# Patient Record
Sex: FEMALE | Race: WHITE | Employment: OTHER
[De-identification: names, ages, dates, MRNs, and addresses within clinical notes are randomized per-mention and may not be internally consistent; named-entity substitution may affect disease eponyms.]

---

## 2017-01-13 DIAGNOSIS — F41.9 ANXIETY: ICD-10-CM

## 2017-01-13 RX ORDER — LORAZEPAM 0.5 MG/1
TABLET ORAL
Qty: 90 TABLET | Refills: 0 | Status: SHIPPED | OUTPATIENT
Start: 2017-01-13 | End: 2017-02-15 | Stop reason: SDUPTHER

## 2017-02-13 DIAGNOSIS — K22.70 BARRETT'S ESOPHAGUS WITHOUT DYSPLASIA: ICD-10-CM

## 2017-02-13 RX ORDER — OMEPRAZOLE 40 MG/1
CAPSULE, DELAYED RELEASE ORAL
Qty: 90 CAPSULE | Refills: 1 | Status: SHIPPED | OUTPATIENT
Start: 2017-02-13 | End: 2017-12-04 | Stop reason: SDUPTHER

## 2017-02-15 ENCOUNTER — OFFICE VISIT (OUTPATIENT)
Facility: CLINIC | Age: 77
End: 2017-02-15

## 2017-02-15 VITALS
WEIGHT: 220.4 LBS | OXYGEN SATURATION: 92 % | BODY MASS INDEX: 35.57 KG/M2 | HEART RATE: 67 BPM | DIASTOLIC BLOOD PRESSURE: 74 MMHG | SYSTOLIC BLOOD PRESSURE: 122 MMHG | TEMPERATURE: 97.7 F

## 2017-02-15 DIAGNOSIS — I10 ESSENTIAL HYPERTENSION: ICD-10-CM

## 2017-02-15 DIAGNOSIS — F41.9 ANXIETY: ICD-10-CM

## 2017-02-15 DIAGNOSIS — J20.9 BRONCHOSPASM WITH BRONCHITIS, ACUTE: ICD-10-CM

## 2017-02-15 DIAGNOSIS — R06.2 WHEEZING: ICD-10-CM

## 2017-02-15 DIAGNOSIS — I48.0 PAROXYSMAL ATRIAL FIBRILLATION (HCC): ICD-10-CM

## 2017-02-15 DIAGNOSIS — J20.9 ACUTE BRONCHITIS, UNSPECIFIED ORGANISM: Primary | ICD-10-CM

## 2017-02-15 PROCEDURE — G8427 DOCREV CUR MEDS BY ELIG CLIN: HCPCS | Performed by: FAMILY MEDICINE

## 2017-02-15 PROCEDURE — 4040F PNEUMOC VAC/ADMIN/RCVD: CPT | Performed by: FAMILY MEDICINE

## 2017-02-15 PROCEDURE — G8417 CALC BMI ABV UP PARAM F/U: HCPCS | Performed by: FAMILY MEDICINE

## 2017-02-15 PROCEDURE — 1036F TOBACCO NON-USER: CPT | Performed by: FAMILY MEDICINE

## 2017-02-15 PROCEDURE — G8484 FLU IMMUNIZE NO ADMIN: HCPCS | Performed by: FAMILY MEDICINE

## 2017-02-15 PROCEDURE — G8399 PT W/DXA RESULTS DOCUMENT: HCPCS | Performed by: FAMILY MEDICINE

## 2017-02-15 PROCEDURE — 99214 OFFICE O/P EST MOD 30 MIN: CPT | Performed by: FAMILY MEDICINE

## 2017-02-15 PROCEDURE — 1090F PRES/ABSN URINE INCON ASSESS: CPT | Performed by: FAMILY MEDICINE

## 2017-02-15 PROCEDURE — 1123F ACP DISCUSS/DSCN MKR DOCD: CPT | Performed by: FAMILY MEDICINE

## 2017-02-15 RX ORDER — AZITHROMYCIN 250 MG/1
250 TABLET, FILM COATED ORAL DAILY
Qty: 10 TABLET | Refills: 0 | Status: SHIPPED | OUTPATIENT
Start: 2017-02-15 | End: 2017-02-25

## 2017-02-15 RX ORDER — ALBUTEROL SULFATE 90 UG/1
2 AEROSOL, METERED RESPIRATORY (INHALATION) EVERY 6 HOURS PRN
Qty: 1 INHALER | Refills: 3 | Status: SHIPPED | OUTPATIENT
Start: 2017-02-15 | End: 2018-02-28 | Stop reason: ALTCHOICE

## 2017-02-15 RX ORDER — LORAZEPAM 0.5 MG/1
TABLET ORAL
Qty: 90 TABLET | Refills: 0 | Status: SHIPPED | OUTPATIENT
Start: 2017-02-15 | End: 2017-03-28 | Stop reason: SDUPTHER

## 2017-03-02 ENCOUNTER — TELEPHONE (OUTPATIENT)
Facility: CLINIC | Age: 77
End: 2017-03-02

## 2017-03-17 ENCOUNTER — OFFICE VISIT (OUTPATIENT)
Dept: PODIATRY | Age: 77
End: 2017-03-17
Payer: MEDICARE

## 2017-03-17 VITALS
HEIGHT: 66 IN | BODY MASS INDEX: 33.59 KG/M2 | WEIGHT: 209 LBS | DIASTOLIC BLOOD PRESSURE: 50 MMHG | SYSTOLIC BLOOD PRESSURE: 90 MMHG

## 2017-03-17 DIAGNOSIS — S93.601A SPRAIN OF FOOT, RIGHT, INITIAL ENCOUNTER: Primary | ICD-10-CM

## 2017-03-17 DIAGNOSIS — M79.671 RIGHT FOOT PAIN: ICD-10-CM

## 2017-03-17 DIAGNOSIS — R60.0 EDEMA OF RIGHT FOOT: ICD-10-CM

## 2017-03-17 PROCEDURE — 1090F PRES/ABSN URINE INCON ASSESS: CPT | Performed by: PODIATRIST

## 2017-03-17 PROCEDURE — G8399 PT W/DXA RESULTS DOCUMENT: HCPCS | Performed by: PODIATRIST

## 2017-03-17 PROCEDURE — G8417 CALC BMI ABV UP PARAM F/U: HCPCS | Performed by: PODIATRIST

## 2017-03-17 PROCEDURE — 4040F PNEUMOC VAC/ADMIN/RCVD: CPT | Performed by: PODIATRIST

## 2017-03-17 PROCEDURE — 99203 OFFICE O/P NEW LOW 30 MIN: CPT | Performed by: PODIATRIST

## 2017-03-17 PROCEDURE — 73630 X-RAY EXAM OF FOOT: CPT | Performed by: PODIATRIST

## 2017-03-17 PROCEDURE — G8427 DOCREV CUR MEDS BY ELIG CLIN: HCPCS | Performed by: PODIATRIST

## 2017-03-17 PROCEDURE — 1036F TOBACCO NON-USER: CPT | Performed by: PODIATRIST

## 2017-03-17 PROCEDURE — G8484 FLU IMMUNIZE NO ADMIN: HCPCS | Performed by: PODIATRIST

## 2017-03-17 PROCEDURE — 1123F ACP DISCUSS/DSCN MKR DOCD: CPT | Performed by: PODIATRIST

## 2017-03-17 ASSESSMENT — ENCOUNTER SYMPTOMS
COLOR CHANGE: 0
SHORTNESS OF BREATH: 0
DIARRHEA: 0
NAUSEA: 0
BACK PAIN: 0

## 2017-03-28 DIAGNOSIS — F41.9 ANXIETY: ICD-10-CM

## 2017-03-28 RX ORDER — LORAZEPAM 0.5 MG/1
TABLET ORAL
Qty: 90 TABLET | Refills: 0 | Status: SHIPPED | OUTPATIENT
Start: 2017-03-28 | End: 2017-05-10 | Stop reason: SDUPTHER

## 2017-05-10 DIAGNOSIS — F41.9 ANXIETY: ICD-10-CM

## 2017-05-10 RX ORDER — LORAZEPAM 0.5 MG/1
TABLET ORAL
Qty: 90 TABLET | Refills: 0 | Status: SHIPPED | OUTPATIENT
Start: 2017-05-10 | End: 2017-06-26 | Stop reason: SDUPTHER

## 2017-06-22 ENCOUNTER — HOSPITAL ENCOUNTER (OUTPATIENT)
Dept: PULMONOLOGY | Age: 77
Setting detail: THERAPIES SERIES
Discharge: HOME | End: 2017-06-22
Payer: MEDICARE

## 2017-06-22 VITALS — HEIGHT: 66 IN | WEIGHT: 215 LBS | BODY MASS INDEX: 34.55 KG/M2

## 2017-06-22 PROCEDURE — G0238 OTH RESP PROC, INDIV: HCPCS

## 2017-06-22 PROCEDURE — G0239 OTH RESP PROC, GROUP: HCPCS

## 2017-06-22 RX ORDER — NITROGLYCERIN 0.4 MG/1
0.4 TABLET SUBLINGUAL EVERY 5 MIN PRN
Status: ON HOLD | COMMUNITY
End: 2019-01-30 | Stop reason: HOSPADM

## 2017-06-26 DIAGNOSIS — F41.9 ANXIETY: ICD-10-CM

## 2017-06-26 RX ORDER — LORAZEPAM 0.5 MG/1
TABLET ORAL
Qty: 90 TABLET | Refills: 0 | Status: SHIPPED | OUTPATIENT
Start: 2017-06-26 | End: 2017-08-07 | Stop reason: SDUPTHER

## 2017-07-17 ENCOUNTER — HOSPITAL ENCOUNTER (OUTPATIENT)
Dept: PULMONOLOGY | Age: 77
Setting detail: THERAPIES SERIES
Discharge: HOME | End: 2017-07-17
Payer: MEDICARE

## 2017-07-17 VITALS — WEIGHT: 216.4 LBS | BODY MASS INDEX: 34.93 KG/M2

## 2017-07-17 PROCEDURE — G0237 THERAPEUTIC PROCD STRG ENDUR: HCPCS

## 2017-07-24 ENCOUNTER — HOSPITAL ENCOUNTER (OUTPATIENT)
Dept: PULMONOLOGY | Age: 77
Setting detail: THERAPIES SERIES
Discharge: HOME | End: 2017-07-24
Payer: MEDICARE

## 2017-07-24 VITALS — WEIGHT: 218.2 LBS | BODY MASS INDEX: 35.22 KG/M2

## 2017-07-24 PROCEDURE — G0239 OTH RESP PROC, GROUP: HCPCS

## 2017-07-31 ENCOUNTER — HOSPITAL ENCOUNTER (OUTPATIENT)
Dept: PULMONOLOGY | Age: 77
Setting detail: THERAPIES SERIES
Discharge: HOME | End: 2017-07-31
Payer: MEDICARE

## 2017-07-31 VITALS — WEIGHT: 219 LBS | BODY MASS INDEX: 35.35 KG/M2

## 2017-07-31 PROCEDURE — G0239 OTH RESP PROC, GROUP: HCPCS

## 2017-08-02 ENCOUNTER — HOSPITAL ENCOUNTER (OUTPATIENT)
Dept: PULMONOLOGY | Age: 77
Setting detail: THERAPIES SERIES
Discharge: HOME | End: 2017-08-02
Payer: MEDICARE

## 2017-08-02 ENCOUNTER — HOSPITAL ENCOUNTER (OUTPATIENT)
Age: 77
Discharge: HOME | End: 2017-08-02
Payer: MEDICARE

## 2017-08-02 VITALS — BODY MASS INDEX: 34.54 KG/M2 | WEIGHT: 214 LBS

## 2017-08-02 LAB
ALT SERPL-CCNC: 48 U/L (ref 5–33)
ANION GAP SERPL CALCULATED.3IONS-SCNC: 15 MMOL/L (ref 9–17)
AST SERPL-CCNC: 37 U/L
BUN BLDV-MCNC: 22 MG/DL (ref 8–23)
BUN/CREAT BLD: ABNORMAL (ref 9–20)
CALCIUM SERPL-MCNC: 9.1 MG/DL (ref 8.6–10.4)
CHLORIDE BLD-SCNC: 106 MMOL/L (ref 98–107)
CHOLESTEROL/HDL RATIO: 3.2
CHOLESTEROL: 158 MG/DL
CO2: 22 MMOL/L (ref 20–31)
CREAT SERPL-MCNC: 0.82 MG/DL (ref 0.5–0.9)
GFR AFRICAN AMERICAN: >60 ML/MIN
GFR NON-AFRICAN AMERICAN: >60 ML/MIN
GFR SERPL CREATININE-BSD FRML MDRD: ABNORMAL ML/MIN/{1.73_M2}
GFR SERPL CREATININE-BSD FRML MDRD: ABNORMAL ML/MIN/{1.73_M2}
GLUCOSE BLD-MCNC: 140 MG/DL (ref 70–99)
HDLC SERPL-MCNC: 49 MG/DL
LDL CHOLESTEROL: 91 MG/DL (ref 0–130)
POTASSIUM SERPL-SCNC: 4.4 MMOL/L (ref 3.7–5.3)
SODIUM BLD-SCNC: 143 MMOL/L (ref 135–144)
TRIGL SERPL-MCNC: 92 MG/DL
VLDLC SERPL CALC-MCNC: NORMAL MG/DL (ref 1–30)

## 2017-08-02 PROCEDURE — 80061 LIPID PANEL: CPT

## 2017-08-02 PROCEDURE — 80048 BASIC METABOLIC PNL TOTAL CA: CPT

## 2017-08-02 PROCEDURE — 84450 TRANSFERASE (AST) (SGOT): CPT

## 2017-08-02 PROCEDURE — 84460 ALANINE AMINO (ALT) (SGPT): CPT

## 2017-08-02 PROCEDURE — 36415 COLL VENOUS BLD VENIPUNCTURE: CPT

## 2017-08-02 PROCEDURE — G0239 OTH RESP PROC, GROUP: HCPCS

## 2017-08-02 RX ORDER — LOSARTAN POTASSIUM 25 MG/1
25 TABLET ORAL DAILY
COMMUNITY
End: 2017-12-20

## 2017-08-07 PROBLEM — J84.10 PULMONARY FIBROSIS (HCC): Status: ACTIVE | Noted: 2017-08-07

## 2017-08-09 ENCOUNTER — HOSPITAL ENCOUNTER (OUTPATIENT)
Dept: PULMONOLOGY | Age: 77
Setting detail: THERAPIES SERIES
Discharge: HOME | End: 2017-08-09
Payer: MEDICARE

## 2017-08-09 PROCEDURE — G0239 OTH RESP PROC, GROUP: HCPCS

## 2017-08-14 ENCOUNTER — HOSPITAL ENCOUNTER (OUTPATIENT)
Dept: PULMONOLOGY | Age: 77
Setting detail: THERAPIES SERIES
Discharge: HOME | End: 2017-08-14
Payer: MEDICARE

## 2017-08-14 VITALS — BODY MASS INDEX: 35.16 KG/M2 | WEIGHT: 218.8 LBS

## 2017-08-14 PROCEDURE — G0239 OTH RESP PROC, GROUP: HCPCS

## 2017-08-21 ENCOUNTER — HOSPITAL ENCOUNTER (OUTPATIENT)
Dept: PULMONOLOGY | Age: 77
Setting detail: THERAPIES SERIES
Discharge: HOME | End: 2017-08-21
Payer: MEDICARE

## 2017-08-21 VITALS — WEIGHT: 218.4 LBS | BODY MASS INDEX: 35.1 KG/M2

## 2017-08-21 PROCEDURE — G0239 OTH RESP PROC, GROUP: HCPCS

## 2017-08-23 ENCOUNTER — HOSPITAL ENCOUNTER (OUTPATIENT)
Dept: PULMONOLOGY | Age: 77
Setting detail: THERAPIES SERIES
Discharge: HOME | End: 2017-08-23
Payer: MEDICARE

## 2017-08-23 PROCEDURE — G0239 OTH RESP PROC, GROUP: HCPCS

## 2017-08-28 ENCOUNTER — HOSPITAL ENCOUNTER (OUTPATIENT)
Dept: PULMONOLOGY | Age: 77
Setting detail: THERAPIES SERIES
Discharge: HOME | End: 2017-08-28
Payer: MEDICARE

## 2017-08-28 VITALS — WEIGHT: 210 LBS | BODY MASS INDEX: 33.75 KG/M2

## 2017-08-28 PROCEDURE — G0239 OTH RESP PROC, GROUP: HCPCS

## 2017-09-06 ENCOUNTER — HOSPITAL ENCOUNTER (OUTPATIENT)
Dept: PULMONOLOGY | Age: 77
Setting detail: THERAPIES SERIES
Discharge: HOME | End: 2017-09-06
Payer: MEDICARE

## 2017-09-06 VITALS — BODY MASS INDEX: 34.59 KG/M2 | WEIGHT: 215.2 LBS

## 2017-09-06 PROCEDURE — G0239 OTH RESP PROC, GROUP: HCPCS

## 2017-09-11 ENCOUNTER — HOSPITAL ENCOUNTER (OUTPATIENT)
Dept: PULMONOLOGY | Age: 77
Setting detail: THERAPIES SERIES
Discharge: HOME | End: 2017-09-11
Payer: MEDICARE

## 2017-09-11 VITALS — BODY MASS INDEX: 34.59 KG/M2 | WEIGHT: 215.2 LBS

## 2017-09-11 PROCEDURE — G0239 OTH RESP PROC, GROUP: HCPCS

## 2017-09-11 PROCEDURE — G0424 PULMONARY REHAB W EXER: HCPCS

## 2017-09-12 ENCOUNTER — HOSPITAL ENCOUNTER (OUTPATIENT)
Age: 77
Discharge: HOME | End: 2017-09-12
Payer: MEDICARE

## 2017-09-12 LAB
ABSOLUTE EOS #: 0.23 K/UL (ref 0–0.4)
ABSOLUTE LYMPH #: 1.04 K/UL (ref 1–4.8)
ABSOLUTE MONO #: 0.93 K/UL (ref 0.1–1.3)
ALBUMIN SERPL-MCNC: 4 G/DL (ref 3.5–5.2)
ALBUMIN/GLOBULIN RATIO: ABNORMAL (ref 1–2.5)
ALP BLD-CCNC: 74 U/L (ref 35–104)
ALT SERPL-CCNC: 25 U/L (ref 5–33)
AST SERPL-CCNC: 23 U/L
BASOPHILS # BLD: 0 %
BASOPHILS ABSOLUTE: 0 K/UL (ref 0–0.2)
BILIRUB SERPL-MCNC: 0.2 MG/DL (ref 0.3–1.2)
BILIRUBIN DIRECT: 0.08 MG/DL
BILIRUBIN, INDIRECT: 0.12 MG/DL (ref 0–1)
DIFFERENTIAL TYPE: ABNORMAL
EOSINOPHILS RELATIVE PERCENT: 2 %
GLOBULIN: ABNORMAL G/DL (ref 1.5–3.8)
HCT VFR BLD CALC: 33 % (ref 36–46)
HEMOGLOBIN: 9.8 G/DL (ref 12–16)
LYMPHOCYTES # BLD: 9 %
MCH RBC QN AUTO: 21.2 PG (ref 26–34)
MCHC RBC AUTO-ENTMCNC: 29.8 G/DL (ref 31–37)
MCV RBC AUTO: 71.2 FL (ref 80–100)
MONOCYTES # BLD: 8 %
MORPHOLOGY: ABNORMAL
PDW BLD-RTO: 20.4 % (ref 11.5–14.9)
PLATELET # BLD: 390 K/UL (ref 150–450)
PLATELET ESTIMATE: ABNORMAL
PMV BLD AUTO: 7.9 FL (ref 6–12)
RBC # BLD: 4.64 M/UL (ref 4–5.2)
RBC # BLD: ABNORMAL 10*6/UL
SEG NEUTROPHILS: 81 %
SEGMENTED NEUTROPHILS ABSOLUTE COUNT: 9.4 K/UL (ref 1.3–9.1)
TOTAL PROTEIN: 7.1 G/DL (ref 6.4–8.3)
WBC # BLD: 11.6 K/UL (ref 3.5–11)
WBC # BLD: ABNORMAL 10*3/UL

## 2017-09-12 PROCEDURE — 36415 COLL VENOUS BLD VENIPUNCTURE: CPT

## 2017-09-12 PROCEDURE — 85025 COMPLETE CBC W/AUTO DIFF WBC: CPT

## 2017-09-12 PROCEDURE — 80076 HEPATIC FUNCTION PANEL: CPT

## 2017-09-13 ENCOUNTER — HOSPITAL ENCOUNTER (OUTPATIENT)
Dept: PULMONOLOGY | Age: 77
Setting detail: THERAPIES SERIES
Discharge: HOME | End: 2017-09-13
Payer: MEDICARE

## 2017-09-13 VITALS — BODY MASS INDEX: 34.39 KG/M2 | WEIGHT: 214 LBS

## 2017-09-13 PROCEDURE — G0239 OTH RESP PROC, GROUP: HCPCS

## 2017-09-15 DIAGNOSIS — F41.9 ANXIETY: ICD-10-CM

## 2017-09-18 ENCOUNTER — HOSPITAL ENCOUNTER (OUTPATIENT)
Dept: PULMONOLOGY | Age: 77
Setting detail: THERAPIES SERIES
Discharge: HOME | End: 2017-09-18
Payer: MEDICARE

## 2017-09-18 VITALS — WEIGHT: 214.2 LBS | BODY MASS INDEX: 34.42 KG/M2

## 2017-09-18 PROCEDURE — G0239 OTH RESP PROC, GROUP: HCPCS

## 2017-09-18 RX ORDER — LORAZEPAM 0.5 MG/1
TABLET ORAL
Qty: 90 TABLET | Refills: 0 | Status: SHIPPED | OUTPATIENT
Start: 2017-09-18 | End: 2017-11-22 | Stop reason: SDUPTHER

## 2017-10-04 ENCOUNTER — HOSPITAL ENCOUNTER (OUTPATIENT)
Dept: PULMONOLOGY | Age: 77
Setting detail: THERAPIES SERIES
Discharge: HOME | End: 2017-10-04
Payer: MEDICARE

## 2017-10-04 VITALS — WEIGHT: 214.4 LBS | BODY MASS INDEX: 34.46 KG/M2

## 2017-10-04 PROCEDURE — G0239 OTH RESP PROC, GROUP: HCPCS

## 2017-10-04 NOTE — PROGRESS NOTES
Pt completed the  Pulmonary rehab program today. Provided pt with discharge handouts. Covered HEP & patients ability to participate in the Phase III program post graduation. Erum plans on attending the Phase III program 1 XQwk.

## 2017-10-09 ENCOUNTER — APPOINTMENT (OUTPATIENT)
Dept: PULMONOLOGY | Age: 77
End: 2017-10-09
Payer: MEDICARE

## 2017-10-11 ENCOUNTER — APPOINTMENT (OUTPATIENT)
Dept: PULMONOLOGY | Age: 77
End: 2017-10-11
Payer: MEDICARE

## 2017-10-16 ENCOUNTER — APPOINTMENT (OUTPATIENT)
Dept: PULMONOLOGY | Age: 77
End: 2017-10-16
Payer: MEDICARE

## 2017-10-18 ENCOUNTER — APPOINTMENT (OUTPATIENT)
Dept: PULMONOLOGY | Age: 77
End: 2017-10-18
Payer: MEDICARE

## 2017-10-23 ENCOUNTER — APPOINTMENT (OUTPATIENT)
Dept: PULMONOLOGY | Age: 77
End: 2017-10-23
Payer: MEDICARE

## 2017-10-25 ENCOUNTER — APPOINTMENT (OUTPATIENT)
Dept: PULMONOLOGY | Age: 77
End: 2017-10-25
Payer: MEDICARE

## 2017-10-30 ENCOUNTER — APPOINTMENT (OUTPATIENT)
Dept: PULMONOLOGY | Age: 77
End: 2017-10-30
Payer: MEDICARE

## 2017-11-22 DIAGNOSIS — F41.9 ANXIETY: ICD-10-CM

## 2017-11-22 RX ORDER — LORAZEPAM 0.5 MG/1
TABLET ORAL
Qty: 90 TABLET | Refills: 0 | Status: SHIPPED | OUTPATIENT
Start: 2017-11-22 | End: 2018-01-08 | Stop reason: SDUPTHER

## 2017-12-04 ENCOUNTER — HOSPITAL ENCOUNTER (OUTPATIENT)
Dept: WOMENS IMAGING | Age: 77
Discharge: HOME | End: 2017-12-04
Payer: MEDICARE

## 2017-12-04 DIAGNOSIS — K22.70 BARRETT'S ESOPHAGUS WITHOUT DYSPLASIA: ICD-10-CM

## 2017-12-04 DIAGNOSIS — Z12.31 SCREENING MAMMOGRAM, ENCOUNTER FOR: ICD-10-CM

## 2017-12-04 PROCEDURE — 77063 BREAST TOMOSYNTHESIS BI: CPT

## 2017-12-04 RX ORDER — OMEPRAZOLE 40 MG/1
CAPSULE, DELAYED RELEASE ORAL
Qty: 90 CAPSULE | Refills: 1 | Status: SHIPPED | OUTPATIENT
Start: 2017-12-04 | End: 2018-04-28 | Stop reason: SDUPTHER

## 2017-12-19 ENCOUNTER — HOSPITAL ENCOUNTER (OUTPATIENT)
Age: 77
Discharge: HOME | End: 2017-12-19
Payer: MEDICARE

## 2017-12-19 LAB
ANION GAP SERPL CALCULATED.3IONS-SCNC: 13 MMOL/L (ref 9–17)
BUN BLDV-MCNC: 22 MG/DL (ref 8–23)
CHLORIDE BLD-SCNC: 103 MMOL/L (ref 98–107)
CO2: 23 MMOL/L (ref 20–31)
CREAT SERPL-MCNC: 0.84 MG/DL (ref 0.5–0.9)
GFR AFRICAN AMERICAN: >60 ML/MIN
GFR NON-AFRICAN AMERICAN: >60 ML/MIN
GFR SERPL CREATININE-BSD FRML MDRD: NORMAL ML/MIN/{1.73_M2}
GFR SERPL CREATININE-BSD FRML MDRD: NORMAL ML/MIN/{1.73_M2}
POTASSIUM SERPL-SCNC: 4.4 MMOL/L (ref 3.7–5.3)
SODIUM BLD-SCNC: 139 MMOL/L (ref 135–144)

## 2017-12-19 PROCEDURE — 84520 ASSAY OF UREA NITROGEN: CPT

## 2017-12-19 PROCEDURE — 82565 ASSAY OF CREATININE: CPT

## 2017-12-19 PROCEDURE — 80051 ELECTROLYTE PANEL: CPT

## 2017-12-19 PROCEDURE — 36415 COLL VENOUS BLD VENIPUNCTURE: CPT

## 2017-12-20 ENCOUNTER — HOSPITAL ENCOUNTER (INPATIENT)
Age: 77
LOS: 3 days | Discharge: HOME | DRG: 193 | End: 2017-12-23
Attending: EMERGENCY MEDICINE | Admitting: FAMILY MEDICINE
Payer: MEDICARE

## 2017-12-20 ENCOUNTER — APPOINTMENT (OUTPATIENT)
Dept: GENERAL RADIOLOGY | Age: 77
DRG: 193 | End: 2017-12-20
Payer: MEDICARE

## 2017-12-20 DIAGNOSIS — J18.9 PNEUMONIA DUE TO ORGANISM: Primary | ICD-10-CM

## 2017-12-20 DIAGNOSIS — N39.0 URINARY TRACT INFECTION WITHOUT HEMATURIA, SITE UNSPECIFIED: ICD-10-CM

## 2017-12-20 DIAGNOSIS — R79.89 ELEVATED BRAIN NATRIURETIC PEPTIDE (BNP) LEVEL: ICD-10-CM

## 2017-12-20 PROBLEM — E78.2 MIXED HYPERLIPIDEMIA: Status: ACTIVE | Noted: 2017-12-20

## 2017-12-20 LAB
-: ABNORMAL
ABSOLUTE EOS #: 0.1 K/UL (ref 0–0.4)
ABSOLUTE IMMATURE GRANULOCYTE: ABNORMAL K/UL (ref 0–0.3)
ABSOLUTE LYMPH #: 0.86 K/UL (ref 1–4.8)
ABSOLUTE MONO #: 0.58 K/UL (ref 0.1–1.3)
AMORPHOUS: ABNORMAL
ANION GAP SERPL CALCULATED.3IONS-SCNC: 13 MMOL/L (ref 9–17)
BACTERIA: ABNORMAL
BASOPHILS # BLD: 0 % (ref 0–2)
BASOPHILS ABSOLUTE: 0 K/UL (ref 0–0.2)
BILIRUBIN URINE: NEGATIVE
BNP INTERPRETATION: ABNORMAL
BUN BLDV-MCNC: 22 MG/DL (ref 8–23)
BUN/CREAT BLD: NORMAL (ref 9–20)
CALCIUM SERPL-MCNC: 9 MG/DL (ref 8.6–10.4)
CASTS UA: ABNORMAL /LPF
CHLORIDE BLD-SCNC: 104 MMOL/L (ref 98–107)
CO2: 21 MMOL/L (ref 20–31)
COLOR: YELLOW
COMMENT UA: ABNORMAL
CREAT SERPL-MCNC: 0.84 MG/DL (ref 0.5–0.9)
CRYSTALS, UA: ABNORMAL /HPF
DIFFERENTIAL TYPE: ABNORMAL
EKG ATRIAL RATE: 56 BPM
EKG P AXIS: 74 DEGREES
EKG P-R INTERVAL: 254 MS
EKG Q-T INTERVAL: 436 MS
EKG QRS DURATION: 94 MS
EKG QTC CALCULATION (BAZETT): 420 MS
EKG R AXIS: 32 DEGREES
EKG T AXIS: -26 DEGREES
EKG VENTRICULAR RATE: 56 BPM
EOSINOPHILS RELATIVE PERCENT: 1 % (ref 0–4)
EPITHELIAL CELLS UA: ABNORMAL /HPF
GFR AFRICAN AMERICAN: >60 ML/MIN
GFR NON-AFRICAN AMERICAN: >60 ML/MIN
GFR SERPL CREATININE-BSD FRML MDRD: NORMAL ML/MIN/{1.73_M2}
GFR SERPL CREATININE-BSD FRML MDRD: NORMAL ML/MIN/{1.73_M2}
GLUCOSE BLD-MCNC: 99 MG/DL (ref 70–99)
GLUCOSE URINE: NEGATIVE
HCT VFR BLD CALC: 30.2 % (ref 36–46)
HEMOGLOBIN: 9 G/DL (ref 12–16)
IMMATURE GRANULOCYTES: ABNORMAL %
KETONES, URINE: NEGATIVE
LEUKOCYTE ESTERASE, URINE: NEGATIVE
LYMPHOCYTES # BLD: 9 % (ref 24–44)
MCH RBC QN AUTO: 19.6 PG (ref 26–34)
MCHC RBC AUTO-ENTMCNC: 29.8 G/DL (ref 31–37)
MCV RBC AUTO: 65.8 FL (ref 80–100)
MONOCYTES # BLD: 6 % (ref 1–7)
MORPHOLOGY: ABNORMAL
MUCUS: ABNORMAL
NITRITE, URINE: POSITIVE
OTHER OBSERVATIONS UA: ABNORMAL
PDW BLD-RTO: 20.9 % (ref 11.5–14.9)
PH UA: 5.5 (ref 5–8)
PLATELET # BLD: 351 K/UL (ref 150–450)
PLATELET ESTIMATE: ABNORMAL
PMV BLD AUTO: 8.7 FL (ref 6–12)
POTASSIUM SERPL-SCNC: 4.4 MMOL/L (ref 3.7–5.3)
PRO-BNP: 2935 PG/ML
PROTEIN UA: ABNORMAL
RBC # BLD: 4.59 M/UL (ref 4–5.2)
RBC # BLD: ABNORMAL 10*6/UL
RBC UA: ABNORMAL /HPF
RENAL EPITHELIAL, UA: ABNORMAL /HPF
SEG NEUTROPHILS: 84 % (ref 36–66)
SEGMENTED NEUTROPHILS ABSOLUTE COUNT: 8.06 K/UL (ref 1.3–9.1)
SODIUM BLD-SCNC: 138 MMOL/L (ref 135–144)
SPECIFIC GRAVITY UA: 1.02 (ref 1–1.03)
TRICHOMONAS: ABNORMAL
TROPONIN INTERP: NORMAL
TROPONIN INTERP: NORMAL
TROPONIN T: <0.03 NG/ML
TROPONIN T: <0.03 NG/ML
TURBIDITY: CLEAR
URINE HGB: NEGATIVE
UROBILINOGEN, URINE: NORMAL
WBC # BLD: 9.6 K/UL (ref 3.5–11)
WBC # BLD: ABNORMAL 10*3/UL
WBC UA: ABNORMAL /HPF
YEAST: ABNORMAL

## 2017-12-20 PROCEDURE — 6370000000 HC RX 637 (ALT 250 FOR IP): Performed by: FAMILY MEDICINE

## 2017-12-20 PROCEDURE — 93005 ELECTROCARDIOGRAM TRACING: CPT

## 2017-12-20 PROCEDURE — 71020 XR CHEST STANDARD TWO VW: CPT

## 2017-12-20 PROCEDURE — 2580000003 HC RX 258: Performed by: EMERGENCY MEDICINE

## 2017-12-20 PROCEDURE — 6370000000 HC RX 637 (ALT 250 FOR IP): Performed by: EMERGENCY MEDICINE

## 2017-12-20 PROCEDURE — 85025 COMPLETE CBC W/AUTO DIFF WBC: CPT

## 2017-12-20 PROCEDURE — 6360000002 HC RX W HCPCS: Performed by: EMERGENCY MEDICINE

## 2017-12-20 PROCEDURE — 84484 ASSAY OF TROPONIN QUANT: CPT

## 2017-12-20 PROCEDURE — 2060000000 HC ICU INTERMEDIATE R&B

## 2017-12-20 PROCEDURE — 83880 ASSAY OF NATRIURETIC PEPTIDE: CPT

## 2017-12-20 PROCEDURE — 81001 URINALYSIS AUTO W/SCOPE: CPT

## 2017-12-20 PROCEDURE — 99285 EMERGENCY DEPT VISIT HI MDM: CPT

## 2017-12-20 PROCEDURE — 96374 THER/PROPH/DIAG INJ IV PUSH: CPT

## 2017-12-20 PROCEDURE — 36415 COLL VENOUS BLD VENIPUNCTURE: CPT

## 2017-12-20 PROCEDURE — 80048 BASIC METABOLIC PNL TOTAL CA: CPT

## 2017-12-20 RX ORDER — FUROSEMIDE 20 MG/1
40 TABLET ORAL DAILY
Status: ON HOLD | COMMUNITY
End: 2019-01-30 | Stop reason: HOSPADM

## 2017-12-20 RX ORDER — PANTOPRAZOLE SODIUM 40 MG/1
40 TABLET, DELAYED RELEASE ORAL
Status: DISCONTINUED | OUTPATIENT
Start: 2017-12-21 | End: 2017-12-23 | Stop reason: HOSPADM

## 2017-12-20 RX ORDER — M-VIT,TX,IRON,MINS/CALC/FOLIC 27MG-0.4MG
1 TABLET ORAL DAILY
Status: DISCONTINUED | OUTPATIENT
Start: 2017-12-21 | End: 2017-12-23 | Stop reason: HOSPADM

## 2017-12-20 RX ORDER — NITROGLYCERIN 0.4 MG/1
0.4 TABLET SUBLINGUAL EVERY 5 MIN PRN
Status: DISCONTINUED | OUTPATIENT
Start: 2017-12-20 | End: 2017-12-22 | Stop reason: SDUPTHER

## 2017-12-20 RX ORDER — ACETAMINOPHEN 325 MG/1
650 TABLET ORAL EVERY 4 HOURS PRN
Status: DISCONTINUED | OUTPATIENT
Start: 2017-12-20 | End: 2017-12-23 | Stop reason: HOSPADM

## 2017-12-20 RX ORDER — SODIUM CHLORIDE 0.9 % (FLUSH) 0.9 %
10 SYRINGE (ML) INJECTION EVERY 12 HOURS SCHEDULED
Status: DISCONTINUED | OUTPATIENT
Start: 2017-12-20 | End: 2017-12-23 | Stop reason: HOSPADM

## 2017-12-20 RX ORDER — METOPROLOL TARTRATE 50 MG/1
50 TABLET, FILM COATED ORAL ONCE
Status: COMPLETED | OUTPATIENT
Start: 2017-12-20 | End: 2017-12-20

## 2017-12-20 RX ORDER — CITALOPRAM 20 MG/1
20 TABLET ORAL DAILY
Status: DISCONTINUED | OUTPATIENT
Start: 2017-12-21 | End: 2017-12-23 | Stop reason: HOSPADM

## 2017-12-20 RX ORDER — ATORVASTATIN CALCIUM 40 MG/1
40 TABLET, FILM COATED ORAL DAILY
Status: DISCONTINUED | OUTPATIENT
Start: 2017-12-21 | End: 2017-12-20

## 2017-12-20 RX ORDER — LORAZEPAM 0.5 MG/1
0.5 TABLET ORAL 3 TIMES DAILY PRN
Status: DISCONTINUED | OUTPATIENT
Start: 2017-12-20 | End: 2017-12-23 | Stop reason: HOSPADM

## 2017-12-20 RX ORDER — SODIUM CHLORIDE 0.9 % (FLUSH) 0.9 %
10 SYRINGE (ML) INJECTION PRN
Status: DISCONTINUED | OUTPATIENT
Start: 2017-12-20 | End: 2017-12-23 | Stop reason: HOSPADM

## 2017-12-20 RX ORDER — METOPROLOL SUCCINATE 100 MG/1
100 TABLET, EXTENDED RELEASE ORAL DAILY
Status: DISCONTINUED | OUTPATIENT
Start: 2017-12-21 | End: 2017-12-23 | Stop reason: HOSPADM

## 2017-12-20 RX ORDER — ASPIRIN 81 MG/1
81 TABLET ORAL DAILY
Status: DISCONTINUED | OUTPATIENT
Start: 2017-12-21 | End: 2017-12-20

## 2017-12-20 RX ORDER — ATORVASTATIN CALCIUM 40 MG/1
40 TABLET, FILM COATED ORAL DAILY
Status: DISCONTINUED | OUTPATIENT
Start: 2017-12-21 | End: 2017-12-23 | Stop reason: HOSPADM

## 2017-12-20 RX ORDER — FUROSEMIDE 20 MG/1
10 TABLET ORAL DAILY PRN
Status: DISCONTINUED | OUTPATIENT
Start: 2017-12-20 | End: 2017-12-23 | Stop reason: HOSPADM

## 2017-12-20 RX ORDER — HYDRALAZINE HYDROCHLORIDE 20 MG/ML
10 INJECTION INTRAMUSCULAR; INTRAVENOUS EVERY 4 HOURS PRN
Status: DISCONTINUED | OUTPATIENT
Start: 2017-12-20 | End: 2017-12-23 | Stop reason: HOSPADM

## 2017-12-20 RX ORDER — ASPIRIN 81 MG/1
81 TABLET ORAL DAILY
Status: DISCONTINUED | OUTPATIENT
Start: 2017-12-21 | End: 2017-12-23 | Stop reason: HOSPADM

## 2017-12-20 RX ORDER — NITROGLYCERIN 0.4 MG/1
0.4 TABLET SUBLINGUAL EVERY 5 MIN PRN
Status: DISCONTINUED | OUTPATIENT
Start: 2017-12-20 | End: 2017-12-23 | Stop reason: HOSPADM

## 2017-12-20 RX ADMIN — AZITHROMYCIN MONOHYDRATE 500 MG: 500 INJECTION, POWDER, LYOPHILIZED, FOR SOLUTION INTRAVENOUS at 19:22

## 2017-12-20 RX ADMIN — METOPROLOL TARTRATE 50 MG: 50 TABLET ORAL at 19:52

## 2017-12-20 RX ADMIN — APIXABAN 5 MG: 5 TABLET, FILM COATED ORAL at 23:33

## 2017-12-20 RX ADMIN — CEFTRIAXONE SODIUM 1 G: 1 INJECTION, POWDER, FOR SOLUTION INTRAMUSCULAR; INTRAVENOUS at 19:15

## 2017-12-20 RX ADMIN — LORAZEPAM 0.5 MG: 0.5 TABLET ORAL at 23:33

## 2017-12-20 ASSESSMENT — ENCOUNTER SYMPTOMS
VOMITING: 0
EYE PAIN: 0
NAUSEA: 0
RHINORRHEA: 0
COUGH: 0
ABDOMINAL PAIN: 0
COLOR CHANGE: 0
SORE THROAT: 0
SHORTNESS OF BREATH: 1

## 2017-12-20 NOTE — ED PROVIDER NOTES
Cardioversion. Social History     Social History    Marital status:      Spouse name: N/A    Number of children: N/A    Years of education: N/A     Occupational History    Not on file. Social History Main Topics    Smoking status: Former Smoker     Years: 25.00    Smokeless tobacco: Not on file    Alcohol use Yes      Comment: occ    Drug use: No    Sexual activity: No     Other Topics Concern    Not on file     Social History Narrative    No narrative on file       Family History   Problem Relation Age of Onset    Heart Disease Father      congestive heart failure    Liver Disease Mother      liver failure    Heart Disease Brother      MI all 4 brothers   [place] Cancer Sister      colon, lung    Other Sister      lupus, kidney failure    High Blood Pressure Sister     High Cholesterol Sister     Stroke Sister     Heart Disease Sister      MI for both sisters    Cancer Sister      lung cancer both sisters    Stroke Maternal Grandmother        Allergies:  Penicillins    Home Medications:  Prior to Admission medications    Medication Sig Start Date End Date Taking? Authorizing Provider   omeprazole (PRILOSEC) 40 MG delayed release capsule TAKE 1 CAPSULE EVERY DAY 12/4/17  Yes Ana Maria Mota MD   LORazepam (ATIVAN) 0.5 MG tablet TAKE ONE TABLET BY MOUTH THREE TIMES DAILY AS NEEDED FOR ANXIETY 11/22/17  Yes Ana Maria Mota MD   citalopram (CELEXA) 20 MG tablet Take 1 tablet by mouth daily 8/7/17  Yes Ana Maria Mota MD   nitroGLYCERIN (NITROSTAT) 0.4 MG SL tablet Place 0.4 mg under the tongue every 5 minutes as needed for Chest pain up to max of 3 total doses. If no relief after 1 dose, call 911.    Yes Historical Provider, MD   aspirin 81 MG tablet Take 81 mg by mouth daily   Yes Historical Provider, MD   albuterol sulfate HFA (VENTOLIN HFA) 108 (90 BASE) MCG/ACT inhaler Inhale 2 puffs into the lungs every 6 hours as needed for Wheezing 2/15/17  Yes Ana Maria Mota MD   apixaban (ELIQUIS) 5 MG TABS tablet Take by mouth 2 times daily   Yes Historical Provider, MD   Multiple Vitamins-Minerals (THERAPEUTIC MULTIVITAMIN-MINERALS) tablet Take 1 tablet by mouth daily   Yes Historical Provider, MD   Coenzyme Q10 (CO Q-10) 200 MG CAPS Take by mouth Daily   Yes Historical Provider, MD   metoprolol (TOPROL-XL) 100 MG XL tablet Take 100 mg by mouth daily   Yes Historical Provider, MD   nitroGLYCERIN (NITROLINGUAL) 0.4 MG/SPRAY spray Place 1 spray under the tongue every 5 minutes as needed. Yes Historical Provider, MD   atorvastatin (LIPITOR) 40 MG tablet Take 40 mg by mouth daily. Yes Historical Provider, MD       REVIEW OF SYSTEMS    (2-9 systems for level 4, 10 or more for level 5)      Review of Systems   Constitutional: Negative for chills and fever. HENT: Negative for rhinorrhea and sore throat. Eyes: Negative for pain and visual disturbance. Respiratory: Positive for shortness of breath. Negative for cough. Cardiovascular: Negative for chest pain and palpitations. Gastrointestinal: Negative for abdominal pain, nausea and vomiting. Genitourinary: Negative for difficulty urinating and dysuria. Musculoskeletal: Negative for arthralgias and myalgias. Skin: Negative for color change and wound. Neurological: Positive for light-headedness. Negative for weakness, numbness and headaches. Psychiatric/Behavioral: Negative for behavioral problems and dysphoric mood. PHYSICAL EXAM   (up to 7 for level 4, 8 or more for level 5)      INITIAL VITALS:   BP (!) 205/74   Pulse 67   Temp 98.2 °F (36.8 °C) (Oral)   Resp 19   Ht 5' 6\" (1.676 m)   Wt 200 lb (90.7 kg)   LMP 11/01/1985   SpO2 91%   BMI 32.28 kg/m²     Physical Exam   Constitutional: She is oriented to person, place, and time. She appears well-developed and well-nourished. No distress. HENT:   Head: Normocephalic and atraumatic.    Mouth/Throat: Oropharynx is clear and moist.   Eyes: EOM are normal. Pupils are

## 2017-12-20 NOTE — ED PROVIDER NOTES
[address] ED  eMERGENCY dEPARTMENT eNCOUnter   Attending Attestation     Pt Name: Esa Osborn  MRN: [id number]  [place] 1940  Date of evaluation: 12/20/17       Esa Osborn is a 76 y.o. female who presents with Shortness of Breath and Hypotension      MDM:     This is a 70-year-old female that presents with complaints of dizziness and feeling like she is going to pass out. Patient states this been ongoing for a few weeks, it was worse today and she was post to see her family physician but she was feeling ill and could not make it to her doctor's appointment. The patient states this is associated mainly with getting out of the shower, it is not usually when going from a seated to a standing position. The patient denies any chest pain or palpitations, she has no nausea or vomiting. She complains of a mild cough but no fevers or chills. On examination the patient's awake and alert, oriented ×3, her heart is bradycardic with regular rhythm. Patient's chest x-ray shows possible pneumonia, we will start antibody therapy, I did recommend to the patient that she should be admitted to the hospital for further evaluation of her near-syncope. I discussed the risks and benefits including death. Patient declined admission because [facility] is coming up and she does not wish to stay in the hospital.  I had a long discussion with her about returning to the hospital.  Patient and her family member understood. Vitals:   Vitals:    12/20/17 1515 12/20/17 1545 12/20/17 1600 12/20/17 1615   BP: (!) 158/97 (!) 156/73 (!) 146/65    Pulse:  66 55 55   Resp:   18 23   Temp:       TempSrc:       SpO2:  96% 94% 94%   Weight:       Height:             I personally evaluated and examined the patient in conjunction with the resident and agree with the assessment, treatment plan, and disposition of the patient as recorded by the resident.     I performed a history and physical examination of the patient and discussed

## 2017-12-21 PROBLEM — N30.00 ACUTE CYSTITIS WITHOUT HEMATURIA: Status: ACTIVE | Noted: 2017-12-21

## 2017-12-21 LAB
ALBUMIN SERPL-MCNC: 3.4 G/DL (ref 3.5–5.2)
ALBUMIN/GLOBULIN RATIO: ABNORMAL (ref 1–2.5)
ALP BLD-CCNC: 65 U/L (ref 35–104)
ALT SERPL-CCNC: 25 U/L (ref 5–33)
ANION GAP SERPL CALCULATED.3IONS-SCNC: 13 MMOL/L (ref 9–17)
AST SERPL-CCNC: 26 U/L
BILIRUB SERPL-MCNC: 0.28 MG/DL (ref 0.3–1.2)
BUN BLDV-MCNC: 18 MG/DL (ref 8–23)
BUN/CREAT BLD: ABNORMAL (ref 9–20)
CALCIUM SERPL-MCNC: 8.8 MG/DL (ref 8.6–10.4)
CHLORIDE BLD-SCNC: 104 MMOL/L (ref 98–107)
CO2: 22 MMOL/L (ref 20–31)
CREAT SERPL-MCNC: 0.7 MG/DL (ref 0.5–0.9)
GFR AFRICAN AMERICAN: >60 ML/MIN
GFR NON-AFRICAN AMERICAN: >60 ML/MIN
GFR SERPL CREATININE-BSD FRML MDRD: ABNORMAL ML/MIN/{1.73_M2}
GFR SERPL CREATININE-BSD FRML MDRD: ABNORMAL ML/MIN/{1.73_M2}
GLUCOSE BLD-MCNC: 85 MG/DL (ref 70–99)
HCT VFR BLD CALC: 28.8 % (ref 36–46)
HEMOGLOBIN: 8.6 G/DL (ref 12–16)
MCH RBC QN AUTO: 19.6 PG (ref 26–34)
MCHC RBC AUTO-ENTMCNC: 29.8 G/DL (ref 31–37)
MCV RBC AUTO: 65.8 FL (ref 80–100)
PDW BLD-RTO: 20.9 % (ref 11.5–14.9)
PLATELET # BLD: 336 K/UL (ref 150–450)
PMV BLD AUTO: 9.1 FL (ref 6–12)
POTASSIUM SERPL-SCNC: 4.2 MMOL/L (ref 3.7–5.3)
RBC # BLD: 4.37 M/UL (ref 4–5.2)
SODIUM BLD-SCNC: 139 MMOL/L (ref 135–144)
TOTAL PROTEIN: 6 G/DL (ref 6.4–8.3)
WBC # BLD: 8.8 K/UL (ref 3.5–11)

## 2017-12-21 PROCEDURE — 6360000002 HC RX W HCPCS: Performed by: FAMILY MEDICINE

## 2017-12-21 PROCEDURE — 6370000000 HC RX 637 (ALT 250 FOR IP): Performed by: FAMILY MEDICINE

## 2017-12-21 PROCEDURE — 2580000003 HC RX 258: Performed by: FAMILY MEDICINE

## 2017-12-21 PROCEDURE — 80053 COMPREHEN METABOLIC PANEL: CPT

## 2017-12-21 PROCEDURE — 36415 COLL VENOUS BLD VENIPUNCTURE: CPT

## 2017-12-21 PROCEDURE — 85027 COMPLETE CBC AUTOMATED: CPT

## 2017-12-21 PROCEDURE — 99223 1ST HOSP IP/OBS HIGH 75: CPT | Performed by: FAMILY MEDICINE

## 2017-12-21 PROCEDURE — 2060000000 HC ICU INTERMEDIATE R&B

## 2017-12-21 RX ORDER — SODIUM CHLORIDE 9 MG/ML
INJECTION, SOLUTION INTRAVENOUS CONTINUOUS
Status: DISCONTINUED | OUTPATIENT
Start: 2017-12-21 | End: 2017-12-22

## 2017-12-21 RX ORDER — ONDANSETRON 2 MG/ML
4 INJECTION INTRAMUSCULAR; INTRAVENOUS EVERY 6 HOURS PRN
Status: DISCONTINUED | OUTPATIENT
Start: 2017-12-21 | End: 2017-12-23 | Stop reason: HOSPADM

## 2017-12-21 RX ADMIN — ATORVASTATIN CALCIUM 40 MG: 40 TABLET, FILM COATED ORAL at 00:47

## 2017-12-21 RX ADMIN — METOPROLOL SUCCINATE 100 MG: 100 TABLET, EXTENDED RELEASE ORAL at 09:15

## 2017-12-21 RX ADMIN — CEFTRIAXONE SODIUM 1 G: 1 INJECTION, POWDER, FOR SOLUTION INTRAMUSCULAR; INTRAVENOUS at 18:41

## 2017-12-21 RX ADMIN — SODIUM CHLORIDE: 9 INJECTION, SOLUTION INTRAVENOUS at 09:16

## 2017-12-21 RX ADMIN — ASPIRIN 81 MG: 81 TABLET, COATED ORAL at 00:47

## 2017-12-21 RX ADMIN — MULTIPLE VITAMINS W/ MINERALS TAB 1 TABLET: TAB at 09:15

## 2017-12-21 RX ADMIN — CITALOPRAM HYDROBROMIDE 20 MG: 20 TABLET ORAL at 09:15

## 2017-12-21 RX ADMIN — APIXABAN 5 MG: 5 TABLET, FILM COATED ORAL at 09:14

## 2017-12-21 RX ADMIN — AZITHROMYCIN MONOHYDRATE 500 MG: 500 INJECTION, POWDER, LYOPHILIZED, FOR SOLUTION INTRAVENOUS at 17:24

## 2017-12-21 RX ADMIN — APIXABAN 5 MG: 5 TABLET, FILM COATED ORAL at 20:58

## 2017-12-21 RX ADMIN — LORAZEPAM 0.5 MG: 0.5 TABLET ORAL at 23:05

## 2017-12-21 RX ADMIN — PANTOPRAZOLE SODIUM 40 MG: 40 TABLET, DELAYED RELEASE ORAL at 07:26

## 2017-12-21 ASSESSMENT — PAIN SCALES - GENERAL
PAINLEVEL_OUTOF10: 0

## 2017-12-21 NOTE — FLOWSHEET NOTE
12/20/17 2059   Provider Notification   Reason for Communication Review case   Provider Name Dr. Holger Oakes   Provider Notification Physician   Method of Communication Call   Response See orders   Notification Time 2059   Dr. Holger Oakes notified of admission. Dr. Holger Oakes stated to order cardiac consult and cardiac diet, and that she will review chart and do other admission orders tonight. See orders.

## 2017-12-21 NOTE — CONSULTS
sisters    Stroke Maternal Grandmother            Review of Systems:   · Constitutional: there has been no unanticipated weight loss. There's been no change in energy level, sleep pattern, or activity level. · Eyes: No visual changes or diplopia. No scleral icterus. · ENT: No Headaches, hearing loss or vertigo. No mouth sores or sore throat. · Cardiovascular: No chest pain, dyspnea on exertion, palpitations or loss of consciousness. No cough, hemoptysis, pleuritic pain, or phlebitis. · Respiratory: No cough or wheezing, no sputum production. No hematemesis. · Gastrointestinal: No abdominal pain, appetite loss, blood in stools. No change in bowel or bladder habits. · Genitourinary: No dysuria, trouble voiding, or hematuria. · Musculoskeletal:  No gait disturbance, weakness or joint complaints. · Integumentary: No rash or pruritis. · Neurological: No headache, diplopia, change in muscle strength, numbness or tingling. No change in gait, balance, coordination, mood, affect, memory, mentation, behavior. · Psychiatric: No anxiety, or depression. · Endocrine: No temperature intolerance. No excessive thirst, fluid intake, or urination. No tremor. · Hematologic/Lymphatic: No abnormal bruising or bleeding, blood clots or swollen lymph nodes. · Allergic/Immunologic: No nasal congestion or hives.        Physical Exam    Vital Signs: BP (!) 150/69   Pulse 58   Temp 98.5 °F (36.9 °C) (Oral)   Resp 16   Ht 5' 6\" (1.676 m)   Wt 198 lb 3.1 oz (89.9 kg)   LMP 11/01/1985   SpO2 96%   BMI 31.99 kg/m²  O2 Flow Rate (L/min): 2 L/min     Admission Weight: 200 lb (90.7 kg)     General appearance: Awake, Alert Cooperative    Head: Normocephalic, without obvious abnormality, atraumatic    Eyes: normal conjunctivae/sclerae    Neck: no carotid bruit, no JVD, supple, symmetrical, trachea midline     Lungs: faint rales, unlabored    Heart: regular rate and rhythm, S1, S2 normal, no murmur, click, rub or gallop    Abdomen: Soft, non-tender. Bowel sounds normal. No masses,  no organomegaly    Extremities: extremities normal, atraumatic, no cyanosis or edema    Skin: Skin color, texture, turgor normal. No rashes or lesions    Neurologic: Grossly normal       Labs:      CBC:   Recent Labs      12/20/17   1620  12/21/17   0457   WBC  9.6  8.8   HGB  9.0*  8.6*   HCT  30.2*  28.8*   MCV  65.8*  65.8*   PLT  351  336     BMP:   Recent Labs      12/19/17   1016  12/20/17   1620  12/21/17   0457   NA  139  138  139   K  4.4  4.4  4.2   CL  103  104  104   CO2  23  21  22   BUN  22  22  18   CREATININE  0.84  0.84  0.70     Troponin T   Recent Labs      12/20/17   1620  12/20/17   1800   TROPONINT  <0.03  <0.03     Pro-BNP 2935    EKG:      CXR:  Sternotomy wires are noted.  There are bibasilar interstitial infiltrates in   the right upper lobe interstitial infiltrate. Nidia Watters is a moderately large   hiatal hernia.  There is mild to moderate cardiomegaly.  There is calcified   plaque in the aortic arch.  Mediastinal clips are noted.  The bony thorax is   intact. Echo 11/14  Normal left ventricle size and function with an estimated EF 55-60%. Mild left ventricular hypertrophy. Evidence of diastolic dysfunction. Bi-atrial enlargement. Mild mitral regurgitation. Mild tricuspid regurgitation. Estimated right ventricular systolic pressure is 46 mmHg. Mild to moderately elevated right ventricular systolic pressure. No significant pericardial effusion is seen. Diagnosis:  Principal Problem:    Pneumonia  Active Problems:    GERD (gastroesophageal reflux disease)    Anxiety    Hypertension    Atrial fibrillation (HCC)    Pulmonary fibrosis (HCC)    Mixed hyperlipidemia    Acute cystitis without hematuria    1. Hypertension  -BP has been quite labile since admission ranging from 110s-200s. Per pt, has been running 80s-90s prior to admission.  Recent medication changes as outpatient including discontinuation of losartan and decreased dose of metoprolol.   -resume losartan 25mg QD. Continue Toprol and PRN Hydralazine    2. PNA  -per primary    3. ASCVD, Hx CABG and redo-CABG 2010  -no active angina. Continue medical therapy    4. Idiopathic pulmonary fibrosis  -pt follows with Dr. Camden Hay as outpatient    5. PAF  -remains NSR. On Eliquis    6. CDCHF  -pt appears euvolemic. Continue PO lasix. Will d/w Attending and follow along.         Electronically signed by PARADISE Mata

## 2017-12-21 NOTE — PROGRESS NOTES
MG tablet Take 81 mg by mouth daily   Yes Historical Provider, MD   albuterol sulfate HFA (VENTOLIN HFA) 108 (90 BASE) MCG/ACT inhaler Inhale 2 puffs into the lungs every 6 hours as needed for Wheezing 2/15/17  Yes Joey Sullivan MD   apixaban (ELIQUIS) 5 MG TABS tablet Take by mouth 2 times daily   Yes Historical Provider, MD   Multiple Vitamins-Minerals (THERAPEUTIC MULTIVITAMIN-MINERALS) tablet Take 1 tablet by mouth daily   Yes Historical Provider, MD   Coenzyme Q10 (CO Q-10) 200 MG CAPS Take by mouth Daily   Yes Historical Provider, MD   metoprolol (TOPROL-XL) 100 MG XL tablet Take 100 mg by mouth daily    Yes Historical Provider, MD   atorvastatin (LIPITOR) 40 MG tablet Take 40 mg by mouth daily. Yes Historical Provider, MD   nitroGLYCERIN (NITROSTAT) 0.4 MG SL tablet Place 0.4 mg under the tongue every 5 minutes as needed for Chest pain up to max of 3 total doses. If no relief after 1 dose, call 911. Historical Provider, MD   nitroGLYCERIN (NITROLINGUAL) 0.4 MG/SPRAY spray Place 1 spray under the tongue every 5 minutes as needed. Historical Provider, MD       Allergies:  Penicillins    Social History:  The patient currently lives at home    TOBACCO:   reports that she has quit smoking. She quit after 25.00 years of use. She does not have any smokeless tobacco history on file. ETOH:   reports that she drinks alcohol.     Review of Systems - General ROS: positive for  - fatigue  Psychological ROS: positive for - anxiety  Ophthalmic ROS: positive for - uses glasses  ENT ROS: negative  Endocrine ROS: negative  Respiratory ROS: positive for - cough and shortness of breath  Cardiovascular ROS: positive for - edema and irregular heartbeat  Gastrointestinal ROS: positive for - heartburn  Genito-Urinary ROS: positive for - dysuria  Musculoskeletal ROS: positive for - joint stiffness and muscular weakness      Family History:          Problem Relation Age of Onset    Heart Disease Father      congestive heart failure    Liver Disease Mother      liver failure    Heart Disease Brother      MI all 4 brothers   [facility] Cancer Sister      colon, lung    Other Sister      lupus, kidney failure    High Blood Pressure Sister     High Cholesterol Sister     Stroke Sister     Heart Disease Sister      MI for both sisters    Cancer Sister      lung cancer both sisters    Stroke Maternal Grandmother        PHYSICAL EXAM:    BP (!) 145/51   Pulse 60   Temp 97.5 °F (36.4 °C) (Oral)   Resp 17   Ht 5' 6\" (1.676 m)   Wt 198 lb 3.1 oz (89.9 kg)   LMP 11/01/1985   SpO2 96%   BMI 31.99 kg/m²     General appearance: No apparent distress appears stated age and cooperative. HEENT Normal cephalic, atraumatic without obvious deformity. Pupils equal, round, and reactive to light. Extra ocular muscles intact. Conjunctivae/corneas clear. Neck: Supple, No jugular venous distention/bruits. Trachea midline without thyromegaly or adenopathy with full range of motion. Lungs: Clear to auscultation, bilaterally without Rales/Wheezes/Rhonchi with good respiratory effort. Heart: Regular rate and rhythm with Normal S1/S2 without murmurs, rubs or gallops, point of maximum impulse non-displaced  Abdomen: Soft, non-tender or non-distended without rigidity or guarding and positive bowel sounds all four quadrants. Extremities: No clubbing, cyanosis, or edema bilaterally. Full range of motion without deformity and normal gait intact. Skin: Skin color, texture, turgor normal.  No rashes or lesions. Neurologic: Alert and oriented X 3, neurovascularly intact with sensory/motor intact upper extremities/lower extremities, bilaterally. Cranial nerves: II-XII intact, grossly non-focal.  Mental status: Alert, oriented, thought content appropriate.     CXR:  I have reviewed the CXR with the following interpretation: bilateral airspace disease  EKG:  I have reviewed the EKG with the following interpretation: sinus bradycardia with inverted T

## 2017-12-21 NOTE — FLOWSHEET NOTE
Patient expresses frustration at hospitalization at \"this time of year\"  Also recalls the death of her  at this time  Grateful for prayer support and Communion     12/21/17 0942   Encounter Summary   Services provided to: Patient   Referral/Consult From: [address] [place] [address] Street Children;Family members; Yarsanism/leanne community;Friends/neighbors   [facility] [place]   Continue Visiting (12/21/17)   Complexity of Encounter Moderate   Length of Encounter 15 minutes   Spiritual Assessment Completed Yes   Spiritual/Presybeterian   Type Spiritual support   Assessment Coping; Approachable; Concerns with suffering; Hopeful;Grieving;Tearful   Intervention Discussed relationship with God;Discussed meaning/purpose; Active listening;Explored feelings, thoughts, concerns; Discussed illness/injury and it's impact;Prayer;Scripture;Ritual;Communion   Outcome Engaged in conversation; Shared reminiscences; Shared life review;Encouraged;Comfort;Expressed gratitude   Sacraments   Communion Patient received communion   Grief and Life Adjustment   Type Grief and loss

## 2017-12-21 NOTE — PLAN OF CARE
Problem: Pain:  Goal: Pain level will decrease  Pain level will decrease   Outcome: Met This Shift  Pt denies pain      Problem: Breathing Pattern - Ineffective:  Goal: Ability to achieve and maintain a regular respiratory rate will improve  Ability to achieve and maintain a regular respiratory rate will improve  Outcome: Ongoing  Respiratory status stable. Pt c/o SOB mainly with exertion, but no c/o or s/s of distress. Problem: HEMODYNAMIC STATUS  Goal: Patient has stable vital signs and fluid balance  Outcome: Ongoing  Pt edematous BLL. See assessment.

## 2017-12-21 NOTE — PLAN OF CARE
Problem: Pain:  Goal: Pain level will decrease  Pain level will decrease   Outcome: Met This Shift  Patient has not c/o any pain this shift upon assessment. Patient calls out appropriately     Problem: Breathing Pattern - Ineffective:  Goal: Ability to achieve and maintain a regular respiratory rate will improve  Ability to achieve and maintain a regular respiratory rate will improve   Outcome: Ongoing  Patient has been able to maintain a regular RR WDL this shift. Patient has SOB on exertion. No SOB while resting. Problem: HEMODYNAMIC STATUS  Goal: Patient has stable vital signs and fluid balance    Intervention: Blood pressure monitoring  Patient's blood pressure has been consistently monitored this shift. BP: 150/69 and 140/78 manually.    Intervention: POSITION PATIENT FOR MAXIMUM CIRCULATION/CARDIAC OUTPUT  Patient remains in an upright position >30 degrees for maximum lung expansion

## 2017-12-21 NOTE — FLOWSHEET NOTE
12/21/17 9728   Encounter Summary   Services provided to: Patient   Referral/Consult From: Rounding   Continue Visiting (12/21/17)   Complexity of Encounter Low   Length of Encounter 15 minutes   Spiritual/Samaritan   Type Ritual   Intervention Anointing   Sacraments   Sacrament of Sick-Anointing Anointed  (Fr Donald 12/21/17)

## 2017-12-21 NOTE — FLOWSHEET NOTE
Patient said she is waiting to be transferred upstairs  Her son An was present  Patient is Restorationist and she wanted to make sure she receives communion     12/20/17 1950   Encounter Summary   Services provided to: Patient   Referral/Consult From: [facility]  (Son An present)   Continue Visiting (12/20/17)   Complexity of Encounter Low   Length of Encounter 15 minutes   Spiritual Assessment Completed Yes   Routine   Type Initial   Assessment Calm; Approachable  (Smiled)   Intervention Active listening;Explored feelings, thoughts, concerns;Explored coping resources;Prayer;[place];Sustaining presence/ Ministry of presence; Discussed belief system/Restoration practices/leanne   Outcome Acceptance;Expressed gratitude;Engaged in conversation;Expressed feelings/needs/concerns;Receptive

## 2017-12-21 NOTE — FLOWSHEET NOTE
12/20/17 2310   Provider Notification   Reason for Communication Review case   Provider Name Joel Rosado   Provider Notification Physician Assistant   Method of Communication Call   Response See orders   Notification Time 2308   PA notified of pt current /78 with HR 60. RN reviewed case with PA, including 50mg PO lopressor given in ED for  and  after pt arrived to floor. PA notified that pt c/o occasional hypotension issues at home and has been seeing Dr. Mj López as outpt, who has been adjusting medications as per pt. PA ordered prn hydralazine for SBP >160 and that cardiologist will see pt in AM. See order.

## 2017-12-21 NOTE — PROGRESS NOTES
Pt updated on purpose of hydralazine for high BP. Pt states she has been feeling anxious and would like to try Ativan for now and see if it helps with BP before giving the prn hydralazine. Ativan just given. Will check BP later.

## 2017-12-22 ENCOUNTER — APPOINTMENT (OUTPATIENT)
Dept: GENERAL RADIOLOGY | Age: 77
DRG: 193 | End: 2017-12-22
Payer: MEDICARE

## 2017-12-22 LAB
-: ABNORMAL
ALBUMIN SERPL-MCNC: 3.5 G/DL (ref 3.5–5.2)
ALBUMIN/GLOBULIN RATIO: ABNORMAL (ref 1–2.5)
ALP BLD-CCNC: 72 U/L (ref 35–104)
ALT SERPL-CCNC: 35 U/L (ref 5–33)
AMORPHOUS: ABNORMAL
ANION GAP SERPL CALCULATED.3IONS-SCNC: 15 MMOL/L (ref 9–17)
AST SERPL-CCNC: 37 U/L
BACTERIA: ABNORMAL
BILIRUB SERPL-MCNC: 0.26 MG/DL (ref 0.3–1.2)
BILIRUBIN URINE: NEGATIVE
BUN BLDV-MCNC: 23 MG/DL (ref 8–23)
BUN/CREAT BLD: ABNORMAL (ref 9–20)
CALCIUM SERPL-MCNC: 8.7 MG/DL (ref 8.6–10.4)
CASTS UA: ABNORMAL /LPF
CHLORIDE BLD-SCNC: 102 MMOL/L (ref 98–107)
CO2: 21 MMOL/L (ref 20–31)
COLOR: YELLOW
COMMENT UA: ABNORMAL
CREAT SERPL-MCNC: 0.8 MG/DL (ref 0.5–0.9)
CRYSTALS, UA: ABNORMAL /HPF
EPITHELIAL CELLS UA: ABNORMAL /HPF
GFR AFRICAN AMERICAN: >60 ML/MIN
GFR NON-AFRICAN AMERICAN: >60 ML/MIN
GFR SERPL CREATININE-BSD FRML MDRD: ABNORMAL ML/MIN/{1.73_M2}
GFR SERPL CREATININE-BSD FRML MDRD: ABNORMAL ML/MIN/{1.73_M2}
GLUCOSE BLD-MCNC: 112 MG/DL (ref 70–99)
GLUCOSE URINE: NEGATIVE
HCT VFR BLD CALC: 29.8 % (ref 36–46)
HEMOGLOBIN: 8.9 G/DL (ref 12–16)
KETONES, URINE: NEGATIVE
LEUKOCYTE ESTERASE, URINE: ABNORMAL
MCH RBC QN AUTO: 19.6 PG (ref 26–34)
MCHC RBC AUTO-ENTMCNC: 29.8 G/DL (ref 31–37)
MCV RBC AUTO: 65.9 FL (ref 80–100)
MUCUS: ABNORMAL
NITRITE, URINE: NEGATIVE
OTHER OBSERVATIONS UA: ABNORMAL
PDW BLD-RTO: 21 % (ref 11.5–14.9)
PH UA: 6 (ref 5–8)
PLATELET # BLD: 359 K/UL (ref 150–450)
PMV BLD AUTO: 8.7 FL (ref 6–12)
POTASSIUM SERPL-SCNC: 4.3 MMOL/L (ref 3.7–5.3)
PROCALCITONIN: 0.08 NG/ML
PROTEIN UA: ABNORMAL
RBC # BLD: 4.53 M/UL (ref 4–5.2)
RBC UA: ABNORMAL /HPF
RENAL EPITHELIAL, UA: ABNORMAL /HPF
SODIUM BLD-SCNC: 138 MMOL/L (ref 135–144)
SPECIFIC GRAVITY UA: 1.02 (ref 1–1.03)
TOTAL PROTEIN: 6.3 G/DL (ref 6.4–8.3)
TRICHOMONAS: ABNORMAL
TURBIDITY: CLEAR
URINE HGB: NEGATIVE
UROBILINOGEN, URINE: NORMAL
WBC # BLD: 9.2 K/UL (ref 3.5–11)
WBC UA: ABNORMAL /HPF
YEAST: ABNORMAL

## 2017-12-22 PROCEDURE — 6370000000 HC RX 637 (ALT 250 FOR IP): Performed by: FAMILY MEDICINE

## 2017-12-22 PROCEDURE — 2060000000 HC ICU INTERMEDIATE R&B

## 2017-12-22 PROCEDURE — 2580000003 HC RX 258: Performed by: FAMILY MEDICINE

## 2017-12-22 PROCEDURE — 81001 URINALYSIS AUTO W/SCOPE: CPT

## 2017-12-22 PROCEDURE — 87086 URINE CULTURE/COLONY COUNT: CPT

## 2017-12-22 PROCEDURE — 85027 COMPLETE CBC AUTOMATED: CPT

## 2017-12-22 PROCEDURE — 84145 PROCALCITONIN (PCT): CPT

## 2017-12-22 PROCEDURE — 71020 XR CHEST STANDARD TWO VW: CPT

## 2017-12-22 PROCEDURE — 99239 HOSP IP/OBS DSCHRG MGMT >30: CPT | Performed by: FAMILY MEDICINE

## 2017-12-22 PROCEDURE — 36415 COLL VENOUS BLD VENIPUNCTURE: CPT

## 2017-12-22 PROCEDURE — 6360000002 HC RX W HCPCS: Performed by: FAMILY MEDICINE

## 2017-12-22 PROCEDURE — 80053 COMPREHEN METABOLIC PANEL: CPT

## 2017-12-22 RX ORDER — CEPHALEXIN 500 MG/1
500 CAPSULE ORAL 3 TIMES DAILY
Qty: 21 CAPSULE | Refills: 0 | Status: SHIPPED | OUTPATIENT
Start: 2017-12-22 | End: 2017-12-29

## 2017-12-22 RX ORDER — FUROSEMIDE 10 MG/ML
20 INJECTION INTRAMUSCULAR; INTRAVENOUS ONCE
Status: COMPLETED | OUTPATIENT
Start: 2017-12-22 | End: 2017-12-22

## 2017-12-22 RX ADMIN — AZITHROMYCIN MONOHYDRATE 500 MG: 500 INJECTION, POWDER, LYOPHILIZED, FOR SOLUTION INTRAVENOUS at 17:12

## 2017-12-22 RX ADMIN — ATORVASTATIN CALCIUM 40 MG: 40 TABLET, FILM COATED ORAL at 08:55

## 2017-12-22 RX ADMIN — METOPROLOL SUCCINATE 100 MG: 100 TABLET, EXTENDED RELEASE ORAL at 08:55

## 2017-12-22 RX ADMIN — ASPIRIN 81 MG: 81 TABLET, COATED ORAL at 08:55

## 2017-12-22 RX ADMIN — CEFTRIAXONE SODIUM 1 G: 1 INJECTION, POWDER, FOR SOLUTION INTRAMUSCULAR; INTRAVENOUS at 18:39

## 2017-12-22 RX ADMIN — APIXABAN 5 MG: 5 TABLET, FILM COATED ORAL at 08:55

## 2017-12-22 RX ADMIN — MULTIPLE VITAMINS W/ MINERALS TAB 1 TABLET: TAB at 08:55

## 2017-12-22 RX ADMIN — FUROSEMIDE 20 MG: 10 INJECTION, SOLUTION INTRAVENOUS at 08:55

## 2017-12-22 RX ADMIN — CITALOPRAM HYDROBROMIDE 20 MG: 20 TABLET ORAL at 08:55

## 2017-12-22 RX ADMIN — Medication 10 ML: at 21:14

## 2017-12-22 RX ADMIN — SODIUM CHLORIDE: 9 INJECTION, SOLUTION INTRAVENOUS at 08:31

## 2017-12-22 RX ADMIN — PANTOPRAZOLE SODIUM 40 MG: 40 TABLET, DELAYED RELEASE ORAL at 06:12

## 2017-12-22 RX ADMIN — APIXABAN 5 MG: 5 TABLET, FILM COATED ORAL at 21:13

## 2017-12-22 ASSESSMENT — PAIN SCALES - GENERAL
PAINLEVEL_OUTOF10: 0

## 2017-12-22 NOTE — PROGRESS NOTES
Mild pulmonary edema.       Increased right lower lobe opacity superimposed upon chronic changes may   represent atelectasis versus pneumonia. RN spoke with Dr. Wero Das regarding results, no new orders received. Patient is to stay in hospital another night.  Electronically signed by Carrington Larsen RN on 12/22/2017 at 11:45 AM

## 2017-12-22 NOTE — PLAN OF CARE
Problem: Pain:  Goal: Pain level will decrease  Pain level will decrease   Outcome: Ongoing  No pain at this time. 0/10 pain scale. Problem: Breathing Pattern - Ineffective:  Goal: Ability to achieve and maintain a regular respiratory rate will improve  Ability to achieve and maintain a regular respiratory rate will improve   Outcome: Ongoing  Patients respiratory status stable at this time. No signs or symptoms of distress noted. Respirations non-labored and regular. Nasal canula 02 in place as needed to maintain sp02>90% or per md order. Continuous SpO2 monitoring in place.

## 2017-12-22 NOTE — CARE COORDINATION
ONGOING DISCHARGE PLAN:    Spoke with patient regarding discharge plan and patient confirms that plan is to return home  Feeling better, is on IV zithro and rocephin  Repeat cxr-   Mild pulmonary edema.       Increased right lower lobe opacity superimposed upon chronic changes may   represent atelectasis versus pneumonia     Per pcp will have pt stay another night and watch    Will continue to follow for additional discharge needs.     Electronically signed by Bernice Davidson RN on 12/22/2017 at 1:52 PM

## 2017-12-22 NOTE — PROGRESS NOTES
mg at 12/22/17 4330    furosemide (LASIX) tablet 10 mg  10 mg Oral Daily PRN Prashant Tucker MD        Pirfenidone CAPS 3 capsule  3 capsule Oral TID Prashant Tucker MD   2 capsule at 12/21/17 2059    hydrALAZINE (APRESOLINE) injection 10 mg  10 mg Intravenous Q4H PRN Sachi Miller PA-C        aspirin EC tablet 81 mg  81 mg Oral Daily Prashant Tucker MD   81 mg at 12/21/17 0047    atorvastatin (LIPITOR) tablet 40 mg  40 mg Oral Daily Prashant Tucker MD   40 mg at 12/21/17 0047       Intake/Output Summary (Last 24 hours) at 12/22/17 0641  Last data filed at 12/21/17 1732   Gross per 24 hour   Intake              331 ml   Output              250 ml   Net               81 ml     Recent Results (from the past 24 hour(s))   CBC    Collection Time: 12/22/17  5:22 AM   Result Value Ref Range    WBC 9.2 3.5 - 11.0 k/uL    RBC 4.53 4.0 - 5.2 m/uL    Hemoglobin 8.9 (L) 12.0 - 16.0 g/dL    Hematocrit 29.8 (L) 36 - 46 %    MCV 65.9 (L) 80 - 100 fL    MCH 19.6 (L) 26 - 34 pg    MCHC 29.8 (L) 31 - 37 g/dL    RDW 21.0 (H) 11.5 - 14.9 %    Platelets 088 681 - 560 k/uL    MPV 8.7 6.0 - 12.0 fL   Comprehensive Metabolic Panel    Collection Time: 12/22/17  5:22 AM   Result Value Ref Range    Glucose 112 (H) 70 - 99 mg/dL    BUN 23 8 - 23 mg/dL    CREATININE 0.80 0.50 - 0.90 mg/dL    Bun/Cre Ratio NOT REPORTED 9 - 20    Calcium 8.7 8.6 - 10.4 mg/dL    Sodium 138 135 - 144 mmol/L    Potassium 4.3 3.7 - 5.3 mmol/L    Chloride 102 98 - 107 mmol/L    CO2 21 20 - 31 mmol/L    Anion Gap 15 9 - 17 mmol/L    Alkaline Phosphatase 72 35 - 104 U/L    ALT 35 (H) 5 - 33 U/L    AST 37 (H) <32 U/L    Total Bilirubin 0.26 (L) 0.3 - 1.2 mg/dL    Total Protein 6.3 (L) 6.4 - 8.3 g/dL    Alb 3.5 3.5 - 5.2 g/dL    Albumin/Globulin Ratio NOT REPORTED 1.0 - 2.5    GFR Non-African American >60 >60 mL/min    GFR African American >60 >60 mL/min    GFR Comment          GFR Staging NOT REPORTED -----------------------------------------------------------------  RAD:  EKG:  Micro:     Assessment & Plan:    Patient Active Problem List:     GERD (gastroesophageal reflux disease)     Anxiety     Lipids abnormal     Hypertension     Heart disease     Atrial fibrillation (HCC)     Pulmonary fibrosis (HCC)     Pneumonia     Mixed hyperlipidemia     Acute cystitis without hematuria           Plan:  Continue current treatments. Repeat CXR & U/A. Possible D/C home later today. Follow up in office within 2 weeks or prn.     See orders   Disposition:    Electronically signed by James Sales MD on 12/22/2017 at 6:41 AM

## 2017-12-22 NOTE — H&P
mellitus type 2, stroke, COPD, Asthma,   Cancer, Seizures,Thyroid disease, Kidney Disease, Hepatitis, TB, Psychiatric Disorders or Substance abuse. SURGICAL HISTORY       Past Surgical History:   Procedure Laterality Date    BREAST BIOPSY  1987    left benign    BYPASS GRAFT N/A     3 cardiac bypasses. Most recent 2010. [address]; 01\2010    triple bypass (2)    CARDIOVERSION      10/25/16    CHOLECYSTECTOMY  1998       FAMILY HISTORY       Family History   Problem Relation Age of Onset    Heart Disease Father      congestive heart failure    Liver Disease Mother      liver failure    Heart Disease Brother      MI all 4 brothers   [facility] Cancer Sister      colon, lung    Other Sister      lupus, kidney failure    High Blood Pressure Sister     High Cholesterol Sister     Stroke Sister     Heart Disease Sister      MI for both sisters    Cancer Sister      lung cancer both sisters    Stroke Maternal Grandmother        SOCIAL HISTORY       Social History     Social History    Marital status:      Spouse name: N/A    Number of children: N/A    Years of education: N/A     Social History Main Topics    Smoking status: Former Smoker     Years: 25.00    Smokeless tobacco: None    Alcohol use Yes      Comment: occ    Drug use: No    Sexual activity: No     Other Topics Concern    None     Social History Narrative    None           REVIEW OF SYSTEMS      Allergies   Allergen Reactions    Penicillins Rash       No current facility-administered medications on file prior to encounter.       Current Outpatient Prescriptions on File Prior to Encounter   Medication Sig Dispense Refill    omeprazole (PRILOSEC) 40 MG delayed release capsule TAKE 1 CAPSULE EVERY DAY 90 capsule 1    LORazepam (ATIVAN) 0.5 MG tablet TAKE ONE TABLET BY MOUTH THREE TIMES DAILY AS NEEDED FOR ANXIETY 90 tablet 0    citalopram (CELEXA) 20 MG tablet Take 1 tablet by mouth daily 30 tablet 3    aspirin 81 MG tablet Take 81 mg by mouth daily      albuterol sulfate HFA (VENTOLIN HFA) 108 (90 BASE) MCG/ACT inhaler Inhale 2 puffs into the lungs every 6 hours as needed for Wheezing 1 Inhaler 3    apixaban (ELIQUIS) 5 MG TABS tablet Take by mouth 2 times daily      Multiple Vitamins-Minerals (THERAPEUTIC MULTIVITAMIN-MINERALS) tablet Take 1 tablet by mouth daily      Coenzyme Q10 (CO Q-10) 200 MG CAPS Take by mouth Daily      metoprolol (TOPROL-XL) 100 MG XL tablet Take 100 mg by mouth daily       atorvastatin (LIPITOR) 40 MG tablet Take 40 mg by mouth daily.  nitroGLYCERIN (NITROSTAT) 0.4 MG SL tablet Place 0.4 mg under the tongue every 5 minutes as needed for Chest pain up to max of 3 total doses. If no relief after 1 dose, call 911.  nitroGLYCERIN (NITROLINGUAL) 0.4 MG/SPRAY spray Place 1 spray under the tongue every 5 minutes as needed. General health:  Fairly good. No fever or chills. Skin:  No itching, redness or rash. Head, eyes, ears, nose, throat:  No headache, epistaxis, rhinorrhea hearing loss or sore throat. Some hearing loss,    Neck:  No pain, stiffness or masses. Cardiovascular/Respiratory system:  No chest pain, palpitation, shortness of breath, coughing or expectoration. Pulmonary fibrosis. Nasal oxygen with concentrator,              Gastrointestinal tract: No abdominal pain, nausea, vomiting, diarrhea or constipation. GERD. Genitourinary:  No burning on micturition. No hesitancy, urgency, frequency or discoloration of urine. Current UTI,    Locomotor:  No bone or joint pains. No swelling or deformities. Arthritis,     Neuropsychiatric:  No referable complaints. Depression, some tendency for anxiety. See HPI.      GENERAL PHYSICAL EXAM:         Vitals: BP (!) 139/59   Pulse 69   Temp 97.9 °F (36.6 °C)   Resp 19   Ht 5' 6\" (1.676 m)   Wt 198 lb 3.1 oz (89.9 kg)   LMP 11/01/1985   SpO2 94%   BMI 31.99 kg/m²  Body mass index is 31.99 kg/m². GENERAL APPEARANCE:  LenPhoenix Yana is 76 y.o.,  female, mildly obese, nourished, conscious, alert. Does not appear to be distress or pain at this time. SKIN:  Warm, dry, no cyanosis or jaundice. HEAD:  Normocephalic, atraumatic, no swelling or tenderness. EYES:  Pupils equal, reactive to light, Conjunctiva is clear, EOMs intact salome. eyelids WNL. EARS:  No discharge, no marked hearing loss. NOSE:  No rhinorrhea, epistaxis or septal deformity. Nasal oxygen per cannula,    THROAT:  Not congested. No ulceration bleeding or discharge. NECK:  No stiffness, trachea central.  No palpable masses or L.N.      CHEST:  Symmetrical and equal on expansion. HEART:  Regular rate and rhythm. S1 > S2, No audible murmurs or gallops. LUNGS:  Equal on expansion, diminished breath sounds. No adventitious sounds. ABDOMEN:     Soft on palpation. No localized tenderness. No guarding or rigidity. No palpable organomegaly. LYMPHATICS:  No palpable cervical Lymphadenopathy. LOCOMOTOR, BACK AND SPINE:  No tenderness or deformities. EXTREMITIES:  Symmetrical, no pedal edema. Jaydas sign negative. No discoloration or ulcerations. NEUROLOGIC:  The patient is conscious, alert, oriented. No apparent focal sensory deficits. No motor deficits, muscle strength equal Salome. No facial droop, tongue protrudes centrally, no slurring of the speech.      PROVISIONAL DIAGNOSES:      Principal Problem:    Pneumonia  Active Problems:    GERD (gastroesophageal reflux disease)    Anxiety    Hypertension    Atrial fibrillation (HCC)    Pulmonary fibrosis (HCC)    Mixed hyperlipidemia    Acute cystitis without hematuria    Past Medical History:   Diagnosis Date    Anxiety     Atrial [facility] ([facility] [address]     Orantes's esophagus     GERD (gastroesophageal reflux disease)     Heart disease     triple bypass    Hypertension     Lipids abnormal     Pulmonary fibrosis ([facility]      DYLAN KIM NP on 12/22/2017 at 4:22 PM

## 2017-12-22 NOTE — CONSULTS
Intravenous Q4H PRN Damian English PA-C        aspirin EC tablet 81 mg  81 mg Oral Daily Ajit Barr MD   81 mg at 12/22/17 5420    atorvastatin (LIPITOR) tablet 40 mg  40 mg Oral Daily Ajit Barr MD   40 mg at 12/22/17 7054      PULMONARY  CONSULT NOTE      Date of Admission: 12/20/2017  3:03 PM    Reason for Consult: pulm fibrosis, pneumonia    Referring Physician:   PCP: Ajit Barr MD     History of Present Illness: Yamile Longoria is a 76 y.o. White   female who presents with overall not feeling well and was recommended to come to hospital; occ cough; has underlying pulm fibrosis, LIANET on [address]    Problem:  Principal Problem:     PMH:   Past Medical History:   Diagnosis Date    Anxiety     Atrial fibrillation ([address]     Orantes's esophagus     GERD (gastroesophageal reflux disease)     Heart disease     triple bypass    Hypertension     Lipids abnormal     Pulmonary fibrosis (HCC)        PSH:   Past Surgical History:   Procedure Laterality Date    BREAST BIOPSY  1987    left benign    BYPASS GRAFT N/A     3 cardiac bypasses. Most recent 2010. [address] [facility]; 01\2010    triple bypass (2)    CARDIOVERSION      10/25/16    CHOLECYSTECTOMY  1998       Allergies:    Allergies   Allergen Reactions    Penicillins Rash       Home Meds:  Prescriptions Prior to Admission: furosemide (LASIX) 20 MG tablet, Take 10 mg by mouth daily as needed  Pirfenidone 267 MG CAPS, Take 3 capsules by mouth 3 times daily  omeprazole (PRILOSEC) 40 MG delayed release capsule, TAKE 1 CAPSULE EVERY DAY  LORazepam (ATIVAN) 0.5 MG tablet, TAKE ONE TABLET BY MOUTH THREE TIMES DAILY AS NEEDED FOR ANXIETY  citalopram (CELEXA) 20 MG tablet, Take 1 tablet by mouth daily  aspirin 81 MG tablet, Take 81 mg by mouth daily  albuterol sulfate HFA (VENTOLIN HFA) 108 (90 BASE) MCG/ACT inhaler, Inhale 2 puffs into the lungs every 6 hours as needed for Wheezing  apixaban (ELIQUIS) 5 MG TABS tablet, Take by mouth 2 times daily  Multiple Vitamins-Minerals (THERAPEUTIC MULTIVITAMIN-MINERALS) tablet, Take 1 tablet by mouth daily  Coenzyme Q10 (CO Q-10) 200 MG CAPS, Take by mouth Daily  metoprolol (TOPROL-XL) 100 MG XL tablet, Take 100 mg by mouth daily   atorvastatin (LIPITOR) 40 MG tablet, Take 40 mg by mouth daily. nitroGLYCERIN (NITROSTAT) 0.4 MG SL tablet, Place 0.4 mg under the tongue every 5 minutes as needed for Chest pain up to max of 3 total doses. If no relief after 1 dose, call 911.  nitroGLYCERIN (NITROLINGUAL) 0.4 MG/SPRAY spray, Place 1 spray under the tongue every 5 minutes as needed. Social History:   Social History     Social History    Marital status:      Spouse name: N/A    Number of children: N/A    Years of education: N/A     Occupational History    Not on file.      Social History Main Topics    Smoking status: Former Smoker     Years: 25.00    Smokeless tobacco: Not on file    Alcohol use Yes      Comment: occ    Drug use: No    Sexual activity: No     Other Topics Concern    Not on file     Social History Narrative    No narrative on file       Family History:   Family History   Problem Relation Age of Onset    Heart Disease Father      congestive heart failure    Liver Disease Mother      liver failure    Heart Disease Brother      MI all 4 brothers   [facility] Cancer Sister      colon, lung    Other Sister      lupus, kidney failure    High Blood Pressure Sister     High Cholesterol Sister     Stroke Sister     Heart Disease Sister      MI for both sisters    Cancer Sister      lung cancer both sisters    Stroke Maternal Grandmother        Review of Systems  Fever/ chills - no  Chest pain - no  Cough - ++  Expectoration / hemoptysis - no  shortness of breath - ++  Headache - no  Sinus drainage/ sore throat - no  abdominal pain - no  Swelling feet - +  Nausea/ vomiting/ diarrhea/ constipation - no  Overall not feeling well  Physical Exam  Vital Signs: BP (!) 139/59   Pulse 69

## 2017-12-23 VITALS
TEMPERATURE: 97.5 F | OXYGEN SATURATION: 96 % | SYSTOLIC BLOOD PRESSURE: 156 MMHG | RESPIRATION RATE: 16 BRPM | HEIGHT: 66 IN | HEART RATE: 67 BPM | BODY MASS INDEX: 31.85 KG/M2 | DIASTOLIC BLOOD PRESSURE: 77 MMHG | WEIGHT: 198.19 LBS

## 2017-12-23 LAB
ALBUMIN SERPL-MCNC: 3.3 G/DL (ref 3.5–5.2)
ALBUMIN/GLOBULIN RATIO: ABNORMAL (ref 1–2.5)
ALP BLD-CCNC: 72 U/L (ref 35–104)
ALT SERPL-CCNC: 35 U/L (ref 5–33)
ANION GAP SERPL CALCULATED.3IONS-SCNC: 14 MMOL/L (ref 9–17)
AST SERPL-CCNC: 33 U/L
BILIRUB SERPL-MCNC: 0.26 MG/DL (ref 0.3–1.2)
BUN BLDV-MCNC: 21 MG/DL (ref 8–23)
BUN/CREAT BLD: ABNORMAL (ref 9–20)
CALCIUM SERPL-MCNC: 8.9 MG/DL (ref 8.6–10.4)
CHLORIDE BLD-SCNC: 104 MMOL/L (ref 98–107)
CO2: 22 MMOL/L (ref 20–31)
CREAT SERPL-MCNC: 0.82 MG/DL (ref 0.5–0.9)
CULTURE: NO GROWTH
CULTURE: NORMAL
GFR AFRICAN AMERICAN: >60 ML/MIN
GFR NON-AFRICAN AMERICAN: >60 ML/MIN
GFR SERPL CREATININE-BSD FRML MDRD: ABNORMAL ML/MIN/{1.73_M2}
GFR SERPL CREATININE-BSD FRML MDRD: ABNORMAL ML/MIN/{1.73_M2}
GLUCOSE BLD-MCNC: 106 MG/DL (ref 70–99)
HCT VFR BLD CALC: 29.3 % (ref 36–46)
HEMOGLOBIN: 8.6 G/DL (ref 12–16)
LV EF: 53 %
LVEF MODALITY: NORMAL
Lab: NORMAL
MCH RBC QN AUTO: 19.2 PG (ref 26–34)
MCHC RBC AUTO-ENTMCNC: 29.5 G/DL (ref 31–37)
MCV RBC AUTO: 65.1 FL (ref 80–100)
PDW BLD-RTO: 20.9 % (ref 11.5–14.9)
PLATELET # BLD: 357 K/UL (ref 150–450)
PMV BLD AUTO: 8.9 FL (ref 6–12)
POTASSIUM SERPL-SCNC: 3.9 MMOL/L (ref 3.7–5.3)
RBC # BLD: 4.5 M/UL (ref 4–5.2)
SODIUM BLD-SCNC: 140 MMOL/L (ref 135–144)
SPECIMEN DESCRIPTION: NORMAL
SPECIMEN DESCRIPTION: NORMAL
STATUS: NORMAL
TOTAL PROTEIN: 6.1 G/DL (ref 6.4–8.3)
WBC # BLD: 9.2 K/UL (ref 3.5–11)

## 2017-12-23 PROCEDURE — 99239 HOSP IP/OBS DSCHRG MGMT >30: CPT | Performed by: INTERNAL MEDICINE

## 2017-12-23 PROCEDURE — 80053 COMPREHEN METABOLIC PANEL: CPT

## 2017-12-23 PROCEDURE — 2580000003 HC RX 258: Performed by: FAMILY MEDICINE

## 2017-12-23 PROCEDURE — 85027 COMPLETE CBC AUTOMATED: CPT

## 2017-12-23 PROCEDURE — 36415 COLL VENOUS BLD VENIPUNCTURE: CPT

## 2017-12-23 PROCEDURE — 6370000000 HC RX 637 (ALT 250 FOR IP): Performed by: FAMILY MEDICINE

## 2017-12-23 PROCEDURE — 93306 TTE W/DOPPLER COMPLETE: CPT

## 2017-12-23 RX ADMIN — CITALOPRAM HYDROBROMIDE 20 MG: 20 TABLET ORAL at 07:41

## 2017-12-23 RX ADMIN — Medication 10 ML: at 07:43

## 2017-12-23 RX ADMIN — MULTIPLE VITAMINS W/ MINERALS TAB 1 TABLET: TAB at 07:41

## 2017-12-23 RX ADMIN — ASPIRIN 81 MG: 81 TABLET, COATED ORAL at 07:41

## 2017-12-23 RX ADMIN — PANTOPRAZOLE SODIUM 40 MG: 40 TABLET, DELAYED RELEASE ORAL at 05:43

## 2017-12-23 RX ADMIN — METOPROLOL SUCCINATE 100 MG: 100 TABLET, EXTENDED RELEASE ORAL at 07:41

## 2017-12-23 RX ADMIN — ATORVASTATIN CALCIUM 40 MG: 40 TABLET, FILM COATED ORAL at 07:41

## 2017-12-23 RX ADMIN — APIXABAN 5 MG: 5 TABLET, FILM COATED ORAL at 07:41

## 2017-12-23 ASSESSMENT — PAIN SCALES - WONG BAKER
WONGBAKER_NUMERICALRESPONSE: 0

## 2017-12-23 ASSESSMENT — PAIN SCALES - GENERAL
PAINLEVEL_OUTOF10: 0

## 2017-12-23 NOTE — DISCHARGE INSTR - DIET

## 2017-12-23 NOTE — CARE COORDINATION
ONGOING DISCHARGE PLAN:     Spoke with patient regarding discharge plan and patient confirms that plan is to return home  Continues  on IV zithro and rocephin  Per pulmonary; Dx Pulm Fibrosis - on Esbriet       Hypoxic resp failure- on O2       UTI - ABx       LIANET - On PAP       CHF more likely than PNA  Pulm Nodule - PET scan scheduled 1/2018  Increase activity  Cardiac echo - results pending  Urine culture pending   Will continue to follow for additional discharge needs.   Electronically signed by Nuzhat Tillman RN on 12/23/2017 at 1:23 PM

## 2017-12-23 NOTE — DISCHARGE SUMMARY
opacity superimposed upon chronic changes may represent atelectasis versus pneumonia. Xr Chest Standard (2 Vw)    Result Date: 12/20/2017  EXAMINATION: TWO VIEWS OF THE CHEST 12/20/2017 3:45 pm COMPARISON: November 4, 2014 HISTORY: ORDERING SYSTEM PROVIDED HISTORY: cough TECHNOLOGIST PROVIDED HISTORY: Reason for exam:->cough Ordering Physician Provided Reason for Exam: low BP, weak Acuity: Unknown Type of Exam: Unknown FINDINGS: Sternotomy wires are noted. There are bibasilar interstitial infiltrates in the right upper lobe interstitial infiltrate. There is a moderately large hiatal hernia. There is mild to moderate cardiomegaly. There is calcified plaque in the aortic arch. Mediastinal clips are noted. The bony thorax is intact. Bilateral airspace disease consistent with possible pneumonia. Recommend follow-up to resolution. Other incidental findings as above. Lorie Digital Screen Self Referral W Or Wo Cad Bilateral    Result Date: 12/4/2017  EXAMINATION: BILATERAL DIGITAL SCREENING MAMMOGRAM, 12/4/2017 TECHNIQUE: CC and MLO views of the left and right breasts were obtained. Computer aided detection was utilized in the interpretation of this exam. 3D tomosynthesis was utilized in evaluating the study. COMPARISON: 21 November 2016; 9 September 2015; 3 April 2014 HISTORY: Screening. Negative family history of breast cancer. Negative history of hormonal replacement therapy. Prior left excisional biopsy in 1984 with benign histology FINDINGS: The breasts are composed of scattered fibroglandular density. No skin thickening, nipple contour changes, malignant type microcalcifications, areas of architectural distortion, or significant interval changes are noted. No mammographic evidence of malignancy. Advise annual screening mammography.  BIRADS: BIRADS - CATEGORY 1 Negative, no evidence of malignancy in either breast. OVERALL ASSESSMENT - NEGATIVE A letter of notification will be sent to the patient regarding the results. RECOMMENDATION: Routine bilateral annual screening mammography is recommended. Follow-up screening mammogram in 1 year is advised. Consultations:    Consults:     Final Specialist Recommendations/Findings:   IP CONSULT TO INTERNAL MEDICINE  IP CONSULT TO CARDIOLOGY  IP CONSULT TO HISTORY AND PHYSICAL  IP CONSULT TO PULMONOLOGY      The patient was seen and examined on day of discharge and this discharge summary is in conjunction with any daily progress note from day of discharge. Discharge plan:     Disposition: Home    Physician Follow Up:      Brandi Gutierrez MD  [address] [facility]  [address] [facility] [address]  [address] [facility]  [phone]             Requiring Further Evaluation/Follow Up POST HOSPITALIZATION/Incidental Findings:     Diet: regular diet    Activity: As tolerated    Instructions to Patient:     Discharge Medications:      Medication List      START taking these medications    cephALEXin 500 MG capsule  Commonly known as:  KEFLEX  Take 1 capsule by mouth 3 times daily for 7 days        CONTINUE taking these medications    albuterol sulfate  (90 Base) MCG/ACT inhaler  Commonly known as:  VENTOLIN HFA  Inhale 2 puffs into the lungs every 6 hours as needed for Wheezing     aspirin 81 MG tablet     atorvastatin 40 MG tablet  Commonly known as:  LIPITOR     citalopram 20 MG tablet  Commonly known as:  CELEXA  Take 1 tablet by mouth daily     Co Q-10 200 MG Caps     ELIQUIS 5 MG Tabs tablet  Generic drug:  apixaban     furosemide 20 MG tablet  Commonly known as:  LASIX     LORazepam 0.5 MG tablet  Commonly known as:  ATIVAN  TAKE ONE TABLET BY MOUTH THREE TIMES DAILY AS NEEDED FOR ANXIETY     metoprolol succinate 100 MG extended release tablet  Commonly known as:  TOPROL XL     * nitroGLYCERIN 0.4 MG SL tablet  Commonly known as:  NITROSTAT     * nitroGLYCERIN 0.4 MG/SPRAY 0.4 mg spray  Commonly known as:  NITROLINGUAL     omeprazole 40 MG delayed release

## 2017-12-23 NOTE — PLAN OF CARE
Problem: Pain:  Goal: Pain level will decrease  Pain level will decrease   Outcome: Ongoing  Pt medicated with pain medication prn. Assessed all pain characteristics including level, type, location, frequency, and onset. Non-pharmacologic interventions offered to pt as well. Pt states pain is tolerable at this time. Will continue to monitor. Problem: Breathing Pattern - Ineffective:  Goal: Ability to achieve and maintain a regular respiratory rate will improve  Ability to achieve and maintain a regular respiratory rate will improve   Outcome: Ongoing  Patients respiratory status stable at this time. No signs or symptoms of distress noted. Respirations non-labored and regular. Nasal canula 02 in place as needed to maintain sp02>90% or per md order. Continuous SpO2 monitoring in place.

## 2017-12-26 NOTE — PROGRESS NOTES
.Date Patient Discharged:12/22/17      Today's Date:12/26/17      Spoke with:Pt      Do you understand the purpose of your medications?:  Yes    Do you understand the side effects of your new medications?:  Yes    Would you like to speak with a pharmacist about any of your medications or the side effects?:  No    Were you able to get your prescriptions filled?:  Yes    Did you understand your discharge instructions and what you are responsible for in managing your health after discharge?:  Weren't really given any    While you were here, was your call light answered promptly?:  Yes    Was the area around your room kept quiet at night?:  Yes    Were your room and bathroom kept clean?:Yes

## 2017-12-27 ENCOUNTER — HOSPITAL ENCOUNTER (EMERGENCY)
Age: 77
Discharge: HOME | End: 2017-12-27
Attending: EMERGENCY MEDICINE
Payer: MEDICARE

## 2017-12-27 VITALS
DIASTOLIC BLOOD PRESSURE: 74 MMHG | OXYGEN SATURATION: 93 % | SYSTOLIC BLOOD PRESSURE: 173 MMHG | TEMPERATURE: 98.3 F | HEART RATE: 64 BPM | RESPIRATION RATE: 16 BRPM

## 2017-12-27 DIAGNOSIS — R55 NEAR SYNCOPE: Primary | ICD-10-CM

## 2017-12-27 DIAGNOSIS — R53.1 GENERAL WEAKNESS: ICD-10-CM

## 2017-12-27 LAB
ABSOLUTE EOS #: 0.09 K/UL (ref 0–0.4)
ABSOLUTE IMMATURE GRANULOCYTE: ABNORMAL K/UL (ref 0–0.3)
ABSOLUTE LYMPH #: 0.6 K/UL (ref 1–4.8)
ABSOLUTE MONO #: 0.34 K/UL (ref 0.1–1.3)
ANION GAP SERPL CALCULATED.3IONS-SCNC: 16 MMOL/L (ref 9–17)
BASOPHILS # BLD: 1 % (ref 0–2)
BASOPHILS ABSOLUTE: 0.09 K/UL (ref 0–0.2)
BUN BLDV-MCNC: 23 MG/DL (ref 8–23)
BUN/CREAT BLD: ABNORMAL (ref 9–20)
CALCIUM SERPL-MCNC: 9 MG/DL (ref 8.6–10.4)
CHLORIDE BLD-SCNC: 100 MMOL/L (ref 98–107)
CO2: 21 MMOL/L (ref 20–31)
CREAT SERPL-MCNC: 0.99 MG/DL (ref 0.5–0.9)
DIFFERENTIAL TYPE: ABNORMAL
EKG ATRIAL RATE: 60 BPM
EKG P AXIS: 5 DEGREES
EKG P-R INTERVAL: 254 MS
EKG Q-T INTERVAL: 480 MS
EKG QRS DURATION: 96 MS
EKG QTC CALCULATION (BAZETT): 480 MS
EKG R AXIS: 22 DEGREES
EKG T AXIS: 21 DEGREES
EKG VENTRICULAR RATE: 60 BPM
EOSINOPHILS RELATIVE PERCENT: 1 % (ref 0–4)
GFR AFRICAN AMERICAN: >60 ML/MIN
GFR NON-AFRICAN AMERICAN: 54 ML/MIN
GFR SERPL CREATININE-BSD FRML MDRD: ABNORMAL ML/MIN/{1.73_M2}
GFR SERPL CREATININE-BSD FRML MDRD: ABNORMAL ML/MIN/{1.73_M2}
GLUCOSE BLD-MCNC: 205 MG/DL (ref 70–99)
HCT VFR BLD CALC: 31 % (ref 36–46)
HEMOGLOBIN: 9.2 G/DL (ref 12–16)
IMMATURE GRANULOCYTES: ABNORMAL %
LYMPHOCYTES # BLD: 7 % (ref 24–44)
MAGNESIUM: 1.8 MG/DL (ref 1.6–2.6)
MCH RBC QN AUTO: 19.6 PG (ref 26–34)
MCHC RBC AUTO-ENTMCNC: 29.7 G/DL (ref 31–37)
MCV RBC AUTO: 66.2 FL (ref 80–100)
MONOCYTES # BLD: 4 % (ref 1–7)
MORPHOLOGY: ABNORMAL
MYOGLOBIN: 68 NG/ML (ref 25–58)
MYOGLOBIN: 75 NG/ML (ref 25–58)
PDW BLD-RTO: 20.9 % (ref 11.5–14.9)
PLATELET # BLD: 392 K/UL (ref 150–450)
PLATELET ESTIMATE: ABNORMAL
PMV BLD AUTO: 9 FL (ref 6–12)
POTASSIUM SERPL-SCNC: 4 MMOL/L (ref 3.7–5.3)
RBC # BLD: 4.68 M/UL (ref 4–5.2)
RBC # BLD: ABNORMAL 10*6/UL
SEG NEUTROPHILS: 87 % (ref 36–66)
SEGMENTED NEUTROPHILS ABSOLUTE COUNT: 7.48 K/UL (ref 1.3–9.1)
SODIUM BLD-SCNC: 137 MMOL/L (ref 135–144)
TROPONIN INTERP: ABNORMAL
TROPONIN INTERP: ABNORMAL
TROPONIN T: <0.03 NG/ML
TROPONIN T: <0.03 NG/ML
WBC # BLD: 8.6 K/UL (ref 3.5–11)
WBC # BLD: ABNORMAL 10*3/UL

## 2017-12-27 PROCEDURE — 85025 COMPLETE CBC W/AUTO DIFF WBC: CPT

## 2017-12-27 PROCEDURE — 80048 BASIC METABOLIC PNL TOTAL CA: CPT

## 2017-12-27 PROCEDURE — 84484 ASSAY OF TROPONIN QUANT: CPT

## 2017-12-27 PROCEDURE — 93005 ELECTROCARDIOGRAM TRACING: CPT

## 2017-12-27 PROCEDURE — 99285 EMERGENCY DEPT VISIT HI MDM: CPT

## 2017-12-27 PROCEDURE — 83735 ASSAY OF MAGNESIUM: CPT

## 2017-12-27 PROCEDURE — 36415 COLL VENOUS BLD VENIPUNCTURE: CPT

## 2017-12-27 PROCEDURE — 83874 ASSAY OF MYOGLOBIN: CPT

## 2017-12-27 RX ORDER — 0.9 % SODIUM CHLORIDE 0.9 %
1000 INTRAVENOUS SOLUTION INTRAVENOUS ONCE
Status: DISCONTINUED | OUTPATIENT
Start: 2017-12-27 | End: 2017-12-27 | Stop reason: HOSPADM

## 2017-12-27 ASSESSMENT — ENCOUNTER SYMPTOMS
VOMITING: 0
RHINORRHEA: 0
EYE REDNESS: 0
BACK PAIN: 0
COUGH: 0
EYE PAIN: 0
ABDOMINAL PAIN: 0
SHORTNESS OF BREATH: 0
NAUSEA: 0

## 2017-12-27 NOTE — ED PROVIDER NOTES
benign    BYPASS GRAFT N/A     3 cardiac bypasses. Most recent . [address];     triple bypass (2)    CARDIOVERSION      10/25/16    CHOLECYSTECTOMY         CURRENT MEDICATIONS       Previous Medications    ALBUTEROL SULFATE HFA (VENTOLIN HFA) 108 (90 BASE) MCG/ACT INHALER    Inhale 2 puffs into the lungs every 6 hours as needed for Wheezing    APIXABAN (ELIQUIS) 5 MG TABS TABLET    Take by mouth 2 times daily    ASPIRIN 81 MG TABLET    Take 81 mg by mouth daily    ATORVASTATIN (LIPITOR) 40 MG TABLET    Take 40 mg by mouth daily. CEPHALEXIN (KEFLEX) 500 MG CAPSULE    Take 1 capsule by mouth 3 times daily for 7 days    CITALOPRAM (CELEXA) 20 MG TABLET    Take 1 tablet by mouth daily    COENZYME Q10 (CO Q-10) 200 MG CAPS    Take by mouth Daily    FUROSEMIDE (LASIX) 20 MG TABLET    Take 10 mg by mouth daily as needed    LORAZEPAM (ATIVAN) 0.5 MG TABLET    TAKE ONE TABLET BY MOUTH THREE TIMES DAILY AS NEEDED FOR ANXIETY    METOPROLOL (TOPROL-XL) 100 MG XL TABLET    Take 100 mg by mouth daily     MULTIPLE VITAMINS-MINERALS (THERAPEUTIC MULTIVITAMIN-MINERALS) TABLET    Take 1 tablet by mouth daily    NITROGLYCERIN (NITROLINGUAL) 0.4 MG/SPRAY SPRAY    Place 1 spray under the tongue every 5 minutes as needed. NITROGLYCERIN (NITROSTAT) 0.4 MG SL TABLET    Place 0.4 mg under the tongue every 5 minutes as needed for Chest pain up to max of 3 total doses. If no relief after 1 dose, call 911.     OMEPRAZOLE (PRILOSEC) 40 MG DELAYED RELEASE CAPSULE    TAKE 1 CAPSULE EVERY DAY    PIRFENIDONE 267 MG CAPS    Take 3 capsules by mouth 3 times daily       ALLERGIES     Penicillins    FAMILY HISTORY       Family Status   Relation Status    Father  at age 70    Mother  at age 80    Brother     Sister Alive    Sister     Maternal Grandmother     Maternal Grandfather     Paternal Grandmother     Paternal Grandfather  near syncopal episode while getting out of the shower. A couple episodes of diarrhea as well. Patient with improved symptoms at this time. We will check some basic blood work and give her some fluids    1530: Patient continues stable condition. Workup thus far is unremarkable for acute specific pathology.   Patient this time wants to go home however he we will do a repeat troponin    DIAGNOSTIC RESULTS     EKG: All EKG's are interpreted by the Emergency Department Physician who either signs or Co-signs this chart in the absence of a cardiologist.    EKG Interpretation    Interpreted by emergency department physician    Rhythm: normal sinus   Rate: normal  Axis: normal  Ectopy: none  Conduction: 1st degree AV block  ST Segments: nonspecific changes  T Waves: non specific changes  Q Waves: none    EKG  Impression: non-specific EKG      LABS:  Labs Reviewed   TROP/MYOGLOBIN - Abnormal; Notable for the following:        Result Value    Myoglobin 75 (*)     All other components within normal limits   CBC WITH AUTO DIFFERENTIAL - Abnormal; Notable for the following:     Hemoglobin 9.2 (*)     Hematocrit 31.0 (*)     MCV 66.2 (*)     MCH 19.6 (*)     MCHC 29.7 (*)     RDW 20.9 (*)     Seg Neutrophils 87 (*)     Lymphocytes 7 (*)     Absolute Lymph # 0.60 (*)     All other components within normal limits   BASIC METABOLIC PANEL - Abnormal; Notable for the following:     Glucose 205 (*)     CREATININE 0.99 (*)     GFR Non- 54 (*)     All other components within normal limits   TROP/MYOGLOBIN - Abnormal; Notable for the following:     Myoglobin 68 (*)     All other components within normal limits   MAGNESIUM   URINALYSIS           EMERGENCY DEPARTMENT COURSE:   Vitals:    Vitals:    12/27/17 1515 12/27/17 1530 12/27/17 1536 12/27/17 1545   BP: (!) 164/73 (!) 160/67 (!) 160/67 (!) 173/74   Pulse: 65 56 55 64   Resp: 21 20 14 16   Temp:   98.3 °F (36.8 °C)    TempSrc:   Oral    SpO2: 95% 94% 93% 93%

## 2017-12-27 NOTE — ED NOTES
Bed: 03  Expected date:   Expected time:   Means of arrival:   Comments:  [id number] Fernanda Kang RN  12/27/17 1400

## 2018-01-08 DIAGNOSIS — F41.9 ANXIETY: ICD-10-CM

## 2018-01-09 RX ORDER — LORAZEPAM 0.5 MG/1
TABLET ORAL
Qty: 90 TABLET | Refills: 0 | Status: SHIPPED | OUTPATIENT
Start: 2018-01-09 | End: 2018-02-08

## 2018-01-16 ENCOUNTER — HOSPITAL ENCOUNTER (OUTPATIENT)
Age: 78
Discharge: HOME | End: 2018-01-16
Payer: MEDICARE

## 2018-01-16 ENCOUNTER — HOSPITAL ENCOUNTER (OUTPATIENT)
Dept: GENERAL RADIOLOGY | Age: 78
Discharge: HOME | End: 2018-01-16
Payer: MEDICARE

## 2018-01-16 DIAGNOSIS — J44.9 CHRONIC OBSTRUCTIVE PULMONARY DISEASE, UNSPECIFIED COPD TYPE (HCC): ICD-10-CM

## 2018-01-16 PROCEDURE — 71046 X-RAY EXAM CHEST 2 VIEWS: CPT

## 2018-01-29 ENCOUNTER — HOSPITAL ENCOUNTER (OUTPATIENT)
Dept: NUCLEAR MEDICINE | Age: 78
Discharge: HOME | End: 2018-01-29
Payer: MEDICARE

## 2018-01-29 DIAGNOSIS — R91.8 PULMONARY NODULES: ICD-10-CM

## 2018-01-29 PROCEDURE — 78815 PET IMAGE W/CT SKULL-THIGH: CPT

## 2018-01-29 PROCEDURE — 3430000000 HC RX DIAGNOSTIC RADIOPHARMACEUTICAL: Performed by: INTERNAL MEDICINE

## 2018-01-29 PROCEDURE — A9552 F18 FDG: HCPCS | Performed by: INTERNAL MEDICINE

## 2018-01-29 RX ADMIN — FLUDEOXYGLUCOSE F 18 17.15 MILLICURIE: 200 INJECTION, SOLUTION INTRAVENOUS at 15:00

## 2018-01-30 RX ORDER — FLUDEOXYGLUCOSE F 18 200 MCI/ML
17.15 INJECTION, SOLUTION INTRAVENOUS
Status: COMPLETED | OUTPATIENT
Start: 2018-01-30 | End: 2018-01-29

## 2018-02-18 DIAGNOSIS — F41.9 ANXIETY: ICD-10-CM

## 2018-02-19 RX ORDER — LORAZEPAM 0.5 MG/1
TABLET ORAL
Qty: 90 TABLET | Refills: 0 | Status: SHIPPED | OUTPATIENT
Start: 2018-02-19 | End: 2018-03-23 | Stop reason: SDUPTHER

## 2018-02-28 ENCOUNTER — HOSPITAL ENCOUNTER (OUTPATIENT)
Dept: OTHER | Age: 78
Discharge: HOME | End: 2018-02-28
Payer: MEDICARE

## 2018-02-28 VITALS
WEIGHT: 201.8 LBS | HEART RATE: 78 BPM | SYSTOLIC BLOOD PRESSURE: 138 MMHG | BODY MASS INDEX: 32.57 KG/M2 | OXYGEN SATURATION: 92 % | DIASTOLIC BLOOD PRESSURE: 70 MMHG | RESPIRATION RATE: 18 BRPM

## 2018-02-28 LAB
ANION GAP SERPL CALCULATED.3IONS-SCNC: 12 MMOL/L (ref 9–17)
BNP INTERPRETATION: ABNORMAL
BUN BLDV-MCNC: 18 MG/DL (ref 8–23)
CHLORIDE BLD-SCNC: 103 MMOL/L (ref 98–107)
CO2: 26 MMOL/L (ref 20–31)
CREAT SERPL-MCNC: 0.7 MG/DL (ref 0.5–0.9)
GFR AFRICAN AMERICAN: >60 ML/MIN
GFR NON-AFRICAN AMERICAN: >60 ML/MIN
GFR SERPL CREATININE-BSD FRML MDRD: NORMAL ML/MIN/{1.73_M2}
GFR SERPL CREATININE-BSD FRML MDRD: NORMAL ML/MIN/{1.73_M2}
POTASSIUM SERPL-SCNC: 4 MMOL/L (ref 3.7–5.3)
PRO-BNP: 4493 PG/ML
SODIUM BLD-SCNC: 141 MMOL/L (ref 135–144)

## 2018-02-28 PROCEDURE — 99212 OFFICE O/P EST SF 10 MIN: CPT

## 2018-02-28 PROCEDURE — 83880 ASSAY OF NATRIURETIC PEPTIDE: CPT

## 2018-02-28 PROCEDURE — 36415 COLL VENOUS BLD VENIPUNCTURE: CPT

## 2018-02-28 PROCEDURE — 82565 ASSAY OF CREATININE: CPT

## 2018-02-28 PROCEDURE — 84520 ASSAY OF UREA NITROGEN: CPT

## 2018-02-28 PROCEDURE — 80051 ELECTROLYTE PANEL: CPT

## 2018-02-28 RX ORDER — POTASSIUM CHLORIDE 750 MG/1
10 CAPSULE, EXTENDED RELEASE ORAL 2 TIMES DAILY
Status: ON HOLD | COMMUNITY
End: 2019-04-14 | Stop reason: HOSPADM

## 2018-02-28 RX ORDER — LOSARTAN POTASSIUM 25 MG/1
25 TABLET ORAL DAILY
COMMUNITY
End: 2019-03-06 | Stop reason: ALTCHOICE

## 2018-02-28 ASSESSMENT — EJECTION FRACTION
EF_SOURCE: 2D ECHO
EF_VALUE: 50-55%

## 2018-02-28 NOTE — PROGRESS NOTES
Oriented to CHF Clinic. Weighs daily. Watches diet. O2 on continuously. \"breathing better today\" Uses wheelchair with distance. Verbally reviewed importance of medication compliance with patient; patient verbalized understanding. Discussed 2000mg/day sodium restricted diet; patient verbalized understanding. Moderate daily exercise encouraged as tolerated. Discussed rest breaks as needed; patient verbalized understanding. Patient instructed to weigh self at the same time of each day, using same clothes and same scale; reinforced teaching to monitor for 3-5 lb weight increase over 1-2 days, and to notify the CHF clinic at [phone] or physician office if weight change noted. Patient verbalized understanding. Risks of smoking discussed with the patient if applicable; patient strongly discouraged to smoke. Patient verbalized understanding. Signs and symptoms of CHF discussed with patient, such as feeling more tired than normal, feeling short of breath, coughing that increases when you lie down, sudden weight gain, swelling of your feet, legs or belly. Patient verbalized understanding to notify the CHF clinic at [phone] or physician office if these symptoms occur. Compliance with plan of care and further disease process causes discussed with patient, patient encouraged to keep all follow up appointments. Patient verbalized understanding. Verbally reviewed importance of medication compliance with patient; patient verbalized understanding. Discussed 2000mg/day sodium restricted diet; patient verbalized understanding. Moderate daily exercise encouraged as tolerated. Discussed rest breaks as needed; patient verbalized understanding.     Patient instructed to weigh self at the same time of each day, using same clothes and same scale; reinforced teaching to monitor for 3-5 lb weight increase over 1-2 days, and to notify the CHF clinic at [phone] or physician office if weight change noted. Patient verbalized understanding. Risks of smoking discussed with the patient if applicable; patient strongly discouraged to smoke. Patient verbalized understanding. Signs and symptoms of CHF discussed with patient, such as feeling more tired than normal, feeling short of breath, coughing that increases when you lie down, sudden weight gain, swelling of your feet, legs or belly. Patient verbalized understanding to notify the CHF clinic at [phone] or physician office if these symptoms occur. Compliance with plan of care and further disease process causes discussed with patient, patient encouraged to keep all follow up appointments. Patient verbalized understanding.

## 2018-03-14 ENCOUNTER — HOSPITAL ENCOUNTER (OUTPATIENT)
Dept: OTHER | Age: 78
Discharge: HOME | End: 2018-03-14
Payer: MEDICARE

## 2018-03-14 VITALS
BODY MASS INDEX: 32.44 KG/M2 | RESPIRATION RATE: 18 BRPM | SYSTOLIC BLOOD PRESSURE: 152 MMHG | DIASTOLIC BLOOD PRESSURE: 80 MMHG | HEART RATE: 72 BPM | OXYGEN SATURATION: 91 % | WEIGHT: 201 LBS

## 2018-03-14 LAB
ANION GAP SERPL CALCULATED.3IONS-SCNC: 14 MMOL/L (ref 9–17)
BUN BLDV-MCNC: 21 MG/DL (ref 8–23)
CHLORIDE BLD-SCNC: 102 MMOL/L (ref 98–107)
CO2: 23 MMOL/L (ref 20–31)
CREAT SERPL-MCNC: 0.67 MG/DL (ref 0.5–0.9)
GFR AFRICAN AMERICAN: >60 ML/MIN
GFR NON-AFRICAN AMERICAN: >60 ML/MIN
GFR SERPL CREATININE-BSD FRML MDRD: NORMAL ML/MIN/{1.73_M2}
GFR SERPL CREATININE-BSD FRML MDRD: NORMAL ML/MIN/{1.73_M2}
POTASSIUM SERPL-SCNC: 4.1 MMOL/L (ref 3.7–5.3)
SODIUM BLD-SCNC: 139 MMOL/L (ref 135–144)

## 2018-03-14 PROCEDURE — 99211 OFF/OP EST MAY X REQ PHY/QHP: CPT

## 2018-03-14 PROCEDURE — 84520 ASSAY OF UREA NITROGEN: CPT

## 2018-03-14 PROCEDURE — 82565 ASSAY OF CREATININE: CPT

## 2018-03-14 PROCEDURE — 80051 ELECTROLYTE PANEL: CPT

## 2018-03-14 PROCEDURE — 36415 COLL VENOUS BLD VENIPUNCTURE: CPT

## 2018-03-23 DIAGNOSIS — F41.9 ANXIETY: ICD-10-CM

## 2018-03-23 RX ORDER — LORAZEPAM 0.5 MG/1
TABLET ORAL
Qty: 90 TABLET | Refills: 0 | Status: SHIPPED | OUTPATIENT
Start: 2018-03-23 | End: 2018-05-01 | Stop reason: SDUPTHER

## 2018-03-28 ENCOUNTER — HOSPITAL ENCOUNTER (OUTPATIENT)
Age: 78
Discharge: HOME | End: 2018-03-28
Payer: MEDICARE

## 2018-03-28 ENCOUNTER — HOSPITAL ENCOUNTER (OUTPATIENT)
Dept: OTHER | Age: 78
Discharge: HOME | End: 2018-03-28
Payer: MEDICARE

## 2018-03-28 VITALS
WEIGHT: 201 LBS | HEIGHT: 66 IN | DIASTOLIC BLOOD PRESSURE: 72 MMHG | BODY MASS INDEX: 32.3 KG/M2 | OXYGEN SATURATION: 91 % | RESPIRATION RATE: 18 BRPM | HEART RATE: 74 BPM | SYSTOLIC BLOOD PRESSURE: 146 MMHG

## 2018-03-28 LAB
ANION GAP SERPL CALCULATED.3IONS-SCNC: 11 MMOL/L (ref 9–17)
BNP INTERPRETATION: ABNORMAL
BUN BLDV-MCNC: 17 MG/DL (ref 8–23)
CHLORIDE BLD-SCNC: 101 MMOL/L (ref 98–107)
CO2: 26 MMOL/L (ref 20–31)
CREAT SERPL-MCNC: 0.67 MG/DL (ref 0.5–0.9)
GFR AFRICAN AMERICAN: >60 ML/MIN
GFR NON-AFRICAN AMERICAN: >60 ML/MIN
GFR SERPL CREATININE-BSD FRML MDRD: NORMAL ML/MIN/{1.73_M2}
GFR SERPL CREATININE-BSD FRML MDRD: NORMAL ML/MIN/{1.73_M2}
HCT VFR BLD CALC: 32.9 % (ref 36–46)
HEMOGLOBIN: 9.6 G/DL (ref 12–16)
POTASSIUM SERPL-SCNC: 3.8 MMOL/L (ref 3.7–5.3)
PRO-BNP: 4146 PG/ML
SODIUM BLD-SCNC: 138 MMOL/L (ref 135–144)

## 2018-03-28 PROCEDURE — 82565 ASSAY OF CREATININE: CPT

## 2018-03-28 PROCEDURE — 84520 ASSAY OF UREA NITROGEN: CPT

## 2018-03-28 PROCEDURE — 36415 COLL VENOUS BLD VENIPUNCTURE: CPT

## 2018-03-28 PROCEDURE — 99211 OFF/OP EST MAY X REQ PHY/QHP: CPT

## 2018-03-28 PROCEDURE — 85018 HEMOGLOBIN: CPT

## 2018-03-28 PROCEDURE — 85014 HEMATOCRIT: CPT

## 2018-03-28 PROCEDURE — 80051 ELECTROLYTE PANEL: CPT

## 2018-03-28 PROCEDURE — 83880 ASSAY OF NATRIURETIC PEPTIDE: CPT

## 2018-03-28 NOTE — PROGRESS NOTES
Patient doing well and maintaining her condition. Patient uses her diuretic as needed for fluid control. Patient sees Dr. Florence Turner next week. Verbally reviewed importance of medication compliance with patient; patient verbalized understanding. Discussed 2000mg/day sodium restricted diet; patient verbalized understanding. Moderate daily exercise encouraged as tolerated. Discussed rest breaks as needed; patient verbalized understanding. Patient instructed to weigh self at the same time of each day, using same clothes and same scale; reinforced teaching to monitor for 3-5 lb weight increase over 1-2 days, and to notify the CHF clinic at [phone] or physician office if weight change noted. Patient verbalized understanding. Risks of smoking discussed with the patient if applicable; patient strongly discouraged to smoke. Patient verbalized understanding. Signs and symptoms of CHF discussed with patient, such as feeling more tired than normal, feeling short of breath, coughing that increases when you lie down, sudden weight gain, swelling of your feet, legs or belly. Patient verbalized understanding to notify the CHF clinic at [phone] or physician office if these symptoms occur. Compliance with plan of care and further disease process causes discussed with patient, patient encouraged to keep all follow up appointments. Patient verbalized understanding.

## 2018-04-18 ENCOUNTER — HOSPITAL ENCOUNTER (OUTPATIENT)
Dept: OTHER | Age: 78
Discharge: HOME | End: 2018-04-18
Payer: MEDICARE

## 2018-04-18 VITALS
SYSTOLIC BLOOD PRESSURE: 118 MMHG | DIASTOLIC BLOOD PRESSURE: 60 MMHG | RESPIRATION RATE: 22 BRPM | BODY MASS INDEX: 32.31 KG/M2 | OXYGEN SATURATION: 97 % | HEART RATE: 70 BPM | WEIGHT: 200.2 LBS

## 2018-04-18 PROCEDURE — 99211 OFF/OP EST MAY X REQ PHY/QHP: CPT

## 2018-05-01 DIAGNOSIS — F41.9 ANXIETY: ICD-10-CM

## 2018-05-01 RX ORDER — LORAZEPAM 0.5 MG/1
TABLET ORAL
Qty: 90 TABLET | Refills: 0 | Status: SHIPPED | OUTPATIENT
Start: 2018-05-01 | End: 2018-06-14 | Stop reason: SDUPTHER

## 2018-05-16 ENCOUNTER — CARE COORDINATION (OUTPATIENT)
Dept: CARE COORDINATION | Age: 78
End: 2018-05-16

## 2018-05-21 ENCOUNTER — HOSPITAL ENCOUNTER (OUTPATIENT)
Dept: OTHER | Age: 78
Discharge: HOME | End: 2018-05-21
Payer: MEDICARE

## 2018-05-21 ENCOUNTER — HOSPITAL ENCOUNTER (OUTPATIENT)
Age: 78
Discharge: HOME | End: 2018-05-21
Payer: MEDICARE

## 2018-05-21 VITALS
WEIGHT: 203 LBS | OXYGEN SATURATION: 91 % | BODY MASS INDEX: 32.62 KG/M2 | HEIGHT: 66 IN | HEART RATE: 70 BPM | SYSTOLIC BLOOD PRESSURE: 118 MMHG | RESPIRATION RATE: 22 BRPM | DIASTOLIC BLOOD PRESSURE: 70 MMHG

## 2018-05-21 DIAGNOSIS — I10 ESSENTIAL HYPERTENSION: ICD-10-CM

## 2018-05-21 LAB
ANION GAP SERPL CALCULATED.3IONS-SCNC: 12 MMOL/L (ref 9–17)
BNP INTERPRETATION: ABNORMAL
BUN BLDV-MCNC: 23 MG/DL (ref 8–23)
CHLORIDE BLD-SCNC: 104 MMOL/L (ref 98–107)
CO2: 25 MMOL/L (ref 20–31)
CREAT SERPL-MCNC: 0.72 MG/DL (ref 0.5–0.9)
GFR AFRICAN AMERICAN: >60 ML/MIN
GFR NON-AFRICAN AMERICAN: >60 ML/MIN
GFR SERPL CREATININE-BSD FRML MDRD: NORMAL ML/MIN/{1.73_M2}
GFR SERPL CREATININE-BSD FRML MDRD: NORMAL ML/MIN/{1.73_M2}
HCT VFR BLD CALC: 40.7 % (ref 36–46)
HEMOGLOBIN: 12 G/DL (ref 12–16)
POTASSIUM SERPL-SCNC: 3.8 MMOL/L (ref 3.7–5.3)
PRO-BNP: 4799 PG/ML
SODIUM BLD-SCNC: 141 MMOL/L (ref 135–144)

## 2018-05-21 PROCEDURE — 99211 OFF/OP EST MAY X REQ PHY/QHP: CPT

## 2018-05-21 PROCEDURE — 85018 HEMOGLOBIN: CPT

## 2018-05-21 PROCEDURE — 84520 ASSAY OF UREA NITROGEN: CPT

## 2018-05-21 PROCEDURE — 80051 ELECTROLYTE PANEL: CPT

## 2018-05-21 PROCEDURE — 82565 ASSAY OF CREATININE: CPT

## 2018-05-21 PROCEDURE — 36415 COLL VENOUS BLD VENIPUNCTURE: CPT

## 2018-05-21 PROCEDURE — 83880 ASSAY OF NATRIURETIC PEPTIDE: CPT

## 2018-05-21 PROCEDURE — 85014 HEMATOCRIT: CPT

## 2018-06-04 ENCOUNTER — CARE COORDINATION (OUTPATIENT)
Dept: CARE COORDINATION | Age: 78
End: 2018-06-04

## 2018-06-05 ENCOUNTER — CARE COORDINATION (OUTPATIENT)
Dept: CARE COORDINATION | Age: 78
End: 2018-06-05

## 2018-06-07 ENCOUNTER — HOSPITAL ENCOUNTER (OUTPATIENT)
Dept: OTHER | Age: 78
Discharge: HOME | End: 2018-06-07
Payer: MEDICARE

## 2018-06-07 VITALS
BODY MASS INDEX: 32.21 KG/M2 | HEIGHT: 66 IN | RESPIRATION RATE: 18 BRPM | HEART RATE: 70 BPM | WEIGHT: 200.4 LBS | OXYGEN SATURATION: 90 % | SYSTOLIC BLOOD PRESSURE: 136 MMHG | DIASTOLIC BLOOD PRESSURE: 78 MMHG

## 2018-06-07 LAB
ANION GAP SERPL CALCULATED.3IONS-SCNC: 12 MMOL/L (ref 9–17)
BUN BLDV-MCNC: 24 MG/DL (ref 8–23)
CHLORIDE BLD-SCNC: 102 MMOL/L (ref 98–107)
CO2: 25 MMOL/L (ref 20–31)
CREAT SERPL-MCNC: 0.9 MG/DL (ref 0.5–0.9)
GFR AFRICAN AMERICAN: >60 ML/MIN
GFR NON-AFRICAN AMERICAN: >60 ML/MIN
GFR SERPL CREATININE-BSD FRML MDRD: ABNORMAL ML/MIN/{1.73_M2}
GFR SERPL CREATININE-BSD FRML MDRD: ABNORMAL ML/MIN/{1.73_M2}
POTASSIUM SERPL-SCNC: 4 MMOL/L (ref 3.7–5.3)
SODIUM BLD-SCNC: 139 MMOL/L (ref 135–144)

## 2018-06-07 PROCEDURE — 84520 ASSAY OF UREA NITROGEN: CPT

## 2018-06-07 PROCEDURE — 99211 OFF/OP EST MAY X REQ PHY/QHP: CPT

## 2018-06-07 PROCEDURE — 82565 ASSAY OF CREATININE: CPT

## 2018-06-07 PROCEDURE — 36415 COLL VENOUS BLD VENIPUNCTURE: CPT

## 2018-06-07 PROCEDURE — 80051 ELECTROLYTE PANEL: CPT

## 2018-06-14 ENCOUNTER — CARE COORDINATION (OUTPATIENT)
Dept: CARE COORDINATION | Age: 78
End: 2018-06-14

## 2018-06-14 DIAGNOSIS — F41.9 ANXIETY: ICD-10-CM

## 2018-06-14 RX ORDER — LORAZEPAM 0.5 MG/1
TABLET ORAL
Qty: 90 TABLET | Refills: 0 | Status: SHIPPED | OUTPATIENT
Start: 2018-06-14 | End: 2018-07-16 | Stop reason: SDUPTHER

## 2018-07-03 ENCOUNTER — TELEPHONE (OUTPATIENT)
Dept: UROLOGY | Age: 78
End: 2018-07-03

## 2018-07-03 ENCOUNTER — OFFICE VISIT (OUTPATIENT)
Dept: PULMONOLOGY | Age: 78
End: 2018-07-03
Payer: MEDICARE

## 2018-07-03 VITALS
DIASTOLIC BLOOD PRESSURE: 77 MMHG | HEART RATE: 67 BPM | BODY MASS INDEX: 32.14 KG/M2 | WEIGHT: 200 LBS | HEIGHT: 66 IN | SYSTOLIC BLOOD PRESSURE: 137 MMHG | OXYGEN SATURATION: 91 % | RESPIRATION RATE: 16 BRPM

## 2018-07-03 DIAGNOSIS — I27.82 OTHER CHRONIC PULMONARY EMBOLISM WITH ACUTE COR PULMONALE (HCC): ICD-10-CM

## 2018-07-03 DIAGNOSIS — J84.112 UIP (USUAL INTERSTITIAL PNEUMONITIS) (HCC): ICD-10-CM

## 2018-07-03 DIAGNOSIS — J84.9 INTERSTITIAL LUNG DISEASE (HCC): ICD-10-CM

## 2018-07-03 DIAGNOSIS — I50.32 CHRONIC DIASTOLIC CHF (CONGESTIVE HEART FAILURE) (HCC): ICD-10-CM

## 2018-07-03 DIAGNOSIS — J43.2 CENTRILOBULAR EMPHYSEMA (HCC): ICD-10-CM

## 2018-07-03 DIAGNOSIS — I27.20 PULMONARY HYPERTENSION (HCC): ICD-10-CM

## 2018-07-03 DIAGNOSIS — I26.09 OTHER CHRONIC PULMONARY EMBOLISM WITH ACUTE COR PULMONALE (HCC): ICD-10-CM

## 2018-07-03 DIAGNOSIS — J84.10 PULMONARY FIBROSIS (HCC): Primary | ICD-10-CM

## 2018-07-03 PROBLEM — I26.99 PULMONARY EMBOLUS (HCC): Status: ACTIVE | Noted: 2018-07-03

## 2018-07-03 PROCEDURE — G8926 SPIRO NO PERF OR DOC: HCPCS | Performed by: INTERNAL MEDICINE

## 2018-07-03 PROCEDURE — 3023F SPIROM DOC REV: CPT | Performed by: INTERNAL MEDICINE

## 2018-07-03 PROCEDURE — G8417 CALC BMI ABV UP PARAM F/U: HCPCS | Performed by: INTERNAL MEDICINE

## 2018-07-03 PROCEDURE — 1090F PRES/ABSN URINE INCON ASSESS: CPT | Performed by: INTERNAL MEDICINE

## 2018-07-03 PROCEDURE — G8427 DOCREV CUR MEDS BY ELIG CLIN: HCPCS | Performed by: INTERNAL MEDICINE

## 2018-07-03 PROCEDURE — 99205 OFFICE O/P NEW HI 60 MIN: CPT | Performed by: INTERNAL MEDICINE

## 2018-07-03 NOTE — PROGRESS NOTES
PULMONARY OP CONSULTATION        REFERRED BY: Laurence Galaviz MD    REASON FOR CONSULTATION: Dyspnea due to pulmonary fibrosis    HISTORY OF PRESENT ILLNESS:    Santos Arias is a [de-identified]y.o. year old female here for evaluation of dyspnea due to pulmonary fibrosis. Patient has been evaluated by different pulmonologist in [facility] and also at [facility] Recently, and was informed there was not much to offer. Patient has been on Esbriet, but was unable to tolerate and hence came off of it since March 2018. Patient also has pulmonary artery hypertension and was recommended some medications for it, but was told at [facility] that they will not be helpful in view of the underlying pathology such as heart failure, pulmonary fibrosis and emphysema causing her pulmonary artery hypertension. Patient is on supplemental oxygen at 3 L/m via nasal cannula continuously and she was wondering if that could be increased during walking. I have informed her she could go up by 1-2 L while she was walking using her pulse oximeter as a guide, keeping the saturation 89-90%. She is also on anticoagulant since 2016 because of atrial fibrillation. She has class 3-4 dyspnea. Has cough that is mostly dry but at times productive of mucoid sputum. No hemoptysis. Not much orthopnea or PND or increasing pedal edema. Has intermittent retrosternal chest pain. She follows up with cardiology      PAST MEDICAL HISTORY:       Diagnosis Date    Anxiety     Atrial fibrillation ([address]     Orantes's esophagus     GERD (gastroesophageal reflux disease)     Heart disease     triple bypass    Hypertension     Lipids abnormal     Pulmonary fibrosis ([address]        SURGICAL HISTORY:    Past Surgical History:   Procedure Laterality Date    BREAST BIOPSY  1987    left benign    BYPASS GRAFT N/A     3 cardiac bypasses. Most recent 2010.    [address] [facility]; 01\2010    triple bypass (2)    CARDIOVERSION      10/25/16    CHOLECYSTECTOMY  1998       ALLERGIES:    Allergies   Allergen Reactions    Penicillins Rash       MEDICATIONS:   Current Outpatient Prescriptions   Medication Sig Dispense Refill    LORazepam (ATIVAN) 0.5 MG tablet TAKE 1 TABLET BY MOUTH THREE TIMES DAILY AS NEEDED FOR ANXIETY 90 tablet 0    Misc. Devices (ROLLATOR) MISC 1 each by Does not apply route as needed (use for ambulation) 1 each 0    Mometasone Furo-Formoterol Fum (DULERA IN) Inhale into the lungs      OXYGEN Inhale 2 L into the lungs continuous       omeprazole (PRILOSEC) 40 MG delayed release capsule TAKE 1 CAPSULE EVERY DAY 90 capsule 1    citalopram (CELEXA) 20 MG tablet Take 1 tablet by mouth daily 30 tablet 3    losartan (COZAAR) 25 MG tablet Take 25 mg by mouth daily      potassium chloride (MICRO-K) 10 MEQ extended release capsule Take 10 mEq by mouth daily      furosemide (LASIX) 20 MG tablet Take 40 mg by mouth daily       Pirfenidone 267 MG CAPS Take 3 capsules by mouth 3 times daily      nitroGLYCERIN (NITROSTAT) 0.4 MG SL tablet Place 0.4 mg under the tongue every 5 minutes as needed for Chest pain up to max of 3 total doses. If no relief after 1 dose, call 911.  aspirin 81 MG tablet Take 81 mg by mouth daily      apixaban (ELIQUIS) 5 MG TABS tablet Take by mouth 2 times daily      Multiple Vitamins-Minerals (THERAPEUTIC MULTIVITAMIN-MINERALS) tablet Take 1 tablet by mouth daily      metoprolol (TOPROL-XL) 100 MG XL tablet Take 100 mg by mouth daily       nitroGLYCERIN (NITROLINGUAL) 0.4 MG/SPRAY spray Place 1 spray under the tongue every 5 minutes as needed.  atorvastatin (LIPITOR) 40 MG tablet Take 40 mg by mouth daily. No current facility-administered medications for this visit. FAMILY HISTORY: family history includes Cancer in her sister and sister;  Heart Disease in her brother, father, and sister; High Blood Pressure in her sister; High Cholesterol in her sister; Liver Disease in her mother; Other in her sister; Stroke in her maternal grandmother and sister. SOCIAL AND OCCUPATIONAL HEALTH:  The patient is a Past smoker of about 2 packs per day but quit smoking about 30 years ago. There  is not history of TB or TB exposure. There is not asbestos or silica dust exposure. The patient reports does not have coal, foundry, quarry or [employer] exposure. Travel history reveals no significant history of risk factors for pulm disease. There is not  history of recreational or IV drug use. The patient does not pets, dogs, cats turtles or exotic birds.         Review of Systems:  Review of Systems -   General ROS: Completed and except as mentioned above were negative   Psychological ROS:  Completed and except as mentioned above were negative  Ophthalmic ROS:  Completed and except as mentioned above were negative  ENT ROS:  Completed and except as mentioned above were negative  Allergy and Immunology ROS:  Completed and except as mentioned above were negative  Hematological and Lymphatic ROS:  Completed and except as mentioned above were negative  Endocrine ROS: Completed and except as mentioned above were negative  Breast ROS:  Completed and except as mentioned above were negative  Respiratory ROS:  Completed and except as mentioned above were negative  Cardiovascular ROS:  Completed and except as mentioned above were negative  Gastrointestinal ROS: Completed and except as mentioned above were negative  Genito-Urinary ROS:  Completed and except as mentioned above were negative  Musculoskeletal ROS:  Completed and except as mentioned above were negative  Neurological ROS:  Completed and except as mentioned above were negative  Dermatological ROS:  Completed and except as mentioned above were negative        PHYSICAL EXAMINATION:  Vitals:    07/03/18 1446   BP: 137/77   Pulse: 67   Resp: 16   SpO2: 91%   Weight: 200 lb (90.7 kg)   Height: 5' 6\" (1.676 m)     PHYSICAL EXAMINATION:  Vitals:    07/03/18 1446

## 2018-07-03 NOTE — PROGRESS NOTES
[facility]   [address] [facility] [id number]  [address] [facility] [address]  Dept: [phone]  Dept Fax: [phone]      7/3/18    Patient: Rajesh Delgadillo  YOB: 1940    Dear Zaheer Winchester MD,    I had the pleasure of seeing one of your patients, Jessica Cheung today in the office today. Please find attached my note with the assessment and plan of care. Thank you for allowing me to participate in the care of this patient. I will keep you updated on this patient's follow up and I look forward to serving you and your patients again in the future.     Veronica Amaral MD  7/3/2018 4:41 PM

## 2018-07-10 ENCOUNTER — CARE COORDINATION (OUTPATIENT)
Dept: CARE COORDINATION | Age: 78
End: 2018-07-10

## 2018-07-10 ENCOUNTER — HOSPITAL ENCOUNTER (OUTPATIENT)
Dept: OTHER | Age: 78
Discharge: HOME | End: 2018-07-10
Payer: MEDICARE

## 2018-07-10 VITALS
RESPIRATION RATE: 20 BRPM | BODY MASS INDEX: 31.66 KG/M2 | HEIGHT: 66 IN | OXYGEN SATURATION: 92 % | WEIGHT: 197 LBS | SYSTOLIC BLOOD PRESSURE: 122 MMHG | DIASTOLIC BLOOD PRESSURE: 72 MMHG | HEART RATE: 64 BPM

## 2018-07-10 LAB
ANION GAP SERPL CALCULATED.3IONS-SCNC: 13 MMOL/L (ref 9–17)
BUN BLDV-MCNC: 19 MG/DL (ref 8–23)
CHLORIDE BLD-SCNC: 101 MMOL/L (ref 98–107)
CO2: 26 MMOL/L (ref 20–31)
CREAT SERPL-MCNC: 0.72 MG/DL (ref 0.5–0.9)
GFR AFRICAN AMERICAN: >60 ML/MIN
GFR NON-AFRICAN AMERICAN: >60 ML/MIN
GFR SERPL CREATININE-BSD FRML MDRD: NORMAL ML/MIN/{1.73_M2}
GFR SERPL CREATININE-BSD FRML MDRD: NORMAL ML/MIN/{1.73_M2}
POTASSIUM SERPL-SCNC: 3.5 MMOL/L (ref 3.7–5.3)
SODIUM BLD-SCNC: 140 MMOL/L (ref 135–144)

## 2018-07-10 PROCEDURE — 36415 COLL VENOUS BLD VENIPUNCTURE: CPT

## 2018-07-10 PROCEDURE — 99211 OFF/OP EST MAY X REQ PHY/QHP: CPT

## 2018-07-10 PROCEDURE — 84520 ASSAY OF UREA NITROGEN: CPT

## 2018-07-10 PROCEDURE — 82565 ASSAY OF CREATININE: CPT

## 2018-07-10 PROCEDURE — 80051 ELECTROLYTE PANEL: CPT

## 2018-07-10 NOTE — CARE COORDINATION
mouth daily    Historical Provider, MD   apixaban (ELIQUIS) 5 MG TABS tablet Take by mouth 2 times daily    Historical Provider, MD   Multiple Vitamins-Minerals (THERAPEUTIC MULTIVITAMIN-MINERALS) tablet Take 1 tablet by mouth daily    Historical Provider, MD   metoprolol (TOPROL-XL) 100 MG XL tablet Take 100 mg by mouth daily     Historical Provider, MD   nitroGLYCERIN (NITROLINGUAL) 0.4 MG/SPRAY spray Place 1 spray under the tongue every 5 minutes as needed. Historical Provider, MD   atorvastatin (LIPITOR) 40 MG tablet Take 40 mg by mouth daily.       Historical Provider, MD       Future Appointments  Date Time Provider Carolann Norris   7/10/2018 10:30 AM [facility] CHF CLINIC RM 3 STCZ CHFCLIN None   7/12/2018 1:45 PM ST ECHO RM 1 STCZ ECHO [facility]   7/12/2018 2:30 PM STC NUC MED  STCZ NUC MED [facility] Radiolog   7/12/2018 3:00 PM [facility] CT RM 2 FAST SCANNER STCZ CT SCAN [facility] Radiolog   8/10/2018 3:00 PM SCHEDULE, [facility] RESPIRATORY SPEC Resp Spec [facility]   8/10/2018 3:15 PM Radha Factor, DO Resp Spec [facility]   10/4/2018 3:00 PM Issac Banerjee, [address] FM [facility]

## 2018-07-10 NOTE — CARE COORDINATION
TC to [place]. She prefers to have the rollator at this time. She states she would like to know where she can purchase a travel light W/C. Plan:  Fax order and chart documentation to [address] [place] [address]. Call area DME suppliers to price a travel light W/C.

## 2018-07-12 ENCOUNTER — HOSPITAL ENCOUNTER (OUTPATIENT)
Dept: NON INVASIVE DIAGNOSTICS | Age: 78
Discharge: HOME | End: 2018-07-12
Payer: MEDICARE

## 2018-07-12 ENCOUNTER — HOSPITAL ENCOUNTER (OUTPATIENT)
Dept: CT IMAGING | Age: 78
Discharge: HOME | End: 2018-07-14
Payer: MEDICARE

## 2018-07-12 ENCOUNTER — TELEPHONE (OUTPATIENT)
Dept: PULMONOLOGY | Age: 78
End: 2018-07-12

## 2018-07-12 ENCOUNTER — HOSPITAL ENCOUNTER (OUTPATIENT)
Dept: NUCLEAR MEDICINE | Age: 78
Discharge: HOME | End: 2018-07-14
Payer: MEDICARE

## 2018-07-12 ENCOUNTER — HOSPITAL ENCOUNTER (OUTPATIENT)
Dept: GENERAL RADIOLOGY | Age: 78
Discharge: HOME | End: 2018-07-14
Payer: MEDICARE

## 2018-07-12 DIAGNOSIS — I27.82 OTHER CHRONIC PULMONARY EMBOLISM WITH ACUTE COR PULMONALE (HCC): Primary | ICD-10-CM

## 2018-07-12 DIAGNOSIS — J84.112 UIP (USUAL INTERSTITIAL PNEUMONITIS) (HCC): ICD-10-CM

## 2018-07-12 DIAGNOSIS — I26.09 OTHER CHRONIC PULMONARY EMBOLISM WITH ACUTE COR PULMONALE (HCC): ICD-10-CM

## 2018-07-12 DIAGNOSIS — I27.82 OTHER CHRONIC PULMONARY EMBOLISM WITH ACUTE COR PULMONALE (HCC): ICD-10-CM

## 2018-07-12 DIAGNOSIS — I27.20 PULMONARY HYPERTENSION (HCC): ICD-10-CM

## 2018-07-12 DIAGNOSIS — I26.09 OTHER CHRONIC PULMONARY EMBOLISM WITH ACUTE COR PULMONALE (HCC): Primary | ICD-10-CM

## 2018-07-12 LAB
LV EF: 38 %
LVEF MODALITY: NORMAL

## 2018-07-12 PROCEDURE — A9540 TC99M MAA: HCPCS | Performed by: INTERNAL MEDICINE

## 2018-07-12 PROCEDURE — 71250 CT THORAX DX C-: CPT

## 2018-07-12 PROCEDURE — 78582 LUNG VENTILAT&PERFUS IMAGING: CPT

## 2018-07-12 PROCEDURE — 71046 X-RAY EXAM CHEST 2 VIEWS: CPT

## 2018-07-12 PROCEDURE — 93306 TTE W/DOPPLER COMPLETE: CPT

## 2018-07-12 PROCEDURE — 3430000000 HC RX DIAGNOSTIC RADIOPHARMACEUTICAL: Performed by: INTERNAL MEDICINE

## 2018-07-12 PROCEDURE — A9539 TC99M PENTETATE: HCPCS | Performed by: INTERNAL MEDICINE

## 2018-07-12 PROCEDURE — 2580000003 HC RX 258: Performed by: INTERNAL MEDICINE

## 2018-07-12 RX ORDER — KIT FOR THE PREPARATION OF TECHNETIUM TC 99M PENTETATE 20 MG/1
40 INJECTION, POWDER, LYOPHILIZED, FOR SOLUTION INTRAVENOUS; RESPIRATORY (INHALATION)
Status: COMPLETED | OUTPATIENT
Start: 2018-07-12 | End: 2018-07-12

## 2018-07-12 RX ORDER — SODIUM CHLORIDE 0.9 % (FLUSH) 0.9 %
10 SYRINGE (ML) INJECTION PRN
Status: DISCONTINUED | OUTPATIENT
Start: 2018-07-12 | End: 2018-07-15 | Stop reason: HOSPADM

## 2018-07-12 RX ADMIN — KIT FOR THE PREPARATION OF TECHNETIUM TC 99M PENTETATE 47.3 MILLICURIE: 20 INJECTION, POWDER, LYOPHILIZED, FOR SOLUTION INTRAVENOUS; RESPIRATORY (INHALATION) at 14:39

## 2018-07-12 RX ADMIN — Medication 10 ML: at 15:00

## 2018-07-12 RX ADMIN — Medication 8 MILLICURIE: at 15:00

## 2018-07-12 RX ADMIN — Medication 10 ML: at 14:59

## 2018-07-12 NOTE — PROGRESS NOTES
IV started by RT Kaci ([facility]) for purpose of bubble study during echo exam. The IV was not removed in the echo department, as PT was having nuclear and CT testing to follow. Pablo FofanaClaytonRT Saniya and RT Kaci notified.

## 2018-07-12 NOTE — PROGRESS NOTES
20 ga IV started in left AC per Soledad Moreland, RT from CT for bubble test in echo. Writer injected 10 ml of agitated 0.9 NS for bubble study.

## 2018-07-13 ENCOUNTER — TELEPHONE (OUTPATIENT)
Dept: PULMONOLOGY | Age: 78
End: 2018-07-13

## 2018-07-16 DIAGNOSIS — F41.9 ANXIETY: ICD-10-CM

## 2018-07-17 RX ORDER — LORAZEPAM 0.5 MG/1
TABLET ORAL
Qty: 90 TABLET | Refills: 0 | Status: SHIPPED | OUTPATIENT
Start: 2018-07-17 | End: 2018-08-23 | Stop reason: SDUPTHER

## 2018-07-17 NOTE — TELEPHONE ENCOUNTER
Next Visit Date:  Future Appointments  Date Time Provider Carolann Norris   7/24/2018 11:00 AM [facility] CHF CLINIC RM 3 STCZ CHFCLIN None   8/10/2018 3:00 PM SCHEDULE, P RESPIRATORY SPEC Resp Spec [facility]   8/10/2018 3:15 PM Clois Beltran, DO Resp Spec [facility]   10/4/2018 3:00 PM Doron Chowdhury MD [address] Maintenance   Topic Date Due    DTaP/Tdap/Td vaccine (1 - Tdap) 06/04/2019 (Originally 3/31/1959)    Shingles Vaccine (1 of 2 - 2 Dose Series) 06/04/2019 (Originally 3/31/1988)    Flu vaccine (1) 09/01/2018    Pneumococcal low/med risk (2 of 2 - PPSV23) 10/01/2018    Potassium monitoring  07/10/2019    Creatinine monitoring  07/10/2019    DEXA (modify frequency per FRAX score)  Completed       Hemoglobin A1C (%)   Date Value   05/25/2016 6.1   10/02/2015 6.1 (H)             ( goal A1C is < 7)   Microalb/Crt.  Ratio (mcg/mg creat)   Date Value   10/02/2015 14     LDL Cholesterol (mg/dL)   Date Value   08/02/2017 91   10/02/2015 118       (goal LDL is <100)   AST (U/L)   Date Value   12/23/2017 33 (H)     ALT (U/L)   Date Value   12/23/2017 35 (H)     BUN (mg/dL)   Date Value   07/10/2018 19     BP Readings from Last 3 Encounters:   07/10/18 122/72   07/03/18 137/77   06/07/18 136/78          (goal 120/80)    All Future Testing planned in [facility]  Lab Frequency Next Occurrence   6 MIN WALK TEST Once 07/04/2018               Patient Active Problem List:     GERD (gastroesophageal reflux disease)     Anxiety     Lipids abnormal     Hypertension     Heart disease     Atrial fibrillation (HCC)     Pulmonary fibrosis (HCC)     Pneumonia     Mixed hyperlipidemia     Acute cystitis without hematuria     Interstitial lung disease (HCC)     Centrilobular emphysema (HCC)     Pulmonary hypertension     Chronic diastolic CHF (congestive heart failure) ([address]     Pulmonary embolus ([address]

## 2018-07-19 ENCOUNTER — CARE COORDINATION (OUTPATIENT)
Dept: CARE COORDINATION | Age: 78
End: 2018-07-19

## 2018-07-19 NOTE — TELEPHONE ENCOUNTER
I could reach the pt today and let her about her cxr, Ct chest, v/q scan and echo findings.  rec that she call dr. Mallory Fuentes and see him soon rather than august 10. ty

## 2018-07-24 ENCOUNTER — HOSPITAL ENCOUNTER (OUTPATIENT)
Dept: OTHER | Age: 78
Discharge: HOME | End: 2018-07-24
Payer: MEDICARE

## 2018-07-24 VITALS
HEART RATE: 68 BPM | DIASTOLIC BLOOD PRESSURE: 70 MMHG | OXYGEN SATURATION: 95 % | HEIGHT: 66 IN | BODY MASS INDEX: 31.98 KG/M2 | SYSTOLIC BLOOD PRESSURE: 122 MMHG | WEIGHT: 199 LBS | RESPIRATION RATE: 20 BRPM

## 2018-07-24 LAB
ANION GAP SERPL CALCULATED.3IONS-SCNC: 15 MMOL/L (ref 9–17)
BNP INTERPRETATION: ABNORMAL
BUN BLDV-MCNC: 23 MG/DL (ref 8–23)
CHLORIDE BLD-SCNC: 100 MMOL/L (ref 98–107)
CO2: 22 MMOL/L (ref 20–31)
CREAT SERPL-MCNC: 0.77 MG/DL (ref 0.5–0.9)
GFR AFRICAN AMERICAN: >60 ML/MIN
GFR NON-AFRICAN AMERICAN: >60 ML/MIN
GFR SERPL CREATININE-BSD FRML MDRD: NORMAL ML/MIN/{1.73_M2}
GFR SERPL CREATININE-BSD FRML MDRD: NORMAL ML/MIN/{1.73_M2}
POTASSIUM SERPL-SCNC: 3.9 MMOL/L (ref 3.7–5.3)
PRO-BNP: 4169 PG/ML
SODIUM BLD-SCNC: 137 MMOL/L (ref 135–144)

## 2018-07-24 PROCEDURE — 83880 ASSAY OF NATRIURETIC PEPTIDE: CPT

## 2018-07-24 PROCEDURE — 36415 COLL VENOUS BLD VENIPUNCTURE: CPT

## 2018-07-24 PROCEDURE — 80051 ELECTROLYTE PANEL: CPT

## 2018-07-24 PROCEDURE — 99211 OFF/OP EST MAY X REQ PHY/QHP: CPT

## 2018-07-24 PROCEDURE — 84520 ASSAY OF UREA NITROGEN: CPT

## 2018-07-24 PROCEDURE — 82565 ASSAY OF CREATININE: CPT

## 2018-07-31 ENCOUNTER — CARE COORDINATION (OUTPATIENT)
Dept: CARE COORDINATION | Age: 78
End: 2018-07-31

## 2018-08-08 PROBLEM — R91.1 LUNG NODULE, SOLITARY: Status: ACTIVE | Noted: 2017-09-29

## 2018-08-09 ENCOUNTER — CARE COORDINATION (OUTPATIENT)
Dept: CARE COORDINATION | Age: 78
End: 2018-08-09

## 2018-08-10 ENCOUNTER — OFFICE VISIT (OUTPATIENT)
Dept: PULMONOLOGY | Age: 78
End: 2018-08-10
Payer: MEDICARE

## 2018-08-10 VITALS
DIASTOLIC BLOOD PRESSURE: 88 MMHG | SYSTOLIC BLOOD PRESSURE: 151 MMHG | RESPIRATION RATE: 18 BRPM | OXYGEN SATURATION: 93 % | HEIGHT: 66 IN | BODY MASS INDEX: 32.14 KG/M2 | HEART RATE: 60 BPM | WEIGHT: 200 LBS

## 2018-08-10 VITALS
DIASTOLIC BLOOD PRESSURE: 88 MMHG | HEART RATE: 64 BPM | SYSTOLIC BLOOD PRESSURE: 151 MMHG | HEIGHT: 66 IN | OXYGEN SATURATION: 93 % | RESPIRATION RATE: 18 BRPM | BODY MASS INDEX: 32.14 KG/M2 | WEIGHT: 200 LBS

## 2018-08-10 DIAGNOSIS — J84.9 INTERSTITIAL LUNG DISEASE (HCC): Primary | ICD-10-CM

## 2018-08-10 DIAGNOSIS — J84.10 PULMONARY FIBROSIS (HCC): Primary | ICD-10-CM

## 2018-08-10 DIAGNOSIS — I27.20 PULMONARY HYPERTENSION (HCC): ICD-10-CM

## 2018-08-10 DIAGNOSIS — J84.9 INTERSTITIAL LUNG DISEASE (HCC): ICD-10-CM

## 2018-08-10 DIAGNOSIS — J84.10 PULMONARY FIBROSIS (HCC): ICD-10-CM

## 2018-08-10 PROCEDURE — G8417 CALC BMI ABV UP PARAM F/U: HCPCS | Performed by: INTERNAL MEDICINE

## 2018-08-10 PROCEDURE — 4040F PNEUMOC VAC/ADMIN/RCVD: CPT | Performed by: INTERNAL MEDICINE

## 2018-08-10 PROCEDURE — 99215 OFFICE O/P EST HI 40 MIN: CPT | Performed by: INTERNAL MEDICINE

## 2018-08-10 PROCEDURE — 94729 DIFFUSING CAPACITY: CPT | Performed by: INTERNAL MEDICINE

## 2018-08-10 PROCEDURE — 1101F PT FALLS ASSESS-DOCD LE1/YR: CPT | Performed by: INTERNAL MEDICINE

## 2018-08-10 PROCEDURE — G8399 PT W/DXA RESULTS DOCUMENT: HCPCS | Performed by: INTERNAL MEDICINE

## 2018-08-10 PROCEDURE — 1036F TOBACCO NON-USER: CPT | Performed by: INTERNAL MEDICINE

## 2018-08-10 PROCEDURE — G8598 ASA/ANTIPLAT THER USED: HCPCS | Performed by: INTERNAL MEDICINE

## 2018-08-10 PROCEDURE — 94726 PLETHYSMOGRAPHY LUNG VOLUMES: CPT | Performed by: INTERNAL MEDICINE

## 2018-08-10 PROCEDURE — 94375 RESPIRATORY FLOW VOLUME LOOP: CPT | Performed by: INTERNAL MEDICINE

## 2018-08-10 PROCEDURE — 1090F PRES/ABSN URINE INCON ASSESS: CPT | Performed by: INTERNAL MEDICINE

## 2018-08-10 PROCEDURE — 1123F ACP DISCUSS/DSCN MKR DOCD: CPT | Performed by: INTERNAL MEDICINE

## 2018-08-10 PROCEDURE — G8428 CUR MEDS NOT DOCUMENT: HCPCS | Performed by: INTERNAL MEDICINE

## 2018-08-10 ASSESSMENT — ENCOUNTER SYMPTOMS
GASTROINTESTINAL NEGATIVE: 1
ALLERGIC/IMMUNOLOGIC NEGATIVE: 1
SHORTNESS OF BREATH: 1
COUGH: 1
EYES NEGATIVE: 1
CHEST TIGHTNESS: 1

## 2018-08-10 NOTE — PROGRESS NOTES
disease was apparently negative. No occupational exposures. Worked as a nurse. Remote history of smoking quit over 30 years ago. No family history of interstitial lung disease. Reports no history of medications known to provoke fibrosis. Most recent echocardiogram done a month ago demonstrated severe right heart failure with right ventricular dilated dictation and estimated right ventricular systolic pressure of 70. There was leftward compression of the intraventricular septum as well as a moderate decrease in left ventricular systolic function. Review of Systems   Constitutional: Negative. HENT: Negative. Eyes: Negative. Respiratory: Positive for cough, chest tightness and shortness of breath. Cardiovascular: Positive for leg swelling. Gastrointestinal: Negative. Endocrine: Negative. Genitourinary: Negative. Musculoskeletal: Positive for gait problem. Skin: Negative. Allergic/Immunologic: Negative. Hematological: Negative. Psychiatric/Behavioral: Negative. All other systems reviewed and are negative. Objective:     Physical Exam   Constitutional: She is oriented to person, place, and time. She appears well-developed and well-nourished. She appears distressed. HENT:   Head: Normocephalic. Nose: Nose normal.   Mouth/Throat: Oropharynx is clear and moist. No oropharyngeal exudate. Eyes: Conjunctivae are normal. No scleral icterus. Neck: Neck supple. JVD present. No tracheal deviation present. No thyromegaly present. Cardiovascular: Normal rate, regular rhythm and normal heart sounds. Exam reveals no gallop. No murmur heard. P2 increased. Holosystolic murmur right sternal border. Pulmonary/Chest: She is in respiratory distress. She has rales. Abdominal: Soft. She exhibits no mass. There is no tenderness. There is no rebound and no guarding. Musculoskeletal: She exhibits edema. No clubbing. Lymphadenopathy:     She has no cervical adenopathy. Neurological: She is alert and oriented to person, place, and time. Skin: Skin is warm and dry. No rash noted. No erythema. No pallor. Nursing note and vitals reviewed. Wt Readings from Last 3 Encounters:   08/10/18 200 lb (90.7 kg)   08/10/18 200 lb (90.7 kg)   07/24/18 199 lb (90.3 kg)       Results for orders placed or performed in visit on 08/09/18   POCT Urinalysis no Micro   Result Value Ref Range    Color, UA yellow     Clarity, UA clear     Glucose, UA POC neg     Bilirubin, UA neg     Ketones, UA neg     Spec Grav, UA 1.015     Blood, UA POC neg     pH, UA 7.0     Protein, UA POC 30 (A)     Urobilinogen, UA neg     Leukocytes, UA neg     Nitrite, UA neg        Assessment:      1. Pulmonary fibrosis ([address]    2. Pulmonary hypertension ([address]    3. Interstitial lung disease ([facility]          Plan:      1. Long discussion with patient and friend regarding the pathophysiology, natural history, and treatment options for IPF/UIP. I advised her that her disease is severe and has progressed significantly both radiographically, physiologically, and clinically over the last 3 years. Typical for IPF. Pulmonary hypertension, undoubtedly related to severe pulmonary fibrosis. 2. Discussed the issue of tyrosine kinase inhibitors. These medications do not typically improve symptoms. Modest reduction in progression although no clear survival benefit. 3. Discussed clinical trials relative to IPF. Patient wishes to explore further. Should she not qualify, patient would have the option of resuming Esberit. 4. Increase oxygen to 4 L a minute with exercise. 5. Encouraged modest exercise. Avoid exercise near-syncope. 6. Flu shot in fall. 7. Discussed palliation and end-of-life issues. 8. Return in 3 months. Sooner if new or advancing symptoms. Spent greater than 25 minutes in direct patient patient communication.       Electronically signed by Leo Dunne DO on 8/10/2018 at 5:28 PM

## 2018-08-21 ENCOUNTER — CARE COORDINATION (OUTPATIENT)
Dept: CARE COORDINATION | Age: 78
End: 2018-08-21

## 2018-08-22 ENCOUNTER — TELEPHONE (OUTPATIENT)
Dept: PULMONOLOGY | Age: 78
End: 2018-08-22

## 2018-08-22 ENCOUNTER — CARE COORDINATION (OUTPATIENT)
Dept: CARE COORDINATION | Age: 78
End: 2018-08-22

## 2018-08-22 DIAGNOSIS — J84.9 INTERSTITIAL LUNG DISEASE (HCC): ICD-10-CM

## 2018-08-22 DIAGNOSIS — J84.10 PULMONARY FIBROSIS (HCC): Primary | ICD-10-CM

## 2018-09-05 ENCOUNTER — CARE COORDINATION (OUTPATIENT)
Dept: CARE COORDINATION | Age: 78
End: 2018-09-05

## 2018-09-05 NOTE — CARE COORDINATION
per week?:  0  Do you salt your food before tasting it?:  No     No patient-reported symptoms      Symptoms:   None:  Yes      Weight trend:  stable  Salt intake watch compared to last visit:  stable      and   COPD Assessment    Does the patient understand envrionmental exposure?:  Yes  Is the patient able to verbalize Rescue vs. Long Acting medications?:  Yes  Does the patient have a nebulizer?:  Yes     No patient-reported symptoms         Symptoms:

## 2018-09-06 ENCOUNTER — HOSPITAL ENCOUNTER (OUTPATIENT)
Dept: OTHER | Age: 78
Discharge: HOME | End: 2018-09-06
Payer: MEDICARE

## 2018-09-06 VITALS
RESPIRATION RATE: 18 BRPM | BODY MASS INDEX: 31.98 KG/M2 | HEART RATE: 68 BPM | SYSTOLIC BLOOD PRESSURE: 120 MMHG | DIASTOLIC BLOOD PRESSURE: 60 MMHG | WEIGHT: 199 LBS | OXYGEN SATURATION: 88 % | HEIGHT: 66 IN

## 2018-09-06 LAB
ANION GAP SERPL CALCULATED.3IONS-SCNC: 12 MMOL/L (ref 9–17)
BNP INTERPRETATION: ABNORMAL
BUN BLDV-MCNC: 17 MG/DL (ref 8–23)
CHLORIDE BLD-SCNC: 100 MMOL/L (ref 98–107)
CO2: 26 MMOL/L (ref 20–31)
CREAT SERPL-MCNC: 0.68 MG/DL (ref 0.5–0.9)
GFR AFRICAN AMERICAN: >60 ML/MIN
GFR NON-AFRICAN AMERICAN: >60 ML/MIN
GFR SERPL CREATININE-BSD FRML MDRD: NORMAL ML/MIN/{1.73_M2}
GFR SERPL CREATININE-BSD FRML MDRD: NORMAL ML/MIN/{1.73_M2}
POTASSIUM SERPL-SCNC: 4.2 MMOL/L (ref 3.7–5.3)
PRO-BNP: 4229 PG/ML
SODIUM BLD-SCNC: 138 MMOL/L (ref 135–144)

## 2018-09-06 PROCEDURE — 82565 ASSAY OF CREATININE: CPT

## 2018-09-06 PROCEDURE — 83880 ASSAY OF NATRIURETIC PEPTIDE: CPT

## 2018-09-06 PROCEDURE — 80051 ELECTROLYTE PANEL: CPT

## 2018-09-06 PROCEDURE — 99211 OFF/OP EST MAY X REQ PHY/QHP: CPT

## 2018-09-06 PROCEDURE — 36415 COLL VENOUS BLD VENIPUNCTURE: CPT

## 2018-09-06 PROCEDURE — 84520 ASSAY OF UREA NITROGEN: CPT

## 2018-09-14 ENCOUNTER — CARE COORDINATION (OUTPATIENT)
Dept: CARE COORDINATION | Age: 78
End: 2018-09-14

## 2018-09-17 ENCOUNTER — CARE COORDINATION (OUTPATIENT)
Dept: CARE COORDINATION | Age: 78
End: 2018-09-17

## 2018-09-19 ENCOUNTER — CARE COORDINATION (OUTPATIENT)
Dept: CARE COORDINATION | Age: 78
End: 2018-09-19

## 2018-09-19 NOTE — CARE COORDINATION
Received VM from [facility] medical equipment-returned the call and spoke with Michi Espinoza who said Jeana Carbajal now wants a transport wheelchair instead of a lightweight wheelchair. Michi Espinoza also said she just found out that the articulating leg rests only fit on a standard wheelchair and not on a lightweight or transfer wheelchair. Placed PC to Jeana Carbajal who states she wants to go back to [facility] and try the wheelchairs to see which will be better for her. Jeana Carbajal will get back to [facility] when she knows which one she wants. CC Plan: Notified Dr. Dereck Mays of above.  Will wait for [facility].

## 2018-09-27 ENCOUNTER — OFFICE VISIT (OUTPATIENT)
Dept: OBGYN CLINIC | Age: 78
End: 2018-09-27
Payer: MEDICARE

## 2018-09-27 VITALS
BODY MASS INDEX: 31.02 KG/M2 | SYSTOLIC BLOOD PRESSURE: 122 MMHG | HEART RATE: 68 BPM | WEIGHT: 193 LBS | DIASTOLIC BLOOD PRESSURE: 76 MMHG | HEIGHT: 66 IN

## 2018-09-27 DIAGNOSIS — N95.0 PMB (POSTMENOPAUSAL BLEEDING): Primary | ICD-10-CM

## 2018-09-27 PROCEDURE — G8417 CALC BMI ABV UP PARAM F/U: HCPCS | Performed by: OBSTETRICS & GYNECOLOGY

## 2018-09-27 PROCEDURE — 1101F PT FALLS ASSESS-DOCD LE1/YR: CPT | Performed by: OBSTETRICS & GYNECOLOGY

## 2018-09-27 PROCEDURE — 1090F PRES/ABSN URINE INCON ASSESS: CPT | Performed by: OBSTETRICS & GYNECOLOGY

## 2018-09-27 PROCEDURE — 1036F TOBACCO NON-USER: CPT | Performed by: OBSTETRICS & GYNECOLOGY

## 2018-09-27 PROCEDURE — G8598 ASA/ANTIPLAT THER USED: HCPCS | Performed by: OBSTETRICS & GYNECOLOGY

## 2018-09-27 PROCEDURE — 99204 OFFICE O/P NEW MOD 45 MIN: CPT | Performed by: OBSTETRICS & GYNECOLOGY

## 2018-09-27 PROCEDURE — 4040F PNEUMOC VAC/ADMIN/RCVD: CPT | Performed by: OBSTETRICS & GYNECOLOGY

## 2018-09-27 PROCEDURE — G8427 DOCREV CUR MEDS BY ELIG CLIN: HCPCS | Performed by: OBSTETRICS & GYNECOLOGY

## 2018-09-27 PROCEDURE — 1123F ACP DISCUSS/DSCN MKR DOCD: CPT | Performed by: OBSTETRICS & GYNECOLOGY

## 2018-09-27 PROCEDURE — G8399 PT W/DXA RESULTS DOCUMENT: HCPCS | Performed by: OBSTETRICS & GYNECOLOGY

## 2018-09-27 NOTE — PROGRESS NOTES
Procedure Laterality Date    BREAST BIOPSY  1987    left benign    BYPASS GRAFT N/A     3 cardiac bypasses. Most recent 2010. [place]; 01\2010    triple bypass (2)    CARDIOVERSION      10/25/16    CHOLECYSTECTOMY  1998       Family History   Problem Relation Age of Onset    Heart Disease Father         congestive heart failure    Liver Disease Mother         liver failure    Heart Disease Brother         MI all 4 brothers   [facility] Cancer Sister         colon, lung    Other Sister         lupus, kidney failure    High Blood Pressure Sister     High Cholesterol Sister     Stroke Sister     Heart Disease Sister         MI for both sisters    Cancer Sister         lung cancer both sisters    Stroke Maternal Grandmother        Social History     Social History    Marital status:      Spouse name: N/A    Number of children: N/A    Years of education: N/A     Occupational History    Not on file. Social History Main Topics    Smoking status: Former Smoker     Years: 25.00    Smokeless tobacco: Never Used    Alcohol use Yes      Comment: occ    Drug use: No    Sexual activity: No     Other Topics Concern    Not on file     Social History Narrative    No narrative on file         MEDICATIONS:  Current Outpatient Prescriptions   Medication Sig Dispense Refill    citalopram (CELEXA) 20 MG tablet TAKE ONE TABLET BY MOUTH DAILY 30 tablet 2    Misc. Devices MISC 1 each by Does not apply route continuous The patient needs extensions for the tubing also. She wears O2 as she ambulates around the house. 10 of these please. 48 each 2    Misc.  Devices (ROLLATOR) MISC 1 each by Does not apply route as needed (use for ambulation) 1 each 0    OXYGEN Inhale 2 L into the lungs continuous       omeprazole (PRILOSEC) 40 MG delayed release capsule TAKE 1 CAPSULE EVERY DAY 90 capsule 1    losartan (COZAAR) 25 MG tablet Take 25 mg by mouth daily      potassium chloride (MICRO-K) 10 MEQ CVA, Migraines, Epilepsy, Seizure Hx, or Limb Weakness  Dermatological ROS: No Rash, Itching, Hives, Mole Changes or Cancer          Blood pressure 122/76, pulse 68, height 5' 6\" (1.676 m), weight 193 lb (87.5 kg), last menstrual period 11/01/1985, not currently breastfeeding. Abdomen: Soft non-tender; good bowel sounds. No guarding, rebound or rigidity. No CVA tenderness bilaterally. Extremities: No calf tenderness, DTR 2/4, and No edema bilaterally    Pelvic: Exam deferred. Diagnostics:  No results found. Lab Results:  Results for orders placed or performed during the hospital encounter of 09/06/18   Brain Natriuretic Peptide   Result Value Ref Range    Pro-BNP 4,229 (H) <300 pg/mL    BNP Interpretation         BUN & Creatinine   Result Value Ref Range    BUN 17 8 - 23 mg/dL    CREATININE 0.68 0.50 - 0.90 mg/dL    GFR Non-African American >60 >60 mL/min    GFR African American >60 >60 mL/min    GFR Comment          GFR Staging NOT REPORTED    Electrolyte Panel   Result Value Ref Range    Sodium 138 135 - 144 mmol/L    Potassium 4.2 3.7 - 5.3 mmol/L    Chloride 100 98 - 107 mmol/L    CO2 26 20 - 31 mmol/L    Anion Gap 12 9 - 17 mmol/L         Assessment:   Diagnosis Orders   1.  PMB (postmenopausal bleeding)       Patient Active Problem List    Diagnosis Date Noted    Interstitial lung disease ([address] 07/03/2018    Centrilobular emphysema ([address] 07/03/2018    Pulmonary hypertension ([address] 07/03/2018    Chronic diastolic CHF (congestive heart failure) ([address] 07/03/2018    Pulmonary embolus ([address] 07/03/2018    Acute cystitis without hematuria 12/21/2017    Pneumonia 12/20/2017    Mixed hyperlipidemia 12/20/2017    Lung nodule, solitary 09/29/2017    Pulmonary fibrosis ([address] 08/07/2017    Atrial fibrillation (HCC)     Iatrogenic hypotension 03/26/2014    GERD (gastroesophageal reflux disease)     Anxiety     Lipids abnormal     Hypertension     Heart disease     CAD (coronary artery disease), native coronary artery 12/10/2010    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 SBP recovered and in 130's. /      Hypotension, unspecified 01/13/2010     Overview:   Post op required levophed until 1/15. SBP recovered and in 130's. /      S/P CABG x 4 01/12/2010     Overview:   Pt is on asa/statin/bb/plavix. Postpump echo ok. PMWdc'd.  /    Overview:   Overview:   Pt is on asa/statin/bb/plavix. Postpump echo ok. PMWdc'd.  /     Mitzy Montero Hypertensive heart disease without heart failure 04/23/2004           PLAN:  Return in about 4 weeks (around 10/25/2018) for Follow up labs/diagnostics. Pelvic exam at follow up  Repeat Annual every 1 year  Cervical Cytology Evaluation begins at 22years old. If Negative Cytology, Follow-up screening per current guidelines. Return to the office in 3-4 weeks. Counseled on preventative health maintenance follow-up. No orders of the defined types were placed in this encounter. Patient was seen with total face to face time of 45 minutes. More than 50% of this visit was counseling and education regarding The encounter diagnosis was PMB (postmenopausal bleeding). and Established New Doctor (post menopausal bleeding) and Vaginal Bleeding   as well as  counseling on preventative health maintenance follow-up.

## 2018-10-03 ENCOUNTER — HOSPITAL ENCOUNTER (OUTPATIENT)
Dept: ULTRASOUND IMAGING | Age: 78
Discharge: HOME | End: 2018-10-05
Payer: MEDICARE

## 2018-10-03 ENCOUNTER — CARE COORDINATION (OUTPATIENT)
Dept: CARE COORDINATION | Age: 78
End: 2018-10-03

## 2018-10-03 DIAGNOSIS — N95.0 POSTMENOPAUSAL BLEEDING: ICD-10-CM

## 2018-10-03 PROCEDURE — 76856 US EXAM PELVIC COMPLETE: CPT

## 2018-10-09 ENCOUNTER — TELEPHONE (OUTPATIENT)
Dept: OBGYN CLINIC | Age: 78
End: 2018-10-09

## 2018-10-11 ENCOUNTER — HOSPITAL ENCOUNTER (OUTPATIENT)
Dept: OTHER | Age: 78
Discharge: HOME | End: 2018-10-11
Payer: MEDICARE

## 2018-10-11 VITALS
OXYGEN SATURATION: 92 % | HEIGHT: 66 IN | WEIGHT: 199 LBS | RESPIRATION RATE: 18 BRPM | BODY MASS INDEX: 31.98 KG/M2 | HEART RATE: 78 BPM | DIASTOLIC BLOOD PRESSURE: 72 MMHG | SYSTOLIC BLOOD PRESSURE: 112 MMHG

## 2018-10-11 LAB
ANION GAP SERPL CALCULATED.3IONS-SCNC: 11 MMOL/L (ref 9–17)
BNP INTERPRETATION: ABNORMAL
BUN BLDV-MCNC: 17 MG/DL (ref 8–23)
CHLORIDE BLD-SCNC: 99 MMOL/L (ref 98–107)
CO2: 28 MMOL/L (ref 20–31)
CREAT SERPL-MCNC: 0.76 MG/DL (ref 0.5–0.9)
GFR AFRICAN AMERICAN: >60 ML/MIN
GFR NON-AFRICAN AMERICAN: >60 ML/MIN
GFR SERPL CREATININE-BSD FRML MDRD: NORMAL ML/MIN/{1.73_M2}
GFR SERPL CREATININE-BSD FRML MDRD: NORMAL ML/MIN/{1.73_M2}
POTASSIUM SERPL-SCNC: 3.9 MMOL/L (ref 3.7–5.3)
PRO-BNP: 3020 PG/ML
SODIUM BLD-SCNC: 138 MMOL/L (ref 135–144)

## 2018-10-11 PROCEDURE — 84520 ASSAY OF UREA NITROGEN: CPT

## 2018-10-11 PROCEDURE — 99211 OFF/OP EST MAY X REQ PHY/QHP: CPT

## 2018-10-11 PROCEDURE — 36415 COLL VENOUS BLD VENIPUNCTURE: CPT

## 2018-10-11 PROCEDURE — 83880 ASSAY OF NATRIURETIC PEPTIDE: CPT

## 2018-10-11 PROCEDURE — 82565 ASSAY OF CREATININE: CPT

## 2018-10-11 PROCEDURE — 80051 ELECTROLYTE PANEL: CPT

## 2018-10-25 ENCOUNTER — CARE COORDINATION (OUTPATIENT)
Dept: CARE COORDINATION | Age: 78
End: 2018-10-25

## 2018-11-06 ENCOUNTER — HOSPITAL ENCOUNTER (OUTPATIENT)
Dept: OTHER | Age: 78
Discharge: HOME | End: 2018-11-06
Payer: MEDICARE

## 2018-11-06 VITALS
HEIGHT: 66 IN | SYSTOLIC BLOOD PRESSURE: 108 MMHG | BODY MASS INDEX: 32.13 KG/M2 | HEART RATE: 74 BPM | RESPIRATION RATE: 20 BRPM | DIASTOLIC BLOOD PRESSURE: 70 MMHG | WEIGHT: 199.9 LBS | OXYGEN SATURATION: 100 %

## 2018-11-06 LAB
ANION GAP SERPL CALCULATED.3IONS-SCNC: 14 MMOL/L (ref 9–17)
BNP INTERPRETATION: ABNORMAL
BUN BLDV-MCNC: 14 MG/DL (ref 8–23)
CHLORIDE BLD-SCNC: 96 MMOL/L (ref 98–107)
CO2: 26 MMOL/L (ref 20–31)
CREAT SERPL-MCNC: 0.62 MG/DL (ref 0.5–0.9)
GFR AFRICAN AMERICAN: >60 ML/MIN
GFR NON-AFRICAN AMERICAN: >60 ML/MIN
GFR SERPL CREATININE-BSD FRML MDRD: NORMAL ML/MIN/{1.73_M2}
GFR SERPL CREATININE-BSD FRML MDRD: NORMAL ML/MIN/{1.73_M2}
POTASSIUM SERPL-SCNC: 3.7 MMOL/L (ref 3.7–5.3)
PRO-BNP: 5677 PG/ML
SODIUM BLD-SCNC: 136 MMOL/L (ref 135–144)

## 2018-11-06 PROCEDURE — 83880 ASSAY OF NATRIURETIC PEPTIDE: CPT

## 2018-11-06 PROCEDURE — 84520 ASSAY OF UREA NITROGEN: CPT

## 2018-11-06 PROCEDURE — 82565 ASSAY OF CREATININE: CPT

## 2018-11-06 PROCEDURE — 36415 COLL VENOUS BLD VENIPUNCTURE: CPT

## 2018-11-06 PROCEDURE — 99211 OFF/OP EST MAY X REQ PHY/QHP: CPT

## 2018-11-06 PROCEDURE — 80051 ELECTROLYTE PANEL: CPT

## 2018-11-12 ENCOUNTER — OFFICE VISIT (OUTPATIENT)
Dept: PULMONOLOGY | Age: 78
End: 2018-11-12
Payer: MEDICARE

## 2018-11-12 VITALS
HEART RATE: 93 BPM | HEIGHT: 66 IN | WEIGHT: 193 LBS | SYSTOLIC BLOOD PRESSURE: 136 MMHG | DIASTOLIC BLOOD PRESSURE: 82 MMHG | OXYGEN SATURATION: 93 % | BODY MASS INDEX: 31.02 KG/M2 | RESPIRATION RATE: 14 BRPM

## 2018-11-12 DIAGNOSIS — J84.112 UIP (USUAL INTERSTITIAL PNEUMONITIS) (HCC): Primary | ICD-10-CM

## 2018-11-12 DIAGNOSIS — I27.20 PULMONARY HYPERTENSION (HCC): ICD-10-CM

## 2018-11-12 DIAGNOSIS — Z99.89 OSA ON CPAP: ICD-10-CM

## 2018-11-12 DIAGNOSIS — I50.32 CHRONIC DIASTOLIC CHF (CONGESTIVE HEART FAILURE) (HCC): ICD-10-CM

## 2018-11-12 DIAGNOSIS — G47.33 OSA ON CPAP: ICD-10-CM

## 2018-11-12 DIAGNOSIS — J84.112 IPF (IDIOPATHIC PULMONARY FIBROSIS) (HCC): ICD-10-CM

## 2018-11-12 DIAGNOSIS — J96.11 CHRONIC HYPOXEMIC RESPIRATORY FAILURE (HCC): ICD-10-CM

## 2018-11-12 PROCEDURE — G8427 DOCREV CUR MEDS BY ELIG CLIN: HCPCS | Performed by: INTERNAL MEDICINE

## 2018-11-12 PROCEDURE — 99213 OFFICE O/P EST LOW 20 MIN: CPT | Performed by: INTERNAL MEDICINE

## 2018-11-12 PROCEDURE — 1123F ACP DISCUSS/DSCN MKR DOCD: CPT | Performed by: INTERNAL MEDICINE

## 2018-11-12 PROCEDURE — G8417 CALC BMI ABV UP PARAM F/U: HCPCS | Performed by: INTERNAL MEDICINE

## 2018-11-12 PROCEDURE — G8598 ASA/ANTIPLAT THER USED: HCPCS | Performed by: INTERNAL MEDICINE

## 2018-11-12 PROCEDURE — 1090F PRES/ABSN URINE INCON ASSESS: CPT | Performed by: INTERNAL MEDICINE

## 2018-11-12 PROCEDURE — 1036F TOBACCO NON-USER: CPT | Performed by: INTERNAL MEDICINE

## 2018-11-12 PROCEDURE — 1101F PT FALLS ASSESS-DOCD LE1/YR: CPT | Performed by: INTERNAL MEDICINE

## 2018-11-12 PROCEDURE — G8482 FLU IMMUNIZE ORDER/ADMIN: HCPCS | Performed by: INTERNAL MEDICINE

## 2018-11-12 PROCEDURE — 4040F PNEUMOC VAC/ADMIN/RCVD: CPT | Performed by: INTERNAL MEDICINE

## 2018-11-12 PROCEDURE — G8399 PT W/DXA RESULTS DOCUMENT: HCPCS | Performed by: INTERNAL MEDICINE

## 2018-11-12 ASSESSMENT — ENCOUNTER SYMPTOMS
GASTROINTESTINAL NEGATIVE: 1
SHORTNESS OF BREATH: 1
COUGH: 1
EYES NEGATIVE: 1
CHEST TIGHTNESS: 1

## 2018-11-13 ENCOUNTER — TELEPHONE (OUTPATIENT)
Dept: PULMONOLOGY | Age: 78
End: 2018-11-13

## 2018-11-13 ENCOUNTER — CARE COORDINATION (OUTPATIENT)
Dept: CARE COORDINATION | Age: 78
End: 2018-11-13

## 2018-11-13 NOTE — CARE COORDINATION
Attempted to reach Kb Almaguer for CC follow up of IPF and CHF. Left VM requesting a return PC to [facility] at [phone]. CC Plan: Attempt to reach Kb Almaguer next week.

## 2018-11-13 NOTE — PROGRESS NOTES
Musculoskeletal: She exhibits no edema. Mild clubbing. Lymphadenopathy:     She has no cervical adenopathy. Neurological: She is alert and oriented to person, place, and time. Skin: Skin is warm and dry. Nursing note and vitals reviewed. Wt Readings from Last 3 Encounters:   11/12/18 193 lb (87.5 kg)   11/06/18 199 lb 14.4 oz (90.7 kg)   10/25/18 193 lb (87.5 kg)       Results for orders placed or performed during the hospital encounter of 11/06/18   Brain Natriuretic Peptide   Result Value Ref Range    Pro-BNP 5,677 (H) <300 pg/mL    BNP Interpretation         BUN & Creatinine   Result Value Ref Range    BUN 14 8 - 23 mg/dL    CREATININE 0.62 0.50 - 0.90 mg/dL    GFR Non-African American >60 >60 mL/min    GFR African American >60 >60 mL/min    GFR Comment          GFR Staging NOT REPORTED    Electrolyte Panel   Result Value Ref Range    Sodium 136 135 - 144 mmol/L    Potassium 3.7 3.7 - 5.3 mmol/L    Chloride 96 (L) 98 - 107 mmol/L    CO2 26 20 - 31 mmol/L    Anion Gap 14 9 - 17 mmol/L       Assessment:      1. UIP (usual interstitial pneumonitis) (HCC)    2. IPF (idiopathic pulmonary fibrosis) ([address]    3. LIANET on CPAP    4. Chronic diastolic CHF (congestive heart failure) ([address]    5. Pulmonary hypertension ([address]    6. Chronic hypoxemic respiratory failure ([address]          Plan:      1. Patient interested in clinical trial.  Advised her that trial not currently underway. We'll notify. 2. Best supportive care. Patient had previously been on pirfenidone but progressed and did not feel better. Wishes to discontinue. Advised them that not likely that Ofev would change her clinical condition. 3. He should already received her flu shot. 4. Continue oxygen per  5. Discussed with patient and son. Neither receptive to hospice or palliative care. Patient was seen at [facility] for transplant evaluation but told that her age precluded same. 6. Return in 4 months.      Electronically signed by

## 2018-11-15 ENCOUNTER — TELEPHONE (OUTPATIENT)
Dept: PULMONOLOGY | Age: 78
End: 2018-11-15

## 2018-11-15 DIAGNOSIS — Z99.89 OSA ON CPAP: Primary | ICD-10-CM

## 2018-11-15 DIAGNOSIS — G47.33 OSA ON CPAP: Primary | ICD-10-CM

## 2018-11-19 ENCOUNTER — CARE COORDINATION (OUTPATIENT)
Dept: CARE COORDINATION | Age: 78
End: 2018-11-19

## 2018-11-19 ASSESSMENT — ENCOUNTER SYMPTOMS: DYSPNEA ASSOCIATED WITH: REST

## 2018-11-28 ENCOUNTER — CARE COORDINATION (OUTPATIENT)
Dept: CARE COORDINATION | Age: 78
End: 2018-11-28

## 2018-11-29 ENCOUNTER — CARE COORDINATION (OUTPATIENT)
Dept: CARE COORDINATION | Age: 78
End: 2018-11-29

## 2018-11-29 NOTE — CARE COORDINATION
Placed PC to [address] [place] [address] Patient to check on the status of Erum's CPAP supplies order. Spoke with Rg Allan at [facility] who said they tried to reach Bigg Ivey this AM for intake but couldn't get a hold of her so they mailed the intake forms to Bigg Ivey. RNCC called Bigg Ivey and gave her the contact number for [facility] so she can get the intake done today.

## 2018-12-06 ENCOUNTER — CARE COORDINATION (OUTPATIENT)
Dept: CARE COORDINATION | Age: 78
End: 2018-12-06

## 2018-12-07 ENCOUNTER — CARE COORDINATION (OUTPATIENT)
Dept: CARE COORDINATION | Age: 78
End: 2018-12-07

## 2018-12-07 ASSESSMENT — ENCOUNTER SYMPTOMS: DYSPNEA ASSOCIATED WITH: MINIMAL EXERTION

## 2018-12-10 ENCOUNTER — CARE COORDINATION (OUTPATIENT)
Dept: CARE COORDINATION | Age: 78
End: 2018-12-10

## 2018-12-13 ENCOUNTER — HOSPITAL ENCOUNTER (OUTPATIENT)
Age: 78
Discharge: HOME | End: 2018-12-13
Payer: MEDICARE

## 2018-12-13 ENCOUNTER — HOSPITAL ENCOUNTER (OUTPATIENT)
Dept: OTHER | Age: 78
Discharge: HOME | End: 2018-12-13
Payer: MEDICARE

## 2018-12-13 VITALS
RESPIRATION RATE: 18 BRPM | OXYGEN SATURATION: 96 % | HEART RATE: 60 BPM | BODY MASS INDEX: 30.86 KG/M2 | HEIGHT: 66 IN | WEIGHT: 192 LBS | DIASTOLIC BLOOD PRESSURE: 60 MMHG | SYSTOLIC BLOOD PRESSURE: 100 MMHG

## 2018-12-13 DIAGNOSIS — D64.9 ANEMIA, UNSPECIFIED TYPE: ICD-10-CM

## 2018-12-13 LAB
ANION GAP SERPL CALCULATED.3IONS-SCNC: 10 MMOL/L (ref 9–17)
BUN BLDV-MCNC: 20 MG/DL (ref 8–23)
CHLORIDE BLD-SCNC: 101 MMOL/L (ref 98–107)
CO2: 27 MMOL/L (ref 20–31)
CREAT SERPL-MCNC: 0.65 MG/DL (ref 0.5–0.9)
GFR AFRICAN AMERICAN: >60 ML/MIN
GFR NON-AFRICAN AMERICAN: >60 ML/MIN
GFR SERPL CREATININE-BSD FRML MDRD: NORMAL ML/MIN/{1.73_M2}
GFR SERPL CREATININE-BSD FRML MDRD: NORMAL ML/MIN/{1.73_M2}
HCT VFR BLD CALC: 41.9 % (ref 36–46)
HEMOGLOBIN: 13.3 G/DL (ref 12–16)
POTASSIUM SERPL-SCNC: 4.5 MMOL/L (ref 3.7–5.3)
SODIUM BLD-SCNC: 138 MMOL/L (ref 135–144)

## 2018-12-13 PROCEDURE — 80051 ELECTROLYTE PANEL: CPT

## 2018-12-13 PROCEDURE — 85018 HEMOGLOBIN: CPT

## 2018-12-13 PROCEDURE — 82565 ASSAY OF CREATININE: CPT

## 2018-12-13 PROCEDURE — 85014 HEMATOCRIT: CPT

## 2018-12-13 PROCEDURE — 99211 OFF/OP EST MAY X REQ PHY/QHP: CPT

## 2018-12-13 PROCEDURE — 84520 ASSAY OF UREA NITROGEN: CPT

## 2018-12-13 PROCEDURE — 36415 COLL VENOUS BLD VENIPUNCTURE: CPT

## 2018-12-27 ENCOUNTER — CARE COORDINATION (OUTPATIENT)
Dept: CARE COORDINATION | Age: 78
End: 2018-12-27

## 2018-12-28 RX ORDER — MULTIVIT-MIN/IRON FUM/FOLIC AC 7.5 MG-4
1 TABLET ORAL DAILY
COMMUNITY

## 2018-12-28 RX ORDER — CITALOPRAM 20 MG/1
20 TABLET ORAL DAILY
Status: ON HOLD | COMMUNITY
Start: 2018-08-10 | End: 2019-01-30 | Stop reason: HOSPADM

## 2018-12-28 RX ORDER — OMEPRAZOLE 40 MG/1
40 CAPSULE, DELAYED RELEASE ORAL DAILY
Status: ON HOLD | COMMUNITY
Start: 2017-05-03 | End: 2019-01-30 | Stop reason: HOSPADM

## 2018-12-28 RX ORDER — LOSARTAN POTASSIUM 25 MG/1
25 TABLET ORAL DAILY
Status: ON HOLD | COMMUNITY
Start: 2018-05-25 | End: 2019-01-30 | Stop reason: HOSPADM

## 2018-12-28 RX ORDER — POTASSIUM CHLORIDE 750 MG/1
10 CAPSULE, EXTENDED RELEASE ORAL 2 TIMES DAILY
Status: ON HOLD | COMMUNITY
Start: 2018-12-12 | End: 2019-01-30 | Stop reason: HOSPADM

## 2018-12-28 RX ORDER — FUROSEMIDE 20 MG/1
40 TABLET ORAL DAILY
Status: ON HOLD | COMMUNITY
Start: 2018-04-04 | End: 2019-01-30 | Stop reason: HOSPADM

## 2018-12-28 RX ORDER — NITROGLYCERIN 0.4 MG/1
0.4 TABLET SUBLINGUAL PRN
COMMUNITY
End: 2019-03-06 | Stop reason: SDUPTHER

## 2018-12-28 RX ORDER — METOPROLOL SUCCINATE 100 MG/1
100 TABLET, EXTENDED RELEASE ORAL DAILY
COMMUNITY
Start: 2018-11-06

## 2018-12-28 RX ORDER — LORAZEPAM 0.5 MG/1
0.5 TABLET ORAL DAILY
COMMUNITY
Start: 2017-06-26 | End: 2019-03-06 | Stop reason: SDUPTHER

## 2019-01-23 ENCOUNTER — HOSPITAL ENCOUNTER (INPATIENT)
Age: 79
LOS: 7 days | Discharge: HOME | DRG: 291 | End: 2019-01-30
Attending: EMERGENCY MEDICINE | Admitting: FAMILY MEDICINE
Payer: MEDICARE

## 2019-01-23 ENCOUNTER — APPOINTMENT (OUTPATIENT)
Dept: CT IMAGING | Age: 79
DRG: 291 | End: 2019-01-23
Payer: MEDICARE

## 2019-01-23 ENCOUNTER — CARE COORDINATION (OUTPATIENT)
Dept: CARE COORDINATION | Age: 79
End: 2019-01-23

## 2019-01-23 ENCOUNTER — APPOINTMENT (OUTPATIENT)
Dept: GENERAL RADIOLOGY | Age: 79
DRG: 291 | End: 2019-01-23
Payer: MEDICARE

## 2019-01-23 DIAGNOSIS — J84.10 PULMONARY FIBROSIS (HCC): ICD-10-CM

## 2019-01-23 DIAGNOSIS — R09.02 HYPOXIA: Primary | ICD-10-CM

## 2019-01-23 DIAGNOSIS — I50.9 ACUTE ON CHRONIC CONGESTIVE HEART FAILURE, UNSPECIFIED HEART FAILURE TYPE (HCC): ICD-10-CM

## 2019-01-23 DIAGNOSIS — F41.9 ANXIETY: ICD-10-CM

## 2019-01-23 DIAGNOSIS — R91.1 PULMONARY NODULE: ICD-10-CM

## 2019-01-23 LAB
ALBUMIN SERPL-MCNC: 3.6 G/DL (ref 3.5–5.2)
ALBUMIN/GLOBULIN RATIO: ABNORMAL (ref 1–2.5)
ALP BLD-CCNC: 193 U/L (ref 35–104)
ALT SERPL-CCNC: 25 U/L (ref 5–33)
ANION GAP SERPL CALCULATED.3IONS-SCNC: 13 MMOL/L (ref 9–17)
AST SERPL-CCNC: 43 U/L
BILIRUB SERPL-MCNC: 1.74 MG/DL (ref 0.3–1.2)
BNP INTERPRETATION: ABNORMAL
BUN BLDV-MCNC: 17 MG/DL (ref 8–23)
BUN/CREAT BLD: ABNORMAL (ref 9–20)
CALCIUM SERPL-MCNC: 9.7 MG/DL (ref 8.6–10.4)
CHLORIDE BLD-SCNC: 100 MMOL/L (ref 98–107)
CK MB: 2.6 NG/ML
CO2: 27 MMOL/L (ref 20–31)
CREAT SERPL-MCNC: 0.82 MG/DL (ref 0.5–0.9)
EKG ATRIAL RATE: 65 BPM
EKG Q-T INTERVAL: 404 MS
EKG QRS DURATION: 106 MS
EKG QTC CALCULATION (BAZETT): 420 MS
EKG R AXIS: 86 DEGREES
EKG T AXIS: -64 DEGREES
EKG VENTRICULAR RATE: 65 BPM
GFR AFRICAN AMERICAN: >60 ML/MIN
GFR NON-AFRICAN AMERICAN: >60 ML/MIN
GFR SERPL CREATININE-BSD FRML MDRD: ABNORMAL ML/MIN/{1.73_M2}
GFR SERPL CREATININE-BSD FRML MDRD: ABNORMAL ML/MIN/{1.73_M2}
GLUCOSE BLD-MCNC: 132 MG/DL (ref 70–99)
HCT VFR BLD CALC: 42.9 % (ref 36–46)
HEMOGLOBIN: 14.1 G/DL (ref 12–16)
INR BLD: 1.5
MCH RBC QN AUTO: 30.1 PG (ref 26–34)
MCHC RBC AUTO-ENTMCNC: 32.8 G/DL (ref 31–37)
MCV RBC AUTO: 91.9 FL (ref 80–100)
NRBC AUTOMATED: ABNORMAL PER 100 WBC
PDW BLD-RTO: 18.8 % (ref 11.5–14.9)
PLATELET # BLD: 211 K/UL (ref 150–450)
PMV BLD AUTO: 9.7 FL (ref 6–12)
POTASSIUM SERPL-SCNC: 4.2 MMOL/L (ref 3.7–5.3)
PRO-BNP: 9291 PG/ML
PROTHROMBIN TIME: 15.6 SEC (ref 9.7–12)
RBC # BLD: 4.67 M/UL (ref 4–5.2)
SODIUM BLD-SCNC: 140 MMOL/L (ref 135–144)
TOTAL PROTEIN: 7.7 G/DL (ref 6.4–8.3)
TROPONIN INTERP: ABNORMAL
TROPONIN INTERP: ABNORMAL
TROPONIN T: ABNORMAL NG/ML
TROPONIN T: ABNORMAL NG/ML
TROPONIN, HIGH SENSITIVITY: 38 NG/L (ref 0–14)
TROPONIN, HIGH SENSITIVITY: 40 NG/L (ref 0–14)
WBC # BLD: 6.7 K/UL (ref 3.5–11)

## 2019-01-23 PROCEDURE — 85610 PROTHROMBIN TIME: CPT

## 2019-01-23 PROCEDURE — 2580000003 HC RX 258: Performed by: EMERGENCY MEDICINE

## 2019-01-23 PROCEDURE — 71045 X-RAY EXAM CHEST 1 VIEW: CPT

## 2019-01-23 PROCEDURE — 36415 COLL VENOUS BLD VENIPUNCTURE: CPT

## 2019-01-23 PROCEDURE — 84484 ASSAY OF TROPONIN QUANT: CPT

## 2019-01-23 PROCEDURE — 93005 ELECTROCARDIOGRAM TRACING: CPT

## 2019-01-23 PROCEDURE — 6360000004 HC RX CONTRAST MEDICATION: Performed by: EMERGENCY MEDICINE

## 2019-01-23 PROCEDURE — 85027 COMPLETE CBC AUTOMATED: CPT

## 2019-01-23 PROCEDURE — 71260 CT THORAX DX C+: CPT

## 2019-01-23 PROCEDURE — 80053 COMPREHEN METABOLIC PANEL: CPT

## 2019-01-23 PROCEDURE — 83880 ASSAY OF NATRIURETIC PEPTIDE: CPT

## 2019-01-23 PROCEDURE — 82553 CREATINE MB FRACTION: CPT

## 2019-01-23 PROCEDURE — 6360000002 HC RX W HCPCS: Performed by: EMERGENCY MEDICINE

## 2019-01-23 PROCEDURE — 99285 EMERGENCY DEPT VISIT HI MDM: CPT

## 2019-01-23 PROCEDURE — 2060000000 HC ICU INTERMEDIATE R&B

## 2019-01-23 RX ORDER — LOSARTAN POTASSIUM 25 MG/1
25 TABLET ORAL DAILY
Status: DISCONTINUED | OUTPATIENT
Start: 2019-01-24 | End: 2019-01-23 | Stop reason: SDUPTHER

## 2019-01-23 RX ORDER — CITALOPRAM 20 MG/1
1 TABLET ORAL DAILY
Status: DISCONTINUED | OUTPATIENT
Start: 2019-01-24 | End: 2019-01-23 | Stop reason: SDUPTHER

## 2019-01-23 RX ORDER — PANTOPRAZOLE SODIUM 40 MG/1
40 TABLET, DELAYED RELEASE ORAL
Status: DISCONTINUED | OUTPATIENT
Start: 2019-01-24 | End: 2019-01-30 | Stop reason: HOSPADM

## 2019-01-23 RX ORDER — NITROGLYCERIN 0.4 MG/1
0.4 TABLET SUBLINGUAL PRN
Status: DISCONTINUED | OUTPATIENT
Start: 2019-01-23 | End: 2019-01-30 | Stop reason: HOSPADM

## 2019-01-23 RX ORDER — M-VIT,TX,IRON,MINS/CALC/FOLIC 27MG-0.4MG
1 TABLET ORAL DAILY
Status: DISCONTINUED | OUTPATIENT
Start: 2019-01-24 | End: 2019-01-30 | Stop reason: HOSPADM

## 2019-01-23 RX ORDER — METOPROLOL SUCCINATE 100 MG/1
100 TABLET, EXTENDED RELEASE ORAL DAILY
Status: DISCONTINUED | OUTPATIENT
Start: 2019-01-24 | End: 2019-01-30 | Stop reason: HOSPADM

## 2019-01-23 RX ORDER — CALCIUM CARBONATE/VITAMIN D3 600 MG-10
1 TABLET ORAL DAILY
Status: DISCONTINUED | OUTPATIENT
Start: 2019-01-24 | End: 2019-01-24

## 2019-01-23 RX ORDER — NITROGLYCERIN 0.4 MG/1
0.4 TABLET SUBLINGUAL EVERY 5 MIN PRN
Status: DISCONTINUED | OUTPATIENT
Start: 2019-01-23 | End: 2019-01-23 | Stop reason: SDUPTHER

## 2019-01-23 RX ORDER — LORAZEPAM 0.5 MG/1
0.5 TABLET ORAL 3 TIMES DAILY PRN
Status: DISCONTINUED | OUTPATIENT
Start: 2019-01-23 | End: 2019-01-30 | Stop reason: HOSPADM

## 2019-01-23 RX ORDER — MULTIVIT-MIN/IRON FUM/FOLIC AC 7.5 MG-4
1 TABLET ORAL DAILY
Status: DISCONTINUED | OUTPATIENT
Start: 2019-01-24 | End: 2019-01-23 | Stop reason: CLARIF

## 2019-01-23 RX ORDER — LOSARTAN POTASSIUM 25 MG/1
25 TABLET ORAL DAILY
Status: DISCONTINUED | OUTPATIENT
Start: 2019-01-24 | End: 2019-01-24

## 2019-01-23 RX ORDER — ATORVASTATIN CALCIUM 40 MG/1
40 TABLET, FILM COATED ORAL DAILY
Status: DISCONTINUED | OUTPATIENT
Start: 2019-01-24 | End: 2019-01-24

## 2019-01-23 RX ORDER — FUROSEMIDE 40 MG/1
40 TABLET ORAL DAILY
Status: DISCONTINUED | OUTPATIENT
Start: 2019-01-24 | End: 2019-01-27

## 2019-01-23 RX ORDER — SODIUM CHLORIDE 0.9 % (FLUSH) 0.9 %
10 SYRINGE (ML) INJECTION PRN
Status: DISCONTINUED | OUTPATIENT
Start: 2019-01-23 | End: 2019-01-30 | Stop reason: HOSPADM

## 2019-01-23 RX ORDER — LORAZEPAM 0.5 MG/1
0.5 TABLET ORAL DAILY
Status: DISCONTINUED | OUTPATIENT
Start: 2019-01-24 | End: 2019-01-24

## 2019-01-23 RX ORDER — FUROSEMIDE 10 MG/ML
40 INJECTION INTRAMUSCULAR; INTRAVENOUS ONCE
Status: COMPLETED | OUTPATIENT
Start: 2019-01-23 | End: 2019-01-23

## 2019-01-23 RX ORDER — 0.9 % SODIUM CHLORIDE 0.9 %
80 INTRAVENOUS SOLUTION INTRAVENOUS ONCE
Status: COMPLETED | OUTPATIENT
Start: 2019-01-23 | End: 2019-01-23

## 2019-01-23 RX ORDER — ASPIRIN 81 MG/1
81 TABLET ORAL DAILY
Status: DISCONTINUED | OUTPATIENT
Start: 2019-01-24 | End: 2019-01-24

## 2019-01-23 RX ORDER — ACETAMINOPHEN 325 MG/1
650 TABLET ORAL EVERY 4 HOURS PRN
Status: DISCONTINUED | OUTPATIENT
Start: 2019-01-23 | End: 2019-01-30 | Stop reason: HOSPADM

## 2019-01-23 RX ORDER — SODIUM CHLORIDE 0.9 % (FLUSH) 0.9 %
10 SYRINGE (ML) INJECTION PRN
Status: DISCONTINUED | OUTPATIENT
Start: 2019-01-23 | End: 2019-01-23 | Stop reason: SDUPTHER

## 2019-01-23 RX ORDER — SODIUM CHLORIDE 0.9 % (FLUSH) 0.9 %
10 SYRINGE (ML) INJECTION EVERY 12 HOURS SCHEDULED
Status: DISCONTINUED | OUTPATIENT
Start: 2019-01-23 | End: 2019-01-30 | Stop reason: HOSPADM

## 2019-01-23 RX ORDER — FUROSEMIDE 40 MG/1
40 TABLET ORAL DAILY
Status: DISCONTINUED | OUTPATIENT
Start: 2019-01-24 | End: 2019-01-23 | Stop reason: SDUPTHER

## 2019-01-23 RX ORDER — METOPROLOL SUCCINATE 100 MG/1
100 TABLET, EXTENDED RELEASE ORAL DAILY
Status: DISCONTINUED | OUTPATIENT
Start: 2019-01-24 | End: 2019-01-23 | Stop reason: SDUPTHER

## 2019-01-23 RX ORDER — POTASSIUM CHLORIDE 750 MG/1
10 CAPSULE, EXTENDED RELEASE ORAL 2 TIMES DAILY
Status: DISCONTINUED | OUTPATIENT
Start: 2019-01-23 | End: 2019-01-30 | Stop reason: HOSPADM

## 2019-01-23 RX ORDER — CITALOPRAM 20 MG/1
20 TABLET ORAL DAILY
Status: DISCONTINUED | OUTPATIENT
Start: 2019-01-24 | End: 2019-01-24

## 2019-01-23 RX ADMIN — Medication 10 ML: at 19:25

## 2019-01-23 RX ADMIN — SODIUM CHLORIDE 80 ML: 9 INJECTION, SOLUTION INTRAVENOUS at 19:25

## 2019-01-23 RX ADMIN — FUROSEMIDE 40 MG: 10 INJECTION, SOLUTION INTRAMUSCULAR; INTRAVENOUS at 22:20

## 2019-01-23 RX ADMIN — IOVERSOL 75 ML: 741 INJECTION INTRA-ARTERIAL; INTRAVENOUS at 19:25

## 2019-01-23 ASSESSMENT — PAIN SCALES - GENERAL: PAINLEVEL_OUTOF10: 0

## 2019-01-23 ASSESSMENT — ENCOUNTER SYMPTOMS
ABDOMINAL PAIN: 0
BACK PAIN: 0
COLOR CHANGE: 1
SHORTNESS OF BREATH: 1

## 2019-01-24 PROBLEM — E44.0 MODERATE MALNUTRITION (HCC): Chronic | Status: ACTIVE | Noted: 2019-01-24

## 2019-01-24 PROBLEM — J96.21 ACUTE ON CHRONIC RESPIRATORY FAILURE WITH HYPOXEMIA (HCC): Status: ACTIVE | Noted: 2018-11-12

## 2019-01-24 LAB
ALBUMIN SERPL-MCNC: 3.3 G/DL (ref 3.5–5.2)
ALBUMIN/GLOBULIN RATIO: ABNORMAL (ref 1–2.5)
ALLEN TEST: ABNORMAL
ALP BLD-CCNC: 175 U/L (ref 35–104)
ALT SERPL-CCNC: 24 U/L (ref 5–33)
ANION GAP SERPL CALCULATED.3IONS-SCNC: 12 MMOL/L (ref 9–17)
AST SERPL-CCNC: 39 U/L
BILIRUB SERPL-MCNC: 1.38 MG/DL (ref 0.3–1.2)
BNP INTERPRETATION: ABNORMAL
BUN BLDV-MCNC: 17 MG/DL (ref 8–23)
BUN/CREAT BLD: ABNORMAL (ref 9–20)
CALCIUM SERPL-MCNC: 9.8 MG/DL (ref 8.6–10.4)
CARBOXYHEMOGLOBIN: 0.5 %
CHLORIDE BLD-SCNC: 100 MMOL/L (ref 98–107)
CO2: 27 MMOL/L (ref 20–31)
CREAT SERPL-MCNC: 0.77 MG/DL (ref 0.5–0.9)
FIO2: ABNORMAL
GFR AFRICAN AMERICAN: >60 ML/MIN
GFR NON-AFRICAN AMERICAN: >60 ML/MIN
GFR SERPL CREATININE-BSD FRML MDRD: ABNORMAL ML/MIN/{1.73_M2}
GFR SERPL CREATININE-BSD FRML MDRD: ABNORMAL ML/MIN/{1.73_M2}
GLUCOSE BLD-MCNC: 100 MG/DL (ref 70–99)
HCO3 ARTERIAL: 28.8 MMOL/L
HCT VFR BLD CALC: 42.6 % (ref 36–46)
HEMOGLOBIN: 13.5 G/DL (ref 12–16)
MCH RBC QN AUTO: 29.2 PG (ref 26–34)
MCHC RBC AUTO-ENTMCNC: 31.7 G/DL (ref 31–37)
MCV RBC AUTO: 92.1 FL (ref 80–100)
METHEMOGLOBIN: 0.5 %
MODE: ABNORMAL
NEGATIVE BASE EXCESS, ART: ABNORMAL MMOL/L (ref 0–2)
NOTIFICATION TIME: ABNORMAL
NOTIFICATION: ABNORMAL
NRBC AUTOMATED: ABNORMAL PER 100 WBC
O2 DEVICE/FLOW/%: ABNORMAL
O2 SAT, ARTERIAL: 89.3 %
OXYHEMOGLOBIN: ABNORMAL % (ref 95–98)
PATIENT TEMP: 37
PCO2 ARTERIAL: 46.1 MMHG
PCO2, ART, TEMP ADJ: ABNORMAL
PDW BLD-RTO: 19.4 % (ref 11.5–14.9)
PEEP/CPAP: ABNORMAL
PH ARTERIAL: 7.4
PH, ART, TEMP ADJ: ABNORMAL
PLATELET # BLD: 189 K/UL (ref 150–450)
PMV BLD AUTO: 9.5 FL (ref 6–12)
PO2 ARTERIAL: 64.1 MMHG
PO2, ART, TEMP ADJ: ABNORMAL MMHG
POSITIVE BASE EXCESS, ART: 4.1 MMOL/L (ref 0–2)
POTASSIUM SERPL-SCNC: 3.9 MMOL/L (ref 3.7–5.3)
PRO-BNP: ABNORMAL PG/ML
PSV: ABNORMAL
PT. POSITION: ABNORMAL
RBC # BLD: 4.63 M/UL (ref 4–5.2)
RESPIRATORY RATE: 18
SAMPLE SITE: ABNORMAL
SET RATE: ABNORMAL
SODIUM BLD-SCNC: 139 MMOL/L (ref 135–144)
TEXT FOR RESPIRATORY: ABNORMAL
TOTAL HB: ABNORMAL G/DL (ref 12–16)
TOTAL PROTEIN: 7.1 G/DL (ref 6.4–8.3)
TOTAL RATE: ABNORMAL
VT: ABNORMAL
WBC # BLD: 6.1 K/UL (ref 3.5–11)

## 2019-01-24 PROCEDURE — 6370000000 HC RX 637 (ALT 250 FOR IP): Performed by: FAMILY MEDICINE

## 2019-01-24 PROCEDURE — 2580000003 HC RX 258: Performed by: FAMILY MEDICINE

## 2019-01-24 PROCEDURE — 2700000000 HC OXYGEN THERAPY PER DAY

## 2019-01-24 PROCEDURE — 82805 BLOOD GASES W/O2 SATURATION: CPT

## 2019-01-24 PROCEDURE — 85027 COMPLETE CBC AUTOMATED: CPT

## 2019-01-24 PROCEDURE — 99223 1ST HOSP IP/OBS HIGH 75: CPT | Performed by: FAMILY MEDICINE

## 2019-01-24 PROCEDURE — 80053 COMPREHEN METABOLIC PANEL: CPT

## 2019-01-24 PROCEDURE — 94762 N-INVAS EAR/PLS OXIMTRY CONT: CPT

## 2019-01-24 PROCEDURE — 2060000000 HC ICU INTERMEDIATE R&B

## 2019-01-24 PROCEDURE — 83880 ASSAY OF NATRIURETIC PEPTIDE: CPT

## 2019-01-24 PROCEDURE — 36415 COLL VENOUS BLD VENIPUNCTURE: CPT

## 2019-01-24 RX ORDER — ASPIRIN 81 MG/1
81 TABLET ORAL NIGHTLY
Status: DISCONTINUED | OUTPATIENT
Start: 2019-01-24 | End: 2019-01-25

## 2019-01-24 RX ORDER — ATORVASTATIN CALCIUM 40 MG/1
40 TABLET, FILM COATED ORAL NIGHTLY
Status: DISCONTINUED | OUTPATIENT
Start: 2019-01-24 | End: 2019-01-27

## 2019-01-24 RX ORDER — CITALOPRAM 20 MG/1
20 TABLET ORAL NIGHTLY
Status: DISCONTINUED | OUTPATIENT
Start: 2019-01-24 | End: 2019-01-30 | Stop reason: HOSPADM

## 2019-01-24 RX ORDER — LORAZEPAM 0.5 MG/1
0.5 TABLET ORAL NIGHTLY
Status: DISCONTINUED | OUTPATIENT
Start: 2019-01-24 | End: 2019-01-30 | Stop reason: HOSPADM

## 2019-01-24 RX ORDER — ALBUTEROL SULFATE 2.5 MG/3ML
2.5 SOLUTION RESPIRATORY (INHALATION)
Status: DISCONTINUED | OUTPATIENT
Start: 2019-01-24 | End: 2019-01-30 | Stop reason: HOSPADM

## 2019-01-24 RX ORDER — LOSARTAN POTASSIUM 25 MG/1
25 TABLET ORAL NIGHTLY
Status: DISCONTINUED | OUTPATIENT
Start: 2019-01-24 | End: 2019-01-30 | Stop reason: HOSPADM

## 2019-01-24 RX ORDER — CALCIUM CARBONATE/VITAMIN D3 600 MG-10
1 TABLET ORAL NIGHTLY
Status: DISCONTINUED | OUTPATIENT
Start: 2019-01-24 | End: 2019-01-30 | Stop reason: HOSPADM

## 2019-01-24 RX ADMIN — MULTIPLE VITAMINS W/ MINERALS TAB 1 TABLET: TAB at 08:32

## 2019-01-24 RX ADMIN — CITALOPRAM HYDROBROMIDE 20 MG: 20 TABLET ORAL at 20:22

## 2019-01-24 RX ADMIN — POTASSIUM CHLORIDE 10 MEQ: 750 CAPSULE, EXTENDED RELEASE ORAL at 08:31

## 2019-01-24 RX ADMIN — LORAZEPAM 0.5 MG: 0.5 TABLET ORAL at 22:55

## 2019-01-24 RX ADMIN — POTASSIUM CHLORIDE 10 MEQ: 750 CAPSULE, EXTENDED RELEASE ORAL at 00:09

## 2019-01-24 RX ADMIN — APIXABAN 5 MG: 5 TABLET, FILM COATED ORAL at 20:22

## 2019-01-24 RX ADMIN — LOSARTAN POTASSIUM 25 MG: 25 TABLET ORAL at 20:22

## 2019-01-24 RX ADMIN — LORAZEPAM 0.5 MG: 0.5 TABLET ORAL at 00:09

## 2019-01-24 RX ADMIN — APIXABAN 5 MG: 5 TABLET, FILM COATED ORAL at 08:31

## 2019-01-24 RX ADMIN — Medication 1 TABLET: at 20:22

## 2019-01-24 RX ADMIN — ATORVASTATIN CALCIUM 40 MG: 40 TABLET, FILM COATED ORAL at 20:22

## 2019-01-24 RX ADMIN — Medication 10 ML: at 00:10

## 2019-01-24 RX ADMIN — APIXABAN 5 MG: 5 TABLET, FILM COATED ORAL at 00:09

## 2019-01-24 RX ADMIN — Medication 10 ML: at 08:32

## 2019-01-24 RX ADMIN — POTASSIUM CHLORIDE 10 MEQ: 750 CAPSULE, EXTENDED RELEASE ORAL at 20:29

## 2019-01-24 RX ADMIN — METOPROLOL SUCCINATE 100 MG: 100 TABLET, EXTENDED RELEASE ORAL at 08:31

## 2019-01-24 RX ADMIN — FUROSEMIDE 40 MG: 40 TABLET ORAL at 08:31

## 2019-01-24 RX ADMIN — Medication 10 ML: at 20:32

## 2019-01-24 ASSESSMENT — ENCOUNTER SYMPTOMS
COUGH: 1
NAUSEA: 0
ABDOMINAL PAIN: 0
RHINORRHEA: 0
EYE PAIN: 0
SINUS PAIN: 0
DIARRHEA: 0
VOMITING: 0
CHEST TIGHTNESS: 0
EYE DISCHARGE: 0
BACK PAIN: 0
TROUBLE SWALLOWING: 0
COLOR CHANGE: 0
WHEEZING: 0
SHORTNESS OF BREATH: 1
SORE THROAT: 0
BLOOD IN STOOL: 0
CONSTIPATION: 0
EYE ITCHING: 0

## 2019-01-24 ASSESSMENT — PAIN SCALES - GENERAL
PAINLEVEL_OUTOF10: 0

## 2019-01-25 PROBLEM — I50.33 ACUTE ON CHRONIC DIASTOLIC CHF (CONGESTIVE HEART FAILURE) (HCC): Status: ACTIVE | Noted: 2018-07-03

## 2019-01-25 LAB
ALBUMIN SERPL-MCNC: 3.3 G/DL (ref 3.5–5.2)
ALBUMIN/GLOBULIN RATIO: ABNORMAL (ref 1–2.5)
ALP BLD-CCNC: 173 U/L (ref 35–104)
ALT SERPL-CCNC: 22 U/L (ref 5–33)
ANION GAP SERPL CALCULATED.3IONS-SCNC: 10 MMOL/L (ref 9–17)
AST SERPL-CCNC: 37 U/L
BILIRUB SERPL-MCNC: 1.28 MG/DL (ref 0.3–1.2)
BUN BLDV-MCNC: 19 MG/DL (ref 8–23)
BUN/CREAT BLD: ABNORMAL (ref 9–20)
CALCIUM SERPL-MCNC: 9.8 MG/DL (ref 8.6–10.4)
CHLORIDE BLD-SCNC: 100 MMOL/L (ref 98–107)
CO2: 28 MMOL/L (ref 20–31)
CREAT SERPL-MCNC: 0.67 MG/DL (ref 0.5–0.9)
GFR AFRICAN AMERICAN: >60 ML/MIN
GFR NON-AFRICAN AMERICAN: >60 ML/MIN
GFR SERPL CREATININE-BSD FRML MDRD: ABNORMAL ML/MIN/{1.73_M2}
GFR SERPL CREATININE-BSD FRML MDRD: ABNORMAL ML/MIN/{1.73_M2}
GLUCOSE BLD-MCNC: 99 MG/DL (ref 70–99)
HCT VFR BLD CALC: 39.3 % (ref 36–46)
HEMOGLOBIN: 13.2 G/DL (ref 12–16)
MCH RBC QN AUTO: 30.1 PG (ref 26–34)
MCHC RBC AUTO-ENTMCNC: 33.7 G/DL (ref 31–37)
MCV RBC AUTO: 89.5 FL (ref 80–100)
NRBC AUTOMATED: ABNORMAL PER 100 WBC
PDW BLD-RTO: 19 % (ref 11.5–14.9)
PLATELET # BLD: 185 K/UL (ref 150–450)
PMV BLD AUTO: 9.2 FL (ref 6–12)
POTASSIUM SERPL-SCNC: 3.9 MMOL/L (ref 3.7–5.3)
RBC # BLD: 4.4 M/UL (ref 4–5.2)
SODIUM BLD-SCNC: 138 MMOL/L (ref 135–144)
TOTAL PROTEIN: 7 G/DL (ref 6.4–8.3)
WBC # BLD: 5.8 K/UL (ref 3.5–11)

## 2019-01-25 PROCEDURE — 2060000000 HC ICU INTERMEDIATE R&B

## 2019-01-25 PROCEDURE — 97166 OT EVAL MOD COMPLEX 45 MIN: CPT

## 2019-01-25 PROCEDURE — 99233 SBSQ HOSP IP/OBS HIGH 50: CPT | Performed by: FAMILY MEDICINE

## 2019-01-25 PROCEDURE — 6370000000 HC RX 637 (ALT 250 FOR IP): Performed by: INTERNAL MEDICINE

## 2019-01-25 PROCEDURE — 80053 COMPREHEN METABOLIC PANEL: CPT

## 2019-01-25 PROCEDURE — 85027 COMPLETE CBC AUTOMATED: CPT

## 2019-01-25 PROCEDURE — 97161 PT EVAL LOW COMPLEX 20 MIN: CPT

## 2019-01-25 PROCEDURE — 36415 COLL VENOUS BLD VENIPUNCTURE: CPT

## 2019-01-25 PROCEDURE — 6370000000 HC RX 637 (ALT 250 FOR IP): Performed by: FAMILY MEDICINE

## 2019-01-25 PROCEDURE — G8978 MOBILITY CURRENT STATUS: HCPCS

## 2019-01-25 PROCEDURE — 97530 THERAPEUTIC ACTIVITIES: CPT

## 2019-01-25 PROCEDURE — 2580000003 HC RX 258: Performed by: FAMILY MEDICINE

## 2019-01-25 PROCEDURE — G8979 MOBILITY GOAL STATUS: HCPCS

## 2019-01-25 RX ADMIN — Medication 1 TABLET: at 20:07

## 2019-01-25 RX ADMIN — MULTIPLE VITAMINS W/ MINERALS TAB 1 TABLET: TAB at 08:30

## 2019-01-25 RX ADMIN — ATORVASTATIN CALCIUM 40 MG: 40 TABLET, FILM COATED ORAL at 20:07

## 2019-01-25 RX ADMIN — METOPROLOL SUCCINATE 100 MG: 100 TABLET, EXTENDED RELEASE ORAL at 08:30

## 2019-01-25 RX ADMIN — Medication 10 ML: at 08:30

## 2019-01-25 RX ADMIN — POTASSIUM CHLORIDE 10 MEQ: 750 CAPSULE, EXTENDED RELEASE ORAL at 20:07

## 2019-01-25 RX ADMIN — Medication 10 ML: at 20:07

## 2019-01-25 RX ADMIN — FUROSEMIDE 40 MG: 40 TABLET ORAL at 06:00

## 2019-01-25 RX ADMIN — APIXABAN 2.5 MG: 2.5 TABLET, FILM COATED ORAL at 20:07

## 2019-01-25 RX ADMIN — LORAZEPAM 0.5 MG: 0.5 TABLET ORAL at 20:07

## 2019-01-25 RX ADMIN — PANTOPRAZOLE SODIUM 40 MG: 40 TABLET, DELAYED RELEASE ORAL at 08:30

## 2019-01-25 RX ADMIN — POTASSIUM CHLORIDE 10 MEQ: 750 CAPSULE, EXTENDED RELEASE ORAL at 08:30

## 2019-01-25 RX ADMIN — CITALOPRAM HYDROBROMIDE 20 MG: 20 TABLET ORAL at 20:07

## 2019-01-25 RX ADMIN — LOSARTAN POTASSIUM 25 MG: 25 TABLET ORAL at 20:07

## 2019-01-26 ENCOUNTER — APPOINTMENT (OUTPATIENT)
Dept: GENERAL RADIOLOGY | Age: 79
DRG: 291 | End: 2019-01-26
Payer: MEDICARE

## 2019-01-26 LAB
ALBUMIN SERPL-MCNC: 3.2 G/DL (ref 3.5–5.2)
ALBUMIN/GLOBULIN RATIO: ABNORMAL (ref 1–2.5)
ALP BLD-CCNC: 161 U/L (ref 35–104)
ALT SERPL-CCNC: 20 U/L (ref 5–33)
ANION GAP SERPL CALCULATED.3IONS-SCNC: 9 MMOL/L (ref 9–17)
AST SERPL-CCNC: 34 U/L
BILIRUB SERPL-MCNC: 1.29 MG/DL (ref 0.3–1.2)
BUN BLDV-MCNC: 20 MG/DL (ref 8–23)
BUN/CREAT BLD: ABNORMAL (ref 9–20)
CALCIUM SERPL-MCNC: 9.5 MG/DL (ref 8.6–10.4)
CHLORIDE BLD-SCNC: 104 MMOL/L (ref 98–107)
CO2: 31 MMOL/L (ref 20–31)
CREAT SERPL-MCNC: 0.64 MG/DL (ref 0.5–0.9)
GFR AFRICAN AMERICAN: >60 ML/MIN
GFR NON-AFRICAN AMERICAN: >60 ML/MIN
GFR SERPL CREATININE-BSD FRML MDRD: ABNORMAL ML/MIN/{1.73_M2}
GFR SERPL CREATININE-BSD FRML MDRD: ABNORMAL ML/MIN/{1.73_M2}
GLUCOSE BLD-MCNC: 94 MG/DL (ref 70–99)
HCT VFR BLD CALC: 38.6 % (ref 36–46)
HEMOGLOBIN: 12.7 G/DL (ref 12–16)
MCH RBC QN AUTO: 29.9 PG (ref 26–34)
MCHC RBC AUTO-ENTMCNC: 33 G/DL (ref 31–37)
MCV RBC AUTO: 90.6 FL (ref 80–100)
NRBC AUTOMATED: ABNORMAL PER 100 WBC
PDW BLD-RTO: 19.2 % (ref 11.5–14.9)
PLATELET # BLD: 193 K/UL (ref 150–450)
PMV BLD AUTO: 10.2 FL (ref 6–12)
POTASSIUM SERPL-SCNC: 4 MMOL/L (ref 3.7–5.3)
RBC # BLD: 4.27 M/UL (ref 4–5.2)
SODIUM BLD-SCNC: 144 MMOL/L (ref 135–144)
TOTAL PROTEIN: 6.9 G/DL (ref 6.4–8.3)
WBC # BLD: 5.6 K/UL (ref 3.5–11)

## 2019-01-26 PROCEDURE — 36415 COLL VENOUS BLD VENIPUNCTURE: CPT

## 2019-01-26 PROCEDURE — 2580000003 HC RX 258: Performed by: FAMILY MEDICINE

## 2019-01-26 PROCEDURE — 2060000000 HC ICU INTERMEDIATE R&B

## 2019-01-26 PROCEDURE — 71045 X-RAY EXAM CHEST 1 VIEW: CPT

## 2019-01-26 PROCEDURE — 2700000000 HC OXYGEN THERAPY PER DAY

## 2019-01-26 PROCEDURE — 85027 COMPLETE CBC AUTOMATED: CPT

## 2019-01-26 PROCEDURE — 6360000002 HC RX W HCPCS: Performed by: INTERNAL MEDICINE

## 2019-01-26 PROCEDURE — 6370000000 HC RX 637 (ALT 250 FOR IP): Performed by: FAMILY MEDICINE

## 2019-01-26 PROCEDURE — 6370000000 HC RX 637 (ALT 250 FOR IP): Performed by: INTERNAL MEDICINE

## 2019-01-26 PROCEDURE — 94762 N-INVAS EAR/PLS OXIMTRY CONT: CPT

## 2019-01-26 PROCEDURE — 99232 SBSQ HOSP IP/OBS MODERATE 35: CPT | Performed by: FAMILY MEDICINE

## 2019-01-26 PROCEDURE — 80053 COMPREHEN METABOLIC PANEL: CPT

## 2019-01-26 PROCEDURE — 94640 AIRWAY INHALATION TREATMENT: CPT

## 2019-01-26 RX ADMIN — MULTIPLE VITAMINS W/ MINERALS TAB 1 TABLET: TAB at 09:17

## 2019-01-26 RX ADMIN — METOPROLOL SUCCINATE 100 MG: 100 TABLET, EXTENDED RELEASE ORAL at 09:17

## 2019-01-26 RX ADMIN — POTASSIUM CHLORIDE 10 MEQ: 750 CAPSULE, EXTENDED RELEASE ORAL at 20:33

## 2019-01-26 RX ADMIN — Medication 10 ML: at 20:33

## 2019-01-26 RX ADMIN — APIXABAN 2.5 MG: 2.5 TABLET, FILM COATED ORAL at 20:33

## 2019-01-26 RX ADMIN — FUROSEMIDE 40 MG: 40 TABLET ORAL at 09:17

## 2019-01-26 RX ADMIN — ATORVASTATIN CALCIUM 40 MG: 40 TABLET, FILM COATED ORAL at 20:33

## 2019-01-26 RX ADMIN — PANTOPRAZOLE SODIUM 40 MG: 40 TABLET, DELAYED RELEASE ORAL at 09:17

## 2019-01-26 RX ADMIN — LORAZEPAM 0.5 MG: 0.5 TABLET ORAL at 20:33

## 2019-01-26 RX ADMIN — Medication 1 TABLET: at 20:34

## 2019-01-26 RX ADMIN — APIXABAN 2.5 MG: 2.5 TABLET, FILM COATED ORAL at 09:17

## 2019-01-26 RX ADMIN — LOSARTAN POTASSIUM 25 MG: 25 TABLET ORAL at 20:34

## 2019-01-26 RX ADMIN — Medication 10 ML: at 09:19

## 2019-01-26 RX ADMIN — POTASSIUM CHLORIDE 10 MEQ: 750 CAPSULE, EXTENDED RELEASE ORAL at 09:17

## 2019-01-26 RX ADMIN — ALBUTEROL SULFATE 2.5 MG: 2.5 SOLUTION RESPIRATORY (INHALATION) at 15:05

## 2019-01-26 RX ADMIN — CITALOPRAM HYDROBROMIDE 20 MG: 20 TABLET ORAL at 20:33

## 2019-01-26 ASSESSMENT — PAIN SCALES - GENERAL
PAINLEVEL_OUTOF10: 0

## 2019-01-27 LAB
ALBUMIN SERPL-MCNC: 3.4 G/DL (ref 3.5–5.2)
ALBUMIN/GLOBULIN RATIO: ABNORMAL (ref 1–2.5)
ALP BLD-CCNC: 180 U/L (ref 35–104)
ALT SERPL-CCNC: 22 U/L (ref 5–33)
ANION GAP SERPL CALCULATED.3IONS-SCNC: 11 MMOL/L (ref 9–17)
AST SERPL-CCNC: 37 U/L
BILIRUB SERPL-MCNC: 1.46 MG/DL (ref 0.3–1.2)
BUN BLDV-MCNC: 23 MG/DL (ref 8–23)
BUN/CREAT BLD: ABNORMAL (ref 9–20)
CALCIUM SERPL-MCNC: 10 MG/DL (ref 8.6–10.4)
CHLORIDE BLD-SCNC: 98 MMOL/L (ref 98–107)
CO2: 29 MMOL/L (ref 20–31)
CREAT SERPL-MCNC: 0.69 MG/DL (ref 0.5–0.9)
GFR AFRICAN AMERICAN: >60 ML/MIN
GFR NON-AFRICAN AMERICAN: >60 ML/MIN
GFR SERPL CREATININE-BSD FRML MDRD: ABNORMAL ML/MIN/{1.73_M2}
GFR SERPL CREATININE-BSD FRML MDRD: ABNORMAL ML/MIN/{1.73_M2}
GLUCOSE BLD-MCNC: 100 MG/DL (ref 70–99)
HCT VFR BLD CALC: 40.4 % (ref 36–46)
HEMOGLOBIN: 13.3 G/DL (ref 12–16)
MCH RBC QN AUTO: 29.7 PG (ref 26–34)
MCHC RBC AUTO-ENTMCNC: 32.8 G/DL (ref 31–37)
MCV RBC AUTO: 90.6 FL (ref 80–100)
NRBC AUTOMATED: ABNORMAL PER 100 WBC
PDW BLD-RTO: 18.9 % (ref 11.5–14.9)
PLATELET # BLD: 204 K/UL (ref 150–450)
PMV BLD AUTO: 10 FL (ref 6–12)
POTASSIUM SERPL-SCNC: 4.4 MMOL/L (ref 3.7–5.3)
RBC # BLD: 4.46 M/UL (ref 4–5.2)
SODIUM BLD-SCNC: 138 MMOL/L (ref 135–144)
TOTAL PROTEIN: 7.2 G/DL (ref 6.4–8.3)
WBC # BLD: 6.3 K/UL (ref 3.5–11)

## 2019-01-27 PROCEDURE — 2060000000 HC ICU INTERMEDIATE R&B

## 2019-01-27 PROCEDURE — 2580000003 HC RX 258: Performed by: FAMILY MEDICINE

## 2019-01-27 PROCEDURE — 6370000000 HC RX 637 (ALT 250 FOR IP): Performed by: FAMILY MEDICINE

## 2019-01-27 PROCEDURE — 36415 COLL VENOUS BLD VENIPUNCTURE: CPT

## 2019-01-27 PROCEDURE — 80053 COMPREHEN METABOLIC PANEL: CPT

## 2019-01-27 PROCEDURE — 99233 SBSQ HOSP IP/OBS HIGH 50: CPT | Performed by: FAMILY MEDICINE

## 2019-01-27 PROCEDURE — 6370000000 HC RX 637 (ALT 250 FOR IP): Performed by: INTERNAL MEDICINE

## 2019-01-27 PROCEDURE — 85027 COMPLETE CBC AUTOMATED: CPT

## 2019-01-27 PROCEDURE — 94762 N-INVAS EAR/PLS OXIMTRY CONT: CPT

## 2019-01-27 PROCEDURE — 6360000002 HC RX W HCPCS: Performed by: INTERNAL MEDICINE

## 2019-01-27 RX ORDER — FUROSEMIDE 10 MG/ML
40 INJECTION INTRAMUSCULAR; INTRAVENOUS ONCE
Status: COMPLETED | OUTPATIENT
Start: 2019-01-27 | End: 2019-01-27

## 2019-01-27 RX ORDER — FUROSEMIDE 10 MG/ML
40 INJECTION INTRAMUSCULAR; INTRAVENOUS 2 TIMES DAILY
Status: DISCONTINUED | OUTPATIENT
Start: 2019-01-28 | End: 2019-01-30

## 2019-01-27 RX ADMIN — POTASSIUM CHLORIDE 10 MEQ: 750 CAPSULE, EXTENDED RELEASE ORAL at 08:50

## 2019-01-27 RX ADMIN — Medication 1 TABLET: at 21:36

## 2019-01-27 RX ADMIN — APIXABAN 2.5 MG: 2.5 TABLET, FILM COATED ORAL at 21:36

## 2019-01-27 RX ADMIN — MULTIPLE VITAMINS W/ MINERALS TAB 1 TABLET: TAB at 08:50

## 2019-01-27 RX ADMIN — FUROSEMIDE 40 MG: 40 TABLET ORAL at 08:50

## 2019-01-27 RX ADMIN — CITALOPRAM HYDROBROMIDE 20 MG: 20 TABLET ORAL at 21:36

## 2019-01-27 RX ADMIN — PANTOPRAZOLE SODIUM 40 MG: 40 TABLET, DELAYED RELEASE ORAL at 05:12

## 2019-01-27 RX ADMIN — LORAZEPAM 0.5 MG: 0.5 TABLET ORAL at 21:36

## 2019-01-27 RX ADMIN — POTASSIUM CHLORIDE 10 MEQ: 750 CAPSULE, EXTENDED RELEASE ORAL at 21:36

## 2019-01-27 RX ADMIN — Medication 10 ML: at 15:18

## 2019-01-27 RX ADMIN — Medication 10 ML: at 21:36

## 2019-01-27 RX ADMIN — APIXABAN 2.5 MG: 2.5 TABLET, FILM COATED ORAL at 08:50

## 2019-01-27 RX ADMIN — FUROSEMIDE 40 MG: 10 INJECTION, SOLUTION INTRAMUSCULAR; INTRAVENOUS at 15:16

## 2019-01-27 RX ADMIN — LOSARTAN POTASSIUM 25 MG: 25 TABLET ORAL at 21:36

## 2019-01-27 RX ADMIN — METOPROLOL SUCCINATE 100 MG: 100 TABLET, EXTENDED RELEASE ORAL at 08:50

## 2019-01-27 ASSESSMENT — PAIN SCALES - GENERAL
PAINLEVEL_OUTOF10: 0
PAINLEVEL_OUTOF10: 1
PAINLEVEL_OUTOF10: 0

## 2019-01-28 LAB
ALBUMIN SERPL-MCNC: 3.2 G/DL (ref 3.5–5.2)
ALBUMIN/GLOBULIN RATIO: ABNORMAL (ref 1–2.5)
ALP BLD-CCNC: 175 U/L (ref 35–104)
ALT SERPL-CCNC: 20 U/L (ref 5–33)
ANION GAP SERPL CALCULATED.3IONS-SCNC: 10 MMOL/L (ref 9–17)
AST SERPL-CCNC: 33 U/L
BILIRUB SERPL-MCNC: 1.69 MG/DL (ref 0.3–1.2)
BUN BLDV-MCNC: 20 MG/DL (ref 8–23)
BUN/CREAT BLD: ABNORMAL (ref 9–20)
CALCIUM SERPL-MCNC: 9.8 MG/DL (ref 8.6–10.4)
CHLORIDE BLD-SCNC: 95 MMOL/L (ref 98–107)
CO2: 32 MMOL/L (ref 20–31)
CREAT SERPL-MCNC: 0.7 MG/DL (ref 0.5–0.9)
GFR AFRICAN AMERICAN: >60 ML/MIN
GFR NON-AFRICAN AMERICAN: >60 ML/MIN
GFR SERPL CREATININE-BSD FRML MDRD: ABNORMAL ML/MIN/{1.73_M2}
GFR SERPL CREATININE-BSD FRML MDRD: ABNORMAL ML/MIN/{1.73_M2}
GLUCOSE BLD-MCNC: 99 MG/DL (ref 70–99)
HCT VFR BLD CALC: 41.3 % (ref 36–46)
HEMOGLOBIN: 13.6 G/DL (ref 12–16)
MCH RBC QN AUTO: 29.8 PG (ref 26–34)
MCHC RBC AUTO-ENTMCNC: 32.8 G/DL (ref 31–37)
MCV RBC AUTO: 90.8 FL (ref 80–100)
NRBC AUTOMATED: ABNORMAL PER 100 WBC
PDW BLD-RTO: 18.8 % (ref 11.5–14.9)
PLATELET # BLD: 197 K/UL (ref 150–450)
PMV BLD AUTO: 9.7 FL (ref 6–12)
POTASSIUM SERPL-SCNC: 4 MMOL/L (ref 3.7–5.3)
RBC # BLD: 4.55 M/UL (ref 4–5.2)
SODIUM BLD-SCNC: 137 MMOL/L (ref 135–144)
TOTAL PROTEIN: 7 G/DL (ref 6.4–8.3)
WBC # BLD: 7.1 K/UL (ref 3.5–11)

## 2019-01-28 PROCEDURE — 6370000000 HC RX 637 (ALT 250 FOR IP): Performed by: INTERNAL MEDICINE

## 2019-01-28 PROCEDURE — 2060000000 HC ICU INTERMEDIATE R&B

## 2019-01-28 PROCEDURE — 2580000003 HC RX 258: Performed by: FAMILY MEDICINE

## 2019-01-28 PROCEDURE — 6370000000 HC RX 637 (ALT 250 FOR IP): Performed by: FAMILY MEDICINE

## 2019-01-28 PROCEDURE — 99232 SBSQ HOSP IP/OBS MODERATE 35: CPT | Performed by: FAMILY MEDICINE

## 2019-01-28 PROCEDURE — 6360000002 HC RX W HCPCS: Performed by: INTERNAL MEDICINE

## 2019-01-28 PROCEDURE — 80053 COMPREHEN METABOLIC PANEL: CPT

## 2019-01-28 PROCEDURE — 85027 COMPLETE CBC AUTOMATED: CPT

## 2019-01-28 PROCEDURE — 36415 COLL VENOUS BLD VENIPUNCTURE: CPT

## 2019-01-28 RX ADMIN — CITALOPRAM HYDROBROMIDE 20 MG: 20 TABLET ORAL at 19:29

## 2019-01-28 RX ADMIN — APIXABAN 2.5 MG: 2.5 TABLET, FILM COATED ORAL at 08:42

## 2019-01-28 RX ADMIN — PANTOPRAZOLE SODIUM 40 MG: 40 TABLET, DELAYED RELEASE ORAL at 06:50

## 2019-01-28 RX ADMIN — LOSARTAN POTASSIUM 25 MG: 25 TABLET ORAL at 19:29

## 2019-01-28 RX ADMIN — LORAZEPAM 0.5 MG: 0.5 TABLET ORAL at 19:29

## 2019-01-28 RX ADMIN — POTASSIUM CHLORIDE 10 MEQ: 750 CAPSULE, EXTENDED RELEASE ORAL at 19:29

## 2019-01-28 RX ADMIN — FUROSEMIDE 40 MG: 10 INJECTION, SOLUTION INTRAMUSCULAR; INTRAVENOUS at 18:55

## 2019-01-28 RX ADMIN — Medication 10 ML: at 08:43

## 2019-01-28 RX ADMIN — METOPROLOL SUCCINATE 100 MG: 100 TABLET, EXTENDED RELEASE ORAL at 08:42

## 2019-01-28 RX ADMIN — POTASSIUM CHLORIDE 10 MEQ: 750 CAPSULE, EXTENDED RELEASE ORAL at 08:43

## 2019-01-28 RX ADMIN — Medication 1 TABLET: at 19:29

## 2019-01-28 RX ADMIN — FUROSEMIDE 40 MG: 10 INJECTION, SOLUTION INTRAMUSCULAR; INTRAVENOUS at 08:42

## 2019-01-28 RX ADMIN — APIXABAN 2.5 MG: 2.5 TABLET, FILM COATED ORAL at 19:29

## 2019-01-28 RX ADMIN — Medication 10 ML: at 19:30

## 2019-01-28 RX ADMIN — MULTIPLE VITAMINS W/ MINERALS TAB 1 TABLET: TAB at 08:43

## 2019-01-28 ASSESSMENT — PAIN SCALES - GENERAL
PAINLEVEL_OUTOF10: 0
PAINLEVEL_OUTOF10: 0

## 2019-01-29 ENCOUNTER — APPOINTMENT (OUTPATIENT)
Dept: GENERAL RADIOLOGY | Age: 79
DRG: 291 | End: 2019-01-29
Payer: MEDICARE

## 2019-01-29 LAB
ALBUMIN SERPL-MCNC: 3 G/DL (ref 3.5–5.2)
ALBUMIN/GLOBULIN RATIO: ABNORMAL (ref 1–2.5)
ALP BLD-CCNC: 173 U/L (ref 35–104)
ALT SERPL-CCNC: 19 U/L (ref 5–33)
ANION GAP SERPL CALCULATED.3IONS-SCNC: 8 MMOL/L (ref 9–17)
AST SERPL-CCNC: 32 U/L
BILIRUB SERPL-MCNC: 1.46 MG/DL (ref 0.3–1.2)
BUN BLDV-MCNC: 23 MG/DL (ref 8–23)
BUN/CREAT BLD: ABNORMAL (ref 9–20)
CALCIUM SERPL-MCNC: 9.6 MG/DL (ref 8.6–10.4)
CHLORIDE BLD-SCNC: 94 MMOL/L (ref 98–107)
CO2: 33 MMOL/L (ref 20–31)
CREAT SERPL-MCNC: 0.63 MG/DL (ref 0.5–0.9)
GFR AFRICAN AMERICAN: >60 ML/MIN
GFR NON-AFRICAN AMERICAN: >60 ML/MIN
GFR SERPL CREATININE-BSD FRML MDRD: ABNORMAL ML/MIN/{1.73_M2}
GFR SERPL CREATININE-BSD FRML MDRD: ABNORMAL ML/MIN/{1.73_M2}
GLUCOSE BLD-MCNC: 102 MG/DL (ref 70–99)
HCT VFR BLD CALC: 38 % (ref 36–46)
HEMOGLOBIN: 12.5 G/DL (ref 12–16)
MCH RBC QN AUTO: 29.7 PG (ref 26–34)
MCHC RBC AUTO-ENTMCNC: 32.8 G/DL (ref 31–37)
MCV RBC AUTO: 90.5 FL (ref 80–100)
NRBC AUTOMATED: ABNORMAL PER 100 WBC
PDW BLD-RTO: 18.6 % (ref 11.5–14.9)
PLATELET # BLD: 187 K/UL (ref 150–450)
PMV BLD AUTO: 9.1 FL (ref 6–12)
POTASSIUM SERPL-SCNC: 3.8 MMOL/L (ref 3.7–5.3)
RBC # BLD: 4.2 M/UL (ref 4–5.2)
SODIUM BLD-SCNC: 135 MMOL/L (ref 135–144)
TOTAL PROTEIN: 6.7 G/DL (ref 6.4–8.3)
WBC # BLD: 6.5 K/UL (ref 3.5–11)

## 2019-01-29 PROCEDURE — 6360000002 HC RX W HCPCS: Performed by: INTERNAL MEDICINE

## 2019-01-29 PROCEDURE — 2580000003 HC RX 258: Performed by: FAMILY MEDICINE

## 2019-01-29 PROCEDURE — 6370000000 HC RX 637 (ALT 250 FOR IP): Performed by: INTERNAL MEDICINE

## 2019-01-29 PROCEDURE — 71045 X-RAY EXAM CHEST 1 VIEW: CPT

## 2019-01-29 PROCEDURE — 2060000000 HC ICU INTERMEDIATE R&B

## 2019-01-29 PROCEDURE — 36415 COLL VENOUS BLD VENIPUNCTURE: CPT

## 2019-01-29 PROCEDURE — 85027 COMPLETE CBC AUTOMATED: CPT

## 2019-01-29 PROCEDURE — 6370000000 HC RX 637 (ALT 250 FOR IP): Performed by: FAMILY MEDICINE

## 2019-01-29 PROCEDURE — 99232 SBSQ HOSP IP/OBS MODERATE 35: CPT | Performed by: FAMILY MEDICINE

## 2019-01-29 PROCEDURE — 80053 COMPREHEN METABOLIC PANEL: CPT

## 2019-01-29 RX ADMIN — LOSARTAN POTASSIUM 25 MG: 25 TABLET ORAL at 20:45

## 2019-01-29 RX ADMIN — APIXABAN 2.5 MG: 2.5 TABLET, FILM COATED ORAL at 20:45

## 2019-01-29 RX ADMIN — POTASSIUM CHLORIDE 10 MEQ: 750 CAPSULE, EXTENDED RELEASE ORAL at 09:02

## 2019-01-29 RX ADMIN — Medication 10 ML: at 09:02

## 2019-01-29 RX ADMIN — APIXABAN 2.5 MG: 2.5 TABLET, FILM COATED ORAL at 09:02

## 2019-01-29 RX ADMIN — FUROSEMIDE 40 MG: 10 INJECTION, SOLUTION INTRAMUSCULAR; INTRAVENOUS at 09:02

## 2019-01-29 RX ADMIN — PANTOPRAZOLE SODIUM 40 MG: 40 TABLET, DELAYED RELEASE ORAL at 09:02

## 2019-01-29 RX ADMIN — CITALOPRAM HYDROBROMIDE 20 MG: 20 TABLET ORAL at 20:45

## 2019-01-29 RX ADMIN — Medication 1 TABLET: at 20:45

## 2019-01-29 RX ADMIN — POTASSIUM CHLORIDE 10 MEQ: 750 CAPSULE, EXTENDED RELEASE ORAL at 20:45

## 2019-01-29 RX ADMIN — MULTIPLE VITAMINS W/ MINERALS TAB 1 TABLET: TAB at 09:02

## 2019-01-29 RX ADMIN — FUROSEMIDE 40 MG: 10 INJECTION, SOLUTION INTRAMUSCULAR; INTRAVENOUS at 18:29

## 2019-01-29 RX ADMIN — LORAZEPAM 0.5 MG: 0.5 TABLET ORAL at 20:45

## 2019-01-29 RX ADMIN — Medication 10 ML: at 22:08

## 2019-01-29 RX ADMIN — METOPROLOL SUCCINATE 100 MG: 100 TABLET, EXTENDED RELEASE ORAL at 09:02

## 2019-01-30 VITALS
BODY MASS INDEX: 31.25 KG/M2 | WEIGHT: 194.45 LBS | HEIGHT: 66 IN | TEMPERATURE: 97.3 F | DIASTOLIC BLOOD PRESSURE: 59 MMHG | HEART RATE: 67 BPM | RESPIRATION RATE: 20 BRPM | SYSTOLIC BLOOD PRESSURE: 108 MMHG | OXYGEN SATURATION: 91 %

## 2019-01-30 LAB
ALBUMIN SERPL-MCNC: 3.2 G/DL (ref 3.5–5.2)
ALBUMIN/GLOBULIN RATIO: ABNORMAL (ref 1–2.5)
ALP BLD-CCNC: 189 U/L (ref 35–104)
ALT SERPL-CCNC: 20 U/L (ref 5–33)
ANION GAP SERPL CALCULATED.3IONS-SCNC: 10 MMOL/L (ref 9–17)
AST SERPL-CCNC: 37 U/L
BILIRUB SERPL-MCNC: 1.7 MG/DL (ref 0.3–1.2)
BUN BLDV-MCNC: 25 MG/DL (ref 8–23)
BUN/CREAT BLD: ABNORMAL (ref 9–20)
CALCIUM SERPL-MCNC: 9.8 MG/DL (ref 8.6–10.4)
CHLORIDE BLD-SCNC: 94 MMOL/L (ref 98–107)
CO2: 36 MMOL/L (ref 20–31)
CREAT SERPL-MCNC: 0.75 MG/DL (ref 0.5–0.9)
GFR AFRICAN AMERICAN: >60 ML/MIN
GFR NON-AFRICAN AMERICAN: >60 ML/MIN
GFR SERPL CREATININE-BSD FRML MDRD: ABNORMAL ML/MIN/{1.73_M2}
GFR SERPL CREATININE-BSD FRML MDRD: ABNORMAL ML/MIN/{1.73_M2}
GLUCOSE BLD-MCNC: 103 MG/DL (ref 70–99)
HCT VFR BLD CALC: 38.8 % (ref 36–46)
HEMOGLOBIN: 12.9 G/DL (ref 12–16)
MCH RBC QN AUTO: 29.9 PG (ref 26–34)
MCHC RBC AUTO-ENTMCNC: 33.1 G/DL (ref 31–37)
MCV RBC AUTO: 90.1 FL (ref 80–100)
NRBC AUTOMATED: ABNORMAL PER 100 WBC
PDW BLD-RTO: 18.4 % (ref 11.5–14.9)
PLATELET # BLD: 212 K/UL (ref 150–450)
PMV BLD AUTO: 10 FL (ref 6–12)
POTASSIUM SERPL-SCNC: 3.9 MMOL/L (ref 3.7–5.3)
RBC # BLD: 4.31 M/UL (ref 4–5.2)
SODIUM BLD-SCNC: 140 MMOL/L (ref 135–144)
TOTAL PROTEIN: 7.2 G/DL (ref 6.4–8.3)
WBC # BLD: 6.3 K/UL (ref 3.5–11)

## 2019-01-30 PROCEDURE — 6370000000 HC RX 637 (ALT 250 FOR IP): Performed by: FAMILY MEDICINE

## 2019-01-30 PROCEDURE — 97116 GAIT TRAINING THERAPY: CPT

## 2019-01-30 PROCEDURE — 6370000000 HC RX 637 (ALT 250 FOR IP): Performed by: INTERNAL MEDICINE

## 2019-01-30 PROCEDURE — 85027 COMPLETE CBC AUTOMATED: CPT

## 2019-01-30 PROCEDURE — 36415 COLL VENOUS BLD VENIPUNCTURE: CPT

## 2019-01-30 PROCEDURE — 99239 HOSP IP/OBS DSCHRG MGMT >30: CPT | Performed by: FAMILY MEDICINE

## 2019-01-30 PROCEDURE — 97535 SELF CARE MNGMENT TRAINING: CPT

## 2019-01-30 PROCEDURE — 2580000003 HC RX 258: Performed by: FAMILY MEDICINE

## 2019-01-30 PROCEDURE — 97110 THERAPEUTIC EXERCISES: CPT

## 2019-01-30 PROCEDURE — 6360000002 HC RX W HCPCS: Performed by: INTERNAL MEDICINE

## 2019-01-30 PROCEDURE — 80053 COMPREHEN METABOLIC PANEL: CPT

## 2019-01-30 RX ORDER — FUROSEMIDE 40 MG/1
40 TABLET ORAL 2 TIMES DAILY
Status: DISCONTINUED | OUTPATIENT
Start: 2019-01-30 | End: 2019-01-30 | Stop reason: HOSPADM

## 2019-01-30 RX ORDER — FUROSEMIDE 40 MG/1
40 TABLET ORAL 2 TIMES DAILY
Qty: 60 TABLET | Refills: 3 | Status: SHIPPED | OUTPATIENT
Start: 2019-01-30 | End: 2019-04-04 | Stop reason: DRUGHIGH

## 2019-01-30 RX ORDER — CITALOPRAM 20 MG/1
20 TABLET ORAL NIGHTLY
Qty: 30 TABLET | Refills: 3 | Status: SHIPPED | OUTPATIENT
Start: 2019-01-30 | End: 2019-01-30 | Stop reason: HOSPADM

## 2019-01-30 RX ADMIN — PANTOPRAZOLE SODIUM 40 MG: 40 TABLET, DELAYED RELEASE ORAL at 05:42

## 2019-01-30 RX ADMIN — MULTIPLE VITAMINS W/ MINERALS TAB 1 TABLET: TAB at 10:05

## 2019-01-30 RX ADMIN — METOPROLOL SUCCINATE 100 MG: 100 TABLET, EXTENDED RELEASE ORAL at 10:05

## 2019-01-30 RX ADMIN — APIXABAN 2.5 MG: 2.5 TABLET, FILM COATED ORAL at 10:06

## 2019-01-30 RX ADMIN — Medication 10 ML: at 10:07

## 2019-01-30 RX ADMIN — FUROSEMIDE 40 MG: 10 INJECTION, SOLUTION INTRAMUSCULAR; INTRAVENOUS at 10:07

## 2019-01-30 RX ADMIN — POTASSIUM CHLORIDE 10 MEQ: 750 CAPSULE, EXTENDED RELEASE ORAL at 10:05

## 2019-01-30 ASSESSMENT — PAIN SCALES - GENERAL: PAINLEVEL_OUTOF10: 0

## 2019-01-31 ENCOUNTER — CARE COORDINATION (OUTPATIENT)
Dept: CASE MANAGEMENT | Age: 79
End: 2019-01-31

## 2019-01-31 DIAGNOSIS — R09.02 HYPOXIA: Primary | ICD-10-CM

## 2019-01-31 PROCEDURE — 1111F DSCHRG MED/CURRENT MED MERGE: CPT

## 2019-02-07 ENCOUNTER — CARE COORDINATION (OUTPATIENT)
Dept: CASE MANAGEMENT | Age: 79
End: 2019-02-07

## 2019-02-08 ENCOUNTER — CARE COORDINATION (OUTPATIENT)
Dept: CASE MANAGEMENT | Age: 79
End: 2019-02-08

## 2019-02-11 ENCOUNTER — CARE COORDINATION (OUTPATIENT)
Dept: CARE COORDINATION | Age: 79
End: 2019-02-11

## 2019-02-13 ENCOUNTER — CARE COORDINATION (OUTPATIENT)
Dept: CASE MANAGEMENT | Age: 79
End: 2019-02-13

## 2019-02-19 ENCOUNTER — OFFICE VISIT (OUTPATIENT)
Dept: PULMONOLOGY | Age: 79
End: 2019-02-19
Payer: MEDICARE

## 2019-02-19 VITALS
RESPIRATION RATE: 12 BRPM | SYSTOLIC BLOOD PRESSURE: 103 MMHG | DIASTOLIC BLOOD PRESSURE: 66 MMHG | HEART RATE: 73 BPM | HEIGHT: 66 IN | OXYGEN SATURATION: 89 % | WEIGHT: 189 LBS | BODY MASS INDEX: 30.37 KG/M2

## 2019-02-19 DIAGNOSIS — Z99.89 OSA ON CPAP: Primary | ICD-10-CM

## 2019-02-19 DIAGNOSIS — J84.112 UIP (USUAL INTERSTITIAL PNEUMONITIS) (HCC): ICD-10-CM

## 2019-02-19 DIAGNOSIS — I27.20 PULMONARY HYPERTENSION (HCC): ICD-10-CM

## 2019-02-19 DIAGNOSIS — G47.33 OSA ON CPAP: Primary | ICD-10-CM

## 2019-02-19 DIAGNOSIS — J96.11 CHRONIC HYPOXEMIC RESPIRATORY FAILURE (HCC): ICD-10-CM

## 2019-02-19 DIAGNOSIS — J84.112 IPF (IDIOPATHIC PULMONARY FIBROSIS) (HCC): ICD-10-CM

## 2019-02-19 DIAGNOSIS — I50.32 CHRONIC DIASTOLIC CHF (CONGESTIVE HEART FAILURE) (HCC): ICD-10-CM

## 2019-02-19 PROCEDURE — 1036F TOBACCO NON-USER: CPT | Performed by: NURSE PRACTITIONER

## 2019-02-19 PROCEDURE — G8482 FLU IMMUNIZE ORDER/ADMIN: HCPCS | Performed by: NURSE PRACTITIONER

## 2019-02-19 PROCEDURE — G8598 ASA/ANTIPLAT THER USED: HCPCS | Performed by: NURSE PRACTITIONER

## 2019-02-19 PROCEDURE — 99214 OFFICE O/P EST MOD 30 MIN: CPT | Performed by: NURSE PRACTITIONER

## 2019-02-19 PROCEDURE — 1090F PRES/ABSN URINE INCON ASSESS: CPT | Performed by: NURSE PRACTITIONER

## 2019-02-19 PROCEDURE — 4040F PNEUMOC VAC/ADMIN/RCVD: CPT | Performed by: NURSE PRACTITIONER

## 2019-02-19 PROCEDURE — 1123F ACP DISCUSS/DSCN MKR DOCD: CPT | Performed by: NURSE PRACTITIONER

## 2019-02-19 PROCEDURE — 1101F PT FALLS ASSESS-DOCD LE1/YR: CPT | Performed by: NURSE PRACTITIONER

## 2019-02-19 PROCEDURE — G8427 DOCREV CUR MEDS BY ELIG CLIN: HCPCS | Performed by: NURSE PRACTITIONER

## 2019-02-19 PROCEDURE — G8399 PT W/DXA RESULTS DOCUMENT: HCPCS | Performed by: NURSE PRACTITIONER

## 2019-02-19 PROCEDURE — G8417 CALC BMI ABV UP PARAM F/U: HCPCS | Performed by: NURSE PRACTITIONER

## 2019-02-19 PROCEDURE — 1111F DSCHRG MED/CURRENT MED MERGE: CPT | Performed by: NURSE PRACTITIONER

## 2019-02-19 ASSESSMENT — ENCOUNTER SYMPTOMS
EYES NEGATIVE: 1
GASTROINTESTINAL NEGATIVE: 1
ALLERGIC/IMMUNOLOGIC NEGATIVE: 1

## 2019-03-06 ENCOUNTER — CARE COORDINATION (OUTPATIENT)
Dept: CARE COORDINATION | Age: 79
End: 2019-03-06

## 2019-03-06 RX ORDER — LOSARTAN POTASSIUM AND HYDROCHLOROTHIAZIDE 12.5; 1 MG/1; MG/1
1 TABLET ORAL DAILY
COMMUNITY
End: 2019-04-04

## 2019-03-18 ENCOUNTER — OFFICE VISIT (OUTPATIENT)
Dept: PULMONOLOGY | Age: 79
End: 2019-03-18
Payer: MEDICARE

## 2019-03-18 ENCOUNTER — CARE COORDINATION (OUTPATIENT)
Dept: CARE COORDINATION | Age: 79
End: 2019-03-18

## 2019-03-18 VITALS
BODY MASS INDEX: 31.18 KG/M2 | OXYGEN SATURATION: 63 % | WEIGHT: 194 LBS | DIASTOLIC BLOOD PRESSURE: 67 MMHG | RESPIRATION RATE: 14 BRPM | HEIGHT: 66 IN | SYSTOLIC BLOOD PRESSURE: 95 MMHG

## 2019-03-18 DIAGNOSIS — G47.33 OSA ON CPAP: ICD-10-CM

## 2019-03-18 DIAGNOSIS — J84.112 UIP (USUAL INTERSTITIAL PNEUMONITIS) (HCC): ICD-10-CM

## 2019-03-18 DIAGNOSIS — I50.32 CHRONIC DIASTOLIC CHF (CONGESTIVE HEART FAILURE) (HCC): ICD-10-CM

## 2019-03-18 DIAGNOSIS — J96.11 CHRONIC HYPOXEMIC RESPIRATORY FAILURE (HCC): ICD-10-CM

## 2019-03-18 DIAGNOSIS — Z99.89 OSA ON CPAP: ICD-10-CM

## 2019-03-18 DIAGNOSIS — J84.112 IPF (IDIOPATHIC PULMONARY FIBROSIS) (HCC): Primary | ICD-10-CM

## 2019-03-18 DIAGNOSIS — I27.20 PULMONARY HYPERTENSION (HCC): ICD-10-CM

## 2019-03-18 PROCEDURE — G8399 PT W/DXA RESULTS DOCUMENT: HCPCS | Performed by: INTERNAL MEDICINE

## 2019-03-18 PROCEDURE — 1123F ACP DISCUSS/DSCN MKR DOCD: CPT | Performed by: INTERNAL MEDICINE

## 2019-03-18 PROCEDURE — G8482 FLU IMMUNIZE ORDER/ADMIN: HCPCS | Performed by: INTERNAL MEDICINE

## 2019-03-18 PROCEDURE — 1101F PT FALLS ASSESS-DOCD LE1/YR: CPT | Performed by: INTERNAL MEDICINE

## 2019-03-18 PROCEDURE — G8417 CALC BMI ABV UP PARAM F/U: HCPCS | Performed by: INTERNAL MEDICINE

## 2019-03-18 PROCEDURE — G8598 ASA/ANTIPLAT THER USED: HCPCS | Performed by: INTERNAL MEDICINE

## 2019-03-18 PROCEDURE — 1090F PRES/ABSN URINE INCON ASSESS: CPT | Performed by: INTERNAL MEDICINE

## 2019-03-18 PROCEDURE — 4040F PNEUMOC VAC/ADMIN/RCVD: CPT | Performed by: INTERNAL MEDICINE

## 2019-03-18 PROCEDURE — 99213 OFFICE O/P EST LOW 20 MIN: CPT | Performed by: INTERNAL MEDICINE

## 2019-03-18 PROCEDURE — G8427 DOCREV CUR MEDS BY ELIG CLIN: HCPCS | Performed by: INTERNAL MEDICINE

## 2019-03-18 PROCEDURE — 1036F TOBACCO NON-USER: CPT | Performed by: INTERNAL MEDICINE

## 2019-03-19 ASSESSMENT — ENCOUNTER SYMPTOMS
SHORTNESS OF BREATH: 1
GASTROINTESTINAL NEGATIVE: 1
COUGH: 1
EYES NEGATIVE: 1

## 2019-03-26 ENCOUNTER — CARE COORDINATION (OUTPATIENT)
Dept: CARE COORDINATION | Age: 79
End: 2019-03-26

## 2019-04-04 ENCOUNTER — HOSPITAL ENCOUNTER (INPATIENT)
Age: 79
LOS: 15 days | Discharge: NURSING, REHAB OR LONG-TERM CARE | DRG: 291 | End: 2019-04-19
Attending: EMERGENCY MEDICINE | Admitting: FAMILY MEDICINE
Payer: MEDICARE

## 2019-04-04 ENCOUNTER — APPOINTMENT (OUTPATIENT)
Dept: GENERAL RADIOLOGY | Age: 79
DRG: 291 | End: 2019-04-04
Payer: MEDICARE

## 2019-04-04 ENCOUNTER — CARE COORDINATION (OUTPATIENT)
Dept: CARE COORDINATION | Age: 79
End: 2019-04-04

## 2019-04-04 DIAGNOSIS — I50.9 CONGESTIVE HEART FAILURE, UNSPECIFIED HF CHRONICITY, UNSPECIFIED HEART FAILURE TYPE (HCC): Primary | ICD-10-CM

## 2019-04-04 PROBLEM — I50.33 ACUTE ON CHRONIC DIASTOLIC CHF (CONGESTIVE HEART FAILURE) (HCC): Status: ACTIVE | Noted: 2019-04-04

## 2019-04-04 LAB
ABSOLUTE EOS #: 0 K/UL (ref 0–0.4)
ABSOLUTE IMMATURE GRANULOCYTE: ABNORMAL K/UL (ref 0–0.3)
ABSOLUTE LYMPH #: 0.56 K/UL (ref 1–4.8)
ABSOLUTE MONO #: 0.56 K/UL (ref 0.1–1.3)
ANION GAP SERPL CALCULATED.3IONS-SCNC: 13 MMOL/L (ref 9–17)
BASOPHILS # BLD: 1 % (ref 0–2)
BASOPHILS ABSOLUTE: 0.06 K/UL (ref 0–0.2)
BNP INTERPRETATION: ABNORMAL
BUN BLDV-MCNC: 22 MG/DL (ref 8–23)
BUN/CREAT BLD: ABNORMAL (ref 9–20)
CALCIUM SERPL-MCNC: 9.2 MG/DL (ref 8.6–10.4)
CHLORIDE BLD-SCNC: 95 MMOL/L (ref 98–107)
CO2: 29 MMOL/L (ref 20–31)
CREAT SERPL-MCNC: 0.97 MG/DL (ref 0.5–0.9)
DIFFERENTIAL TYPE: ABNORMAL
EOSINOPHILS RELATIVE PERCENT: 0 % (ref 0–4)
GFR AFRICAN AMERICAN: >60 ML/MIN
GFR NON-AFRICAN AMERICAN: 55 ML/MIN
GFR SERPL CREATININE-BSD FRML MDRD: ABNORMAL ML/MIN/{1.73_M2}
GFR SERPL CREATININE-BSD FRML MDRD: ABNORMAL ML/MIN/{1.73_M2}
GLUCOSE BLD-MCNC: 133 MG/DL (ref 70–99)
HCT VFR BLD CALC: 46.3 % (ref 36–46)
HEMOGLOBIN: 14.8 G/DL (ref 12–16)
IMMATURE GRANULOCYTES: ABNORMAL %
LYMPHOCYTES # BLD: 9 % (ref 24–44)
MCH RBC QN AUTO: 29.1 PG (ref 26–34)
MCHC RBC AUTO-ENTMCNC: 31.9 G/DL (ref 31–37)
MCV RBC AUTO: 91 FL (ref 80–100)
MONOCYTES # BLD: 9 % (ref 1–7)
MORPHOLOGY: ABNORMAL
NRBC AUTOMATED: ABNORMAL PER 100 WBC
PDW BLD-RTO: 21.2 % (ref 11.5–14.9)
PLATELET # BLD: 212 K/UL (ref 150–450)
PLATELET ESTIMATE: ABNORMAL
PMV BLD AUTO: 9.8 FL (ref 6–12)
POTASSIUM SERPL-SCNC: 3.6 MMOL/L (ref 3.7–5.3)
PRO-BNP: 7640 PG/ML
RBC # BLD: 5.08 M/UL (ref 4–5.2)
RBC # BLD: ABNORMAL 10*6/UL
SEG NEUTROPHILS: 81 % (ref 36–66)
SEGMENTED NEUTROPHILS ABSOLUTE COUNT: 5.02 K/UL (ref 1.3–9.1)
SODIUM BLD-SCNC: 137 MMOL/L (ref 135–144)
TROPONIN INTERP: ABNORMAL
TROPONIN INTERP: ABNORMAL
TROPONIN T: ABNORMAL NG/ML
TROPONIN T: ABNORMAL NG/ML
TROPONIN, HIGH SENSITIVITY: 25 NG/L (ref 0–14)
TROPONIN, HIGH SENSITIVITY: 28 NG/L (ref 0–14)
WBC # BLD: 6.2 K/UL (ref 3.5–11)
WBC # BLD: ABNORMAL 10*3/UL

## 2019-04-04 PROCEDURE — 6370000000 HC RX 637 (ALT 250 FOR IP): Performed by: STUDENT IN AN ORGANIZED HEALTH CARE EDUCATION/TRAINING PROGRAM

## 2019-04-04 PROCEDURE — 94660 CPAP INITIATION&MGMT: CPT

## 2019-04-04 PROCEDURE — 80048 BASIC METABOLIC PNL TOTAL CA: CPT

## 2019-04-04 PROCEDURE — 96375 TX/PRO/DX INJ NEW DRUG ADDON: CPT

## 2019-04-04 PROCEDURE — 2580000003 HC RX 258: Performed by: STUDENT IN AN ORGANIZED HEALTH CARE EDUCATION/TRAINING PROGRAM

## 2019-04-04 PROCEDURE — 2000000000 HC ICU R&B

## 2019-04-04 PROCEDURE — 85025 COMPLETE CBC W/AUTO DIFF WBC: CPT

## 2019-04-04 PROCEDURE — 93005 ELECTROCARDIOGRAM TRACING: CPT

## 2019-04-04 PROCEDURE — 83880 ASSAY OF NATRIURETIC PEPTIDE: CPT

## 2019-04-04 PROCEDURE — 84484 ASSAY OF TROPONIN QUANT: CPT

## 2019-04-04 PROCEDURE — 6360000002 HC RX W HCPCS: Performed by: STUDENT IN AN ORGANIZED HEALTH CARE EDUCATION/TRAINING PROGRAM

## 2019-04-04 PROCEDURE — 36415 COLL VENOUS BLD VENIPUNCTURE: CPT

## 2019-04-04 PROCEDURE — 96374 THER/PROPH/DIAG INJ IV PUSH: CPT

## 2019-04-04 PROCEDURE — 99285 EMERGENCY DEPT VISIT HI MDM: CPT

## 2019-04-04 PROCEDURE — 71045 X-RAY EXAM CHEST 1 VIEW: CPT

## 2019-04-04 PROCEDURE — 81001 URINALYSIS AUTO W/SCOPE: CPT

## 2019-04-04 RX ORDER — SODIUM CHLORIDE 0.9 % (FLUSH) 0.9 %
10 SYRINGE (ML) INJECTION EVERY 12 HOURS SCHEDULED
Status: DISCONTINUED | OUTPATIENT
Start: 2019-04-04 | End: 2019-04-19 | Stop reason: HOSPADM

## 2019-04-04 RX ORDER — ASPIRIN 81 MG/1
81 TABLET ORAL DAILY
Status: DISCONTINUED | OUTPATIENT
Start: 2019-04-04 | End: 2019-04-19 | Stop reason: HOSPADM

## 2019-04-04 RX ORDER — LORAZEPAM 0.5 MG/1
0.5 TABLET ORAL EVERY 8 HOURS PRN
Status: DISCONTINUED | OUTPATIENT
Start: 2019-04-04 | End: 2019-04-19 | Stop reason: HOSPADM

## 2019-04-04 RX ORDER — FUROSEMIDE 10 MG/ML
40 INJECTION INTRAMUSCULAR; INTRAVENOUS ONCE
Status: COMPLETED | OUTPATIENT
Start: 2019-04-04 | End: 2019-04-04

## 2019-04-04 RX ORDER — SODIUM CHLORIDE 0.9 % (FLUSH) 0.9 %
10 SYRINGE (ML) INJECTION PRN
Status: DISCONTINUED | OUTPATIENT
Start: 2019-04-04 | End: 2019-04-19 | Stop reason: HOSPADM

## 2019-04-04 RX ORDER — MORPHINE SULFATE 2 MG/ML
2 INJECTION, SOLUTION INTRAMUSCULAR; INTRAVENOUS
Status: DISCONTINUED | OUTPATIENT
Start: 2019-04-04 | End: 2019-04-19 | Stop reason: HOSPADM

## 2019-04-04 RX ORDER — PANTOPRAZOLE SODIUM 40 MG/1
40 TABLET, DELAYED RELEASE ORAL
Status: DISCONTINUED | OUTPATIENT
Start: 2019-04-05 | End: 2019-04-19 | Stop reason: HOSPADM

## 2019-04-04 RX ORDER — ACETAMINOPHEN 325 MG/1
650 TABLET ORAL EVERY 4 HOURS PRN
Status: DISCONTINUED | OUTPATIENT
Start: 2019-04-04 | End: 2019-04-19 | Stop reason: HOSPADM

## 2019-04-04 RX ORDER — ONDANSETRON 2 MG/ML
4 INJECTION INTRAMUSCULAR; INTRAVENOUS EVERY 8 HOURS PRN
Status: DISCONTINUED | OUTPATIENT
Start: 2019-04-04 | End: 2019-04-19 | Stop reason: HOSPADM

## 2019-04-04 RX ORDER — ATORVASTATIN CALCIUM 80 MG/1
80 TABLET, FILM COATED ORAL DAILY
Status: DISCONTINUED | OUTPATIENT
Start: 2019-04-04 | End: 2019-04-19 | Stop reason: HOSPADM

## 2019-04-04 RX ORDER — LORAZEPAM 0.5 MG/1
0.5 TABLET ORAL EVERY 8 HOURS PRN
COMMUNITY

## 2019-04-04 RX ORDER — ONDANSETRON 2 MG/ML
4 INJECTION INTRAMUSCULAR; INTRAVENOUS ONCE
Status: COMPLETED | OUTPATIENT
Start: 2019-04-04 | End: 2019-04-04

## 2019-04-04 RX ORDER — METOPROLOL SUCCINATE 100 MG/1
100 TABLET, EXTENDED RELEASE ORAL DAILY
Status: DISCONTINUED | OUTPATIENT
Start: 2019-04-04 | End: 2019-04-19 | Stop reason: HOSPADM

## 2019-04-04 RX ORDER — FUROSEMIDE 40 MG/1
40 TABLET ORAL 2 TIMES DAILY
COMMUNITY

## 2019-04-04 RX ORDER — MORPHINE SULFATE 4 MG/ML
4 INJECTION, SOLUTION INTRAMUSCULAR; INTRAVENOUS
Status: DISCONTINUED | OUTPATIENT
Start: 2019-04-04 | End: 2019-04-19 | Stop reason: HOSPADM

## 2019-04-04 RX ORDER — CITALOPRAM 20 MG/1
20 TABLET ORAL DAILY
Status: DISCONTINUED | OUTPATIENT
Start: 2019-04-04 | End: 2019-04-19 | Stop reason: HOSPADM

## 2019-04-04 RX ORDER — POTASSIUM CHLORIDE 750 MG/1
10 CAPSULE, EXTENDED RELEASE ORAL 2 TIMES DAILY
Status: DISCONTINUED | OUTPATIENT
Start: 2019-04-04 | End: 2019-04-05

## 2019-04-04 RX ORDER — FUROSEMIDE 40 MG/1
40 TABLET ORAL 2 TIMES DAILY
Status: DISCONTINUED | OUTPATIENT
Start: 2019-04-04 | End: 2019-04-05

## 2019-04-04 RX ADMIN — APIXABAN 2.5 MG: 2.5 TABLET, FILM COATED ORAL at 20:47

## 2019-04-04 RX ADMIN — Medication 10 ML: at 20:48

## 2019-04-04 RX ADMIN — POTASSIUM CHLORIDE 10 MEQ: 750 CAPSULE, EXTENDED RELEASE ORAL at 20:47

## 2019-04-04 RX ADMIN — FUROSEMIDE 40 MG: 10 INJECTION, SOLUTION INTRAMUSCULAR; INTRAVENOUS at 16:46

## 2019-04-04 RX ADMIN — CITALOPRAM HYDROBROMIDE 20 MG: 20 TABLET ORAL at 20:47

## 2019-04-04 RX ADMIN — Medication 1 TABLET: at 22:27

## 2019-04-04 RX ADMIN — FUROSEMIDE 40 MG: 40 TABLET ORAL at 20:47

## 2019-04-04 RX ADMIN — ASPIRIN 81 MG: 81 TABLET, COATED ORAL at 20:47

## 2019-04-04 RX ADMIN — ONDANSETRON 4 MG: 2 INJECTION INTRAMUSCULAR; INTRAVENOUS at 16:46

## 2019-04-04 RX ADMIN — ATORVASTATIN CALCIUM 80 MG: 80 TABLET, FILM COATED ORAL at 20:47

## 2019-04-04 ASSESSMENT — PAIN SCALES - GENERAL
PAINLEVEL_OUTOF10: 0
PAINLEVEL_OUTOF10: 0

## 2019-04-04 ASSESSMENT — ENCOUNTER SYMPTOMS: DYSPNEA ASSOCIATED WITH: REST

## 2019-04-04 NOTE — PROGRESS NOTES
Medication History completed:    New medications: lorazepam    Medications discontinued: losartan-hydrochlorothiazide    Changes to dosing: Eliquis was changed to the 5 mg tablets and the patient takes 1/2 tablet twice daily    Stated allergies: As listed    Other pertinent information: Medications confirmed with Marga and Kimberly.     Thank you,  Alonso Gregory, PharmD  [phone]

## 2019-04-04 NOTE — ED PROVIDER NOTES
[address] ED  eMERGENCY dEPARTMENT eNCOUnter   Attending Attestation     Pt Name: Magaly Davis  MRN: [id number]  Joselingfkatarina 1940  Date of evaluation: 4/4/19       Magaly Davis is a 78 y.o. female who presents with Shortness of Breath      History:   Patient is been complaining of some shortness of breath over the last week or more. Patient states that she's been swelling up a lot. Patient states that she has increased her Lasix at home on her own and it does not seem to be helping any. Patient is having no chest pain and no fevers. Exam: Vitals:   Vitals:    04/04/19 1630   BP: 121/67   Pulse: 72   Resp: 24   SpO2: (!) 81%   Weight: 210 lb (95.3 kg)   Height: 5' 6\" (1.676 m)     Heart regular rate rhythm no murmurs. Lungs are by basilar crackles otherwise clear with no respiratory distress. Patient has extremely swollen legs bilaterally equally with pitting edema    I performed a history and physical examination of the patient and discussed management with the resident. I reviewed the residents note and agree with the documented findings and plan of care. Any areas of disagreement are noted on the chart. I was personally present for the key portions of any procedures. I have documented in the chart those procedures where I was not present during the key portions. I have personally reviewed all images and agree with the resident's interpretation. I have reviewed the emergency nurses triage note. I agree with the chief complaint, past medical history, past surgical history, allergies, medications, social and family history as documented unless otherwise noted below. Documentation of the HPI, Physical Exam and Medical Decision Making performed by medical students or scribes is based on my personal performance of the HPI, PE and MDM.  I personally evaluated and examined the patient in conjunction with the APC and agree with the assessment, treatment plan, and disposition of the patient as recorded by

## 2019-04-04 NOTE — CARE COORDINATION
Ambulatory Care Coordination Note  4/4/2019  CM Risk Score: 6  Sabine Mortality Risk Score: 27    ACC: Zachary Acosta, ARA    Summary Note:   Pt's son Deena Roberts called and requested information for his Mom. He said that they are going to take her to the ED today because she is more SOB and her legs are really swollen again. He wanted to know if she could be directly admitted to the hospital and bypass the ED. RNCC told him only if she comes in for an OV. Deena Roberts decided that would be too much for Mitul Lyn, and he will just take her to the hospital. Deena Roberts then wanted to know about rehab after she is discharged; [facility] told him that she will be evaluated by PT a the hospital if she is admitted and PT would recommend rehab, then the hospital SW would assist them with choosing a rehab facility. Deena Rboerts was encouraged to call anytime with questions about rehab or home care; he said he was \"new to all of this. \" He said that Mitul Johnsonw was concerned that she hadn't had blood drawn since her hospitalization and asked her [place] [address] nurse to get an order for blood work to check her creatinine. No such note exists in Erum's chart. CC PLAN: F/U PC in one week to check on Erum's COPD/CHF exacerbation and ED/hospital visit. Care Coordination Interventions    Program Enrollment:  Complex Care  Referral from Primary Care Provider:  Yes  Suggested Interventions and Community Resources  No Identified Needs  Disease Specific Clinic:  Completed (Comment: CHF)  Home Health Services:  Completed (Comment: [facility])  Pulmonary Rehab:  Completed  Zone Management Tools:  Completed (Comment: CHF, COPD)         Goals Addressed                 This Visit's Progress     Conditions and Symptoms   On track     I will schedule office visits, as directed by my provider. I will keep my appointment or reschedule if I have to cancel. I will notify my provider of any barriers to my plan of care. I will follow my Zone Management tool to seek urgent or emergent care.   I will notify my provider of any symptoms that indicate a worsening of my condition. Barriers: lack of support and transportation issues   Plan for overcoming my barriers: Care Coordination, Family Support   Confidence: 8/10  Anticipated Goal Completion Date: 6/30/2019         Reduce Falls    On track     I will reduce my risk of falls by the following: Remove rugs or use non slip rugs  Install grab bars in bathroom  Use walking aids like cane or walker  Follow through on orders for PT  Carry my oxygen tubing so I don't trip on it. Barriers: lack of support and overwhelmed by complexity of regimen  Plan for overcoming my barriers: Care Coordination, Home Health-nursing and PT   Confidence: 8/10  Anticipated Goal Completion Date: 6/30/2019            Prior to Admission medications    Medication Sig Start Date End Date Taking? Authorizing Provider   nitroGLYCERIN (NITROSTAT) 0.4 MG SL tablet Place 1 tablet under the tongue as needed for Chest pain 3/6/19   Gabriela Sawant MD   losartan-hydrochlorothiazide (HYZAAR) 100-12.5 MG per tablet Take 1 tablet by mouth daily    Historical Provider, MD   apixaban (ELIQUIS) 2.5 MG TABS tablet Take 1 tablet by mouth 2 times daily 1/30/19   Gabriela Sawant MD   furosemide (LASIX) 40 MG tablet Take 1 tablet by mouth 2 times daily 1/30/19   Gabriela Sawant MD   aspirin 81 MG tablet Take 81 mg by mouth daily    Historical Provider, MD   Calcium Carb-Cholecalciferol 600-400 MG-UNIT CAPS Take 600 mg by mouth daily    Historical Provider, MD   Multiple Vitamins-Minerals (MULTIVITAMIN WITH MINERALS) tablet Take 1 tablet by mouth daily    Historical Provider, MD   metoprolol succinate (TOPROL XL) 100 MG extended release tablet Take 100 mg by mouth daily 11/6/18   Historical Provider, MD   omeprazole (PRILOSEC) 40 MG delayed release capsule TAKE 1 CAPSULE EVERY DAY 12/18/18   Gabriela Sawant MD   citalopram (CELEXA) 20 MG tablet TAKE 1 TABLET EVERY DAY 11/12/18   Gabriela Sawant MD   Misc. Devices MISC 1 each by Does not apply route continuous The patient needs extensions for the tubing also. She wears O2 as she ambulates around the house. 10 of these please. 7/24/18   GHULAM Cardoza CNP   Misc.  Devices (ROLLATOR) MISC 1 each by Does not apply route as needed (use for ambulation) 6/5/18   Amber Bond MD   OXYGEN Inhale 5 L into the lungs continuous     Historical Provider, MD   potassium chloride (MICRO-K) 10 MEQ extended release capsule Take 10 mEq by mouth 2 times daily     Historical Provider, MD   atorvastatin (LIPITOR) 40 MG tablet Take 80 mg by mouth daily     Historical Provider, MD       Future Appointments   Date Time Provider Carolann Norris   4/19/2019  1:15 PM [facility] CT RM 1 STCZ CT SCAN [facility] Radiolog   4/29/2019  3:40 PM GHULAM Parham CNP Resp Spec [facility]   6/10/2019  2:00 PM MD Harjinder Mckay   6/24/2019  4:00 PM Meg Morales, DO Resp Spec [facility]     ,   Congestive Heart Failure Assessment    Are you currently restricting fluids?:  2000cc  Do you understand a low sodium diet?:  Yes  Do you understand how to read food labels?:  Yes  How many restaurant meals do you eat per week?:  0  Do you salt your food before tasting it?:  No     Shortness of breath (worse than baseline), Swelling (worse than baseline) in hands, feet/legs or around abdomen      Symptoms:   CHF associated fatigue: Pos, CHF associated leg swelling: Pos, CHF associated shortness of breath: Pos, CHF associated weakness: Pos      Symptom course:  worsening      and   COPD Assessment    Does the patient understand envrionmental exposure?:  Yes  Is the patient able to verbalize Rescue vs. Long Acting medications?:  Yes  Does the patient have a nebulizer?:  Yes  Does the patient use a space with inhaled medications?:  No     Shortness of breath (worse than baseline)         Symptoms:   COPD associated wheezing: Pos      Symptom course:  worsening  Breathlessness:  rest  Increase use of rapid acting/rescue inhaled medications?:  Yes  Change in chronic cough?:  No/At Baseline  Change in sputum?:  No/At Baseline  Have you had a recent diagnosis of pneumonia either by PCP or at a hospital?:  No

## 2019-04-04 NOTE — PROGRESS NOTES
Patient arrived via stretcher from ER, w/e ER PCA and RT at bedside. Alert and oriented, VSS on BiPAp, no respiratory distress noted. Patient oriented to staff, unit, and POC. Attached to monitoring devices. 2 sons waiting in  Family room will escort them back when patient is fully assessed.

## 2019-04-04 NOTE — ED NOTES
Report given to [employer], RN from [place]. Report method in person   The following was reviewed with receiving RN:   Current vital signs:  /61   Pulse 69   Temp 97.5 °F (36.4 °C) (Axillary)   Resp 24   Ht 5' 6\" (1.676 m)   Wt 210 lb (95.3 kg)   LMP 11/01/1985   SpO2 92%   BMI 33.89 kg/m²                      Any medication (lasix and zofran) or safety alerts were reviewed. Any pending diagnostics (2nd trop) and notifications were also reviewed, as well as any safety concerns (SOB) or issues, abnormal labs (K), abnormal imaging (CXR), and abnormal assessment findings (crackles, pitting edema). Questions were answered. IV placement and location noted.            Le Burkett RN  04/04/19 9572

## 2019-04-04 NOTE — ED NOTES
PurWiandrzej external catheter inserted by Maria Esther Patel RN.      Lynnea Prader, RN  04/04/19 0667

## 2019-04-04 NOTE — ED PROVIDER NOTES
[address] ED  Emergency Department Encounter  Emergency Medicine Resident     Pt Name: Dino Potter  MRN: [id number]  [place] 1940  Date of evaluation: 4/4/19  PCP:  Holden Mujica MD    Chief Complaint     Chief Complaint   Patient presents with    Shortness of Breath     HISTORY OF PRESENT ILLNESS     Dino Potter is a 78 y.o. female who presented to the emergency department with concern for Respiratory distress for 4 days. Patient has an underlying history of CHF. The patient denies fevers, chills, nausea, vomiting and diarrhea. The patient is breathing easy with even and unlabored respirations. The patient reports taking her lasix as prescribed. REVIEW OF SYSTEMS       Constitutional: Denies recent fever, chills. Eyes: No visual changes. Neck: No midline neck pain but does complain of paraspinal muscle pain. Respiratory: + shortness of breath and dyspnea. Cardiac:  Denies recent chest pain. GI: denies any recent abdominal pain nausea or vomiting. Denies Blood in the stool or black tarry stools. : denies dysuria  Musculoskeletal: Denies focal weakness. Neurologic: denies headache or focal weakness. Skin:  Denies any rash. PAST MEDICAL/SURGICAL/FAMILY HISTORY     Past Medical Hx:    has a past medical history of Acute cystitis without hematuria, Anxiety, Atrial fibrillation ([address] Orantes's esophagus, Centrilobular emphysema ([address] Chronic diastolic CHF (congestive heart failure) ([address] Chronic hypoxemic respiratory failure ([address] GERD (gastroesophageal reflux disease), Heart disease, Hypertension, Hypotension, unspecified, Iatrogenic hypotension, Interstitial lung disease ([address] Lipids abnormal, Mixed hyperlipidemia, LIANET on CPAP, Pneumonia, Pulmonary embolus ([address] Pulmonary fibrosis ([address] Pulmonary hypertension ([address] S/P CABG x 4, and UIP (usual interstitial pneumonitis) ([address]     Past Surgical Hx:   has a past surgical history that includes Breast biopsy (); Cardiac surgery (; ); Cholecystectomy (); Cardioversion; and Bypass Graft (N/A). Social Hx:  Social History     Socioeconomic History    Marital status:       Spouse name: Not on file    Number of children: Not on file    Years of education: Not on file    Highest education level: Not on file   Occupational History    Not on file   Social Needs    Financial resource strain: Not on file    Food insecurity:     Worry: Not on file     Inability: Not on file    Transportation needs:     Medical: Not on file     Non-medical: Not on file   Tobacco Use    Smoking status: Former Smoker     Packs/day: 0.25     Years: 22.00     Pack years: 5.50     Types: Cigarettes     Start date: 1958     Last attempt to quit: 1980     Years since quittin.2    Smokeless tobacco: Never Used   Substance and Sexual Activity    Alcohol use: Yes     Comment: occ    Drug use: No    Sexual activity: Never   Lifestyle    Physical activity:     Days per week: Not on file     Minutes per session: Not on file    Stress: Not on file   Relationships    Social connections:     Talks on phone: Not on file     Gets together: Not on file     Attends Holiness service: Not on file     Active member of club or organization: Not on file     Attends meetings of clubs or organizations: Not on file     Relationship status: Not on file    Intimate partner violence:     Fear of current or ex partner: Not on file     Emotionally abused: Not on file     Physically abused: Not on file     Forced sexual activity: Not on file   Other Topics Concern    Not on file   Social History Narrative    Not on file       Family Hx:  No relevant family history is reported  Family History   Problem Relation Age of Onset    Heart Disease Father         congestive heart failure    Liver Disease Mother         liver failure    Heart Disease Brother         MI all 4 brothers   [place] Cancer Sister         colon, lung    Other Sister         lupus, kidney failure    High Blood Pressure Sister     High Cholesterol Sister     Stroke Sister     Heart Disease Sister         MI for both sisters    Cancer Sister         lung cancer both sisters    Stroke Maternal Grandmother      Allergies:    Penicillins    Home Medications:  Prior to Admission medications    Medication Sig Start Date End Date Taking? Authorizing Provider   nitroGLYCERIN (NITROSTAT) 0.4 MG SL tablet Place 1 tablet under the tongue as needed for Chest pain 3/6/19   Gay Perez MD   losartan-hydrochlorothiazide (HYZAAR) 100-12.5 MG per tablet Take 1 tablet by mouth daily    Historical Provider, MD   apixaban (ELIQUIS) 2.5 MG TABS tablet Take 1 tablet by mouth 2 times daily 1/30/19   Gay Perez MD   furosemide (LASIX) 40 MG tablet Take 1 tablet by mouth 2 times daily 1/30/19   Gay Perez MD   aspirin 81 MG tablet Take 81 mg by mouth daily    Historical Provider, MD   Calcium Carb-Cholecalciferol 600-400 MG-UNIT CAPS Take 600 mg by mouth daily    Historical Provider, MD   Multiple Vitamins-Minerals (MULTIVITAMIN WITH MINERALS) tablet Take 1 tablet by mouth daily    Historical Provider, MD   metoprolol succinate (TOPROL XL) 100 MG extended release tablet Take 100 mg by mouth daily 11/6/18   Historical Provider, MD   omeprazole (PRILOSEC) 40 MG delayed release capsule TAKE 1 CAPSULE EVERY DAY 12/18/18   Gya Perez MD   citalopram (CELEXA) 20 MG tablet TAKE 1 TABLET EVERY DAY 11/12/18   Gay Perez MD   Misc. Devices MISC 1 each by Does not apply route continuous The patient needs extensions for the tubing also. She wears O2 as she ambulates around the house. 10 of these please. 7/24/18   GHULAM Galvan - CNP   Misc.  Devices (ROLLATOR) MISC 1 each by Does not apply route as needed (use for ambulation) 6/5/18   Gay Perez MD   OXYGEN Inhale 5 L into the lungs continuous     Historical Provider, MD   potassium chloride (MICRO-K) 10 MEQ extended release capsule Take 10 mEq by mouth 2 times daily     Historical Provider, MD   atorvastatin (LIPITOR) 40 MG tablet Take 80 mg by mouth daily     Historical Provider, MD     PHYSICAL EXAM       /67   Pulse 72   Resp 24   Ht 5' 6\" (1.676 m)   Wt 210 lb (95.3 kg)   LMP 11/01/1985   SpO2 (!) 81%   BMI 33.89 kg/m²     GENERAL APPEARANCE: AxOx4, generally well-appearing. no acute distress. HEENT: Normocephalic and atraumatic. Mucus membranes moist. EOMI, clear conjunctiva, oropharynx clear. NECK: Supple without lymphadenopathy. No stiffness or restricted ROM. HEART: Normal rate and regular rhythm, normal S1/S1, no m/r/g   LUNGS: diffuse rales, decreased air movement  ABDOMEN: Soft, nontender, nondistended with good bowel sounds heard. BACK: No CVAT, no obvious deformity. EXTREMITIES: Without cyanosis, clubbing or edema. NEUROLOGICAL: Grossly nonfocal. Alert and oriented, moving all 4 extremities. CN not formally tested but appear grossly intact. SKIN: Warm and dry without any rash.     Diagnostic Results     LABS:  Results for orders placed or performed during the hospital encounter of 40/69/61   Basic Metabolic Panel   Result Value Ref Range    Glucose 133 (H) 70 - 99 mg/dL    BUN 22 8 - 23 mg/dL    CREATININE 0.97 (H) 0.50 - 0.90 mg/dL    Bun/Cre Ratio NOT REPORTED 9 - 20    Calcium 9.2 8.6 - 10.4 mg/dL    Sodium 137 135 - 144 mmol/L    Potassium 3.6 (L) 3.7 - 5.3 mmol/L    Chloride 95 (L) 98 - 107 mmol/L    CO2 29 20 - 31 mmol/L    Anion Gap 13 9 - 17 mmol/L    GFR Non-African American 55 (L) >60 mL/min    GFR African American >60 >60 mL/min    GFR Comment          GFR Staging NOT REPORTED    Brain Natriuretic Peptide   Result Value Ref Range    Pro-BNP 7,640 (H) <300 pg/mL    BNP Interpretation Pro-BNP Reference Range:    Urinalysis   Result Value Ref Range    Color, UA YELLOW YELLOW    Turbidity UA CLEAR CLEAR    Glucose, Ur NEGATIVE NEGATIVE    Bilirubin Urine NEGATIVE NEGATIVE

## 2019-04-04 NOTE — FLOWSHEET NOTE
provided listening presence, words of comfort and prayer out side the room (ED 7) with pt Erum's son as the staff was tending to her he did not seem to worried he said she has had this problem before and has confidence in this hospital.Chaplains remain available for spiritual or emotional support as needed.       04/04/19 1914   Encounter Summary   Services provided to: Family   Referral/Consult From: [facility] Family members   Continue Visiting   (4/4/2019)   Complexity of Encounter Moderate   Length of Encounter 15 minutes   Routine   Type Initial   Assessment Coping   Intervention Active listening;Nurtured hope;[place];Sustaining presence/ Ministry of presence   Outcome Expressed gratitude

## 2019-04-05 LAB
-: ABNORMAL
ABSOLUTE EOS #: 0.06 K/UL (ref 0–0.4)
ABSOLUTE IMMATURE GRANULOCYTE: ABNORMAL K/UL (ref 0–0.3)
ABSOLUTE LYMPH #: 0.52 K/UL (ref 1–4.8)
ABSOLUTE MONO #: 0.52 K/UL (ref 0.1–1.3)
AMORPHOUS: ABNORMAL
ANION GAP SERPL CALCULATED.3IONS-SCNC: 9 MMOL/L (ref 9–17)
BACTERIA: ABNORMAL
BASOPHILS # BLD: 0 % (ref 0–2)
BASOPHILS ABSOLUTE: 0 K/UL (ref 0–0.2)
BILIRUBIN URINE: NEGATIVE
BUN BLDV-MCNC: 24 MG/DL (ref 8–23)
BUN/CREAT BLD: ABNORMAL (ref 9–20)
CALCIUM SERPL-MCNC: 9.1 MG/DL (ref 8.6–10.4)
CASTS UA: ABNORMAL /LPF
CHLORIDE BLD-SCNC: 99 MMOL/L (ref 98–107)
CO2: 29 MMOL/L (ref 20–31)
COLOR: YELLOW
COMMENT UA: ABNORMAL
CREAT SERPL-MCNC: 0.92 MG/DL (ref 0.5–0.9)
CRYSTALS, UA: ABNORMAL /HPF
DIFFERENTIAL TYPE: ABNORMAL
EOSINOPHILS RELATIVE PERCENT: 1 % (ref 0–4)
EPITHELIAL CELLS UA: ABNORMAL /HPF
GFR AFRICAN AMERICAN: >60 ML/MIN
GFR NON-AFRICAN AMERICAN: 59 ML/MIN
GFR SERPL CREATININE-BSD FRML MDRD: ABNORMAL ML/MIN/{1.73_M2}
GFR SERPL CREATININE-BSD FRML MDRD: ABNORMAL ML/MIN/{1.73_M2}
GLUCOSE BLD-MCNC: 159 MG/DL (ref 70–99)
GLUCOSE URINE: NEGATIVE
HCT VFR BLD CALC: 41.4 % (ref 36–46)
HEMOGLOBIN: 13.1 G/DL (ref 12–16)
IMMATURE GRANULOCYTES: ABNORMAL %
KETONES, URINE: NEGATIVE
LEUKOCYTE ESTERASE, URINE: ABNORMAL
LYMPHOCYTES # BLD: 9 % (ref 24–44)
MCH RBC QN AUTO: 29.1 PG (ref 26–34)
MCHC RBC AUTO-ENTMCNC: 31.7 G/DL (ref 31–37)
MCV RBC AUTO: 91.8 FL (ref 80–100)
MONOCYTES # BLD: 9 % (ref 1–7)
MORPHOLOGY: ABNORMAL
MUCUS: ABNORMAL
NITRITE, URINE: NEGATIVE
NRBC AUTOMATED: ABNORMAL PER 100 WBC
OTHER OBSERVATIONS UA: ABNORMAL
PDW BLD-RTO: 20.6 % (ref 11.5–14.9)
PH UA: 5 (ref 5–8)
PLATELET # BLD: 170 K/UL (ref 150–450)
PLATELET ESTIMATE: ABNORMAL
PMV BLD AUTO: 8.3 FL (ref 6–12)
POTASSIUM SERPL-SCNC: 3.6 MMOL/L (ref 3.7–5.3)
PROTEIN UA: NEGATIVE
RBC # BLD: 4.52 M/UL (ref 4–5.2)
RBC # BLD: ABNORMAL 10*6/UL
RBC UA: ABNORMAL /HPF
RENAL EPITHELIAL, UA: ABNORMAL /HPF
SEG NEUTROPHILS: 81 % (ref 36–66)
SEGMENTED NEUTROPHILS ABSOLUTE COUNT: 4.7 K/UL (ref 1.3–9.1)
SODIUM BLD-SCNC: 137 MMOL/L (ref 135–144)
SPECIFIC GRAVITY UA: 1.01 (ref 1–1.03)
TRICHOMONAS: ABNORMAL
TURBIDITY: CLEAR
URINE HGB: NEGATIVE
UROBILINOGEN, URINE: NORMAL
WBC # BLD: 5.8 K/UL (ref 3.5–11)
WBC # BLD: ABNORMAL 10*3/UL
WBC UA: ABNORMAL /HPF
YEAST: ABNORMAL

## 2019-04-05 PROCEDURE — 6370000000 HC RX 637 (ALT 250 FOR IP): Performed by: STUDENT IN AN ORGANIZED HEALTH CARE EDUCATION/TRAINING PROGRAM

## 2019-04-05 PROCEDURE — 6370000000 HC RX 637 (ALT 250 FOR IP): Performed by: INTERNAL MEDICINE

## 2019-04-05 PROCEDURE — 94660 CPAP INITIATION&MGMT: CPT

## 2019-04-05 PROCEDURE — 85025 COMPLETE CBC W/AUTO DIFF WBC: CPT

## 2019-04-05 PROCEDURE — 87186 SC STD MICRODIL/AGAR DIL: CPT

## 2019-04-05 PROCEDURE — 99223 1ST HOSP IP/OBS HIGH 75: CPT | Performed by: FAMILY MEDICINE

## 2019-04-05 PROCEDURE — 6360000002 HC RX W HCPCS: Performed by: INTERNAL MEDICINE

## 2019-04-05 PROCEDURE — 2580000003 HC RX 258: Performed by: STUDENT IN AN ORGANIZED HEALTH CARE EDUCATION/TRAINING PROGRAM

## 2019-04-05 PROCEDURE — 87086 URINE CULTURE/COLONY COUNT: CPT

## 2019-04-05 PROCEDURE — 2000000000 HC ICU R&B

## 2019-04-05 PROCEDURE — 80048 BASIC METABOLIC PNL TOTAL CA: CPT

## 2019-04-05 PROCEDURE — 87077 CULTURE AEROBIC IDENTIFY: CPT

## 2019-04-05 PROCEDURE — 6370000000 HC RX 637 (ALT 250 FOR IP): Performed by: FAMILY MEDICINE

## 2019-04-05 PROCEDURE — 36415 COLL VENOUS BLD VENIPUNCTURE: CPT

## 2019-04-05 RX ORDER — POTASSIUM CHLORIDE 750 MG/1
20 CAPSULE, EXTENDED RELEASE ORAL 2 TIMES DAILY
Status: DISCONTINUED | OUTPATIENT
Start: 2019-04-05 | End: 2019-04-10 | Stop reason: SDUPTHER

## 2019-04-05 RX ORDER — FUROSEMIDE 10 MG/ML
20 INJECTION INTRAMUSCULAR; INTRAVENOUS 2 TIMES DAILY
Status: DISCONTINUED | OUTPATIENT
Start: 2019-04-05 | End: 2019-04-13

## 2019-04-05 RX ORDER — CIPROFLOXACIN 250 MG/1
250 TABLET, FILM COATED ORAL EVERY 12 HOURS SCHEDULED
Status: DISCONTINUED | OUTPATIENT
Start: 2019-04-05 | End: 2019-04-12

## 2019-04-05 RX ORDER — FUROSEMIDE 10 MG/ML
20 INJECTION INTRAMUSCULAR; INTRAVENOUS ONCE
Status: DISCONTINUED | OUTPATIENT
Start: 2019-04-05 | End: 2019-04-05

## 2019-04-05 RX ORDER — CIPROFLOXACIN 500 MG/1
500 TABLET, FILM COATED ORAL
Status: DISCONTINUED | OUTPATIENT
Start: 2019-04-05 | End: 2019-04-05

## 2019-04-05 RX ADMIN — APIXABAN 2.5 MG: 2.5 TABLET, FILM COATED ORAL at 21:15

## 2019-04-05 RX ADMIN — Medication 1 TABLET: at 08:27

## 2019-04-05 RX ADMIN — CIPROFLOXACIN HYDROCHLORIDE 250 MG: 250 TABLET, FILM COATED ORAL at 21:15

## 2019-04-05 RX ADMIN — POTASSIUM CHLORIDE 20 MEQ: 750 CAPSULE, EXTENDED RELEASE ORAL at 21:15

## 2019-04-05 RX ADMIN — Medication 10 ML: at 08:35

## 2019-04-05 RX ADMIN — FUROSEMIDE 20 MG: 10 INJECTION, SOLUTION INTRAMUSCULAR; INTRAVENOUS at 08:35

## 2019-04-05 RX ADMIN — APIXABAN 2.5 MG: 2.5 TABLET, FILM COATED ORAL at 08:27

## 2019-04-05 RX ADMIN — ATORVASTATIN CALCIUM 80 MG: 80 TABLET, FILM COATED ORAL at 21:15

## 2019-04-05 RX ADMIN — Medication 10 ML: at 21:15

## 2019-04-05 RX ADMIN — POTASSIUM CHLORIDE 20 MEQ: 750 CAPSULE, EXTENDED RELEASE ORAL at 08:26

## 2019-04-05 RX ADMIN — ASPIRIN 81 MG: 81 TABLET, COATED ORAL at 08:27

## 2019-04-05 RX ADMIN — METOPROLOL SUCCINATE 100 MG: 100 TABLET, EXTENDED RELEASE ORAL at 08:27

## 2019-04-05 RX ADMIN — PANTOPRAZOLE SODIUM 40 MG: 40 TABLET, DELAYED RELEASE ORAL at 05:47

## 2019-04-05 RX ADMIN — FUROSEMIDE 20 MG: 10 INJECTION, SOLUTION INTRAMUSCULAR; INTRAVENOUS at 17:46

## 2019-04-05 RX ADMIN — CIPROFLOXACIN HYDROCHLORIDE 250 MG: 250 TABLET, FILM COATED ORAL at 08:26

## 2019-04-05 RX ADMIN — CITALOPRAM HYDROBROMIDE 20 MG: 20 TABLET ORAL at 08:26

## 2019-04-05 ASSESSMENT — PAIN SCALES - GENERAL: PAINLEVEL_OUTOF10: 0

## 2019-04-05 NOTE — H&P
Family Medicine Admit Note    PCP: Kimmie Persaud MD    Date of Admission: 4/4/2019    Date of Service: Pt seen/examined on 4/5/19 and Admitted to Inpatient. Chief Complaint:  SOB      History Of Present Illness: The patient is a 78 y.o. female who presents to [facility]  with respiratory distress and SOB worsening for the past few days. She has chronic CHF and chronic lung disease. She denies any chest pain, N/V, diarrhea, fever, chills, hematuria or melena. Past Medical History:        Diagnosis Date    Acute cystitis without hematuria 12/21/2017    Anxiety     Atrial fibrillation (HCC)     Orantes's esophagus     Centrilobular emphysema (HCC) 7/3/2018    Chronic diastolic CHF (congestive heart failure) ([address] 7/3/2018    Chronic hypoxemic respiratory failure (HCC)     GERD (gastroesophageal reflux disease)     Heart disease     triple bypass    Hypertension     Hypotension, unspecified 1/13/2010    Overview:  Post op required levophed until 1/15. SBP recovered and in 130's. /    Iatrogenic hypotension 3/26/2014    Interstitial lung disease ([address]     Lipids abnormal     Mixed hyperlipidemia 12/20/2017    LIANET on CPAP     Pneumonia 12/20/2017    Pulmonary embolus (HCC) 7/3/2018    Pulmonary fibrosis (HCC)     Pulmonary hypertension (HCC) 7/3/2018    S/P CABG x 4 1/12/2010    Overview:  Pt is on asa/statin/bb/plavix. Postpump echo ok. PMWdc'd. /  Overview:  Overview:  Pt is on asa/statin/bb/plavix. Postpump echo ok. PMWdc'd. /    UIP (usual interstitial pneumonitis) ([address]        Past Surgical History:        Procedure Laterality Date    BREAST BIOPSY  1987    left benign    BYPASS GRAFT N/A     3 cardiac bypasses. Most recent 2010. [address]; 01\2010    triple bypass (2)    CARDIOVERSION      10/25/16    CHOLECYSTECTOMY  1998       Medications Prior to Admission:    Prior to Admission medications    Medication Sig Start Date End Date Taking?  Authorizing Provider   LORazepam (ATIVAN) 0.5 MG tablet Take 0.5 mg by mouth every 8 hours as needed for Anxiety. Yes Historical Provider, MD   furosemide (LASIX) 40 MG tablet Take 40 mg by mouth 2 times daily   Yes Historical Provider, MD   apixaban (ELIQUIS) 5 MG TABS tablet Take 2.5 mg by mouth 2 times daily   Yes Historical Provider, MD   nitroGLYCERIN (NITROSTAT) 0.4 MG SL tablet Place 1 tablet under the tongue as needed for Chest pain 3/6/19  Yes Scout Tavares MD   aspirin 81 MG tablet Take 81 mg by mouth daily   Yes Historical Provider, MD   Calcium Carb-Cholecalciferol 600-400 MG-UNIT CAPS Take 600 mg by mouth daily   Yes Historical Provider, MD   Multiple Vitamins-Minerals (MULTIVITAMIN WITH MINERALS) tablet Take 1 tablet by mouth daily   Yes Historical Provider, MD   metoprolol succinate (TOPROL XL) 100 MG extended release tablet Take 100 mg by mouth daily 11/6/18  Yes Historical Provider, MD   omeprazole (PRILOSEC) 40 MG delayed release capsule TAKE 1 CAPSULE EVERY DAY 12/18/18  Yes Scout Tavares MD   citalopram (CELEXA) 20 MG tablet TAKE 1 TABLET EVERY DAY 11/12/18  Yes Scout Tavares MD   potassium chloride (MICRO-K) 10 MEQ extended release capsule Take 10 mEq by mouth 2 times daily    Yes Historical Provider, MD   atorvastatin (LIPITOR) 80 MG tablet Take 80 mg by mouth daily    Yes Historical Provider, MD   Misc. Devices MISC 1 each by Does not apply route continuous The patient needs extensions for the tubing also. She wears O2 as she ambulates around the house. 10 of these please. 7/24/18   GHULAM Husain Che - CNP   Misc. Devices (ROLLATOR) MISC 1 each by Does not apply route as needed (use for ambulation) 6/5/18   Scout Tavares MD   OXYGEN Inhale 5 L into the lungs continuous     Historical Provider, MD       Allergies:  Penicillins    Social History:  The patient currently lives at home    TOBACCO:   reports that she quit smoking about 39 years ago. Her smoking use included cigarettes.  She started smoking about 61 years ago. She has a 5.50 pack-year smoking history. She has never used smokeless tobacco.  ETOH:   reports that she drinks alcohol. Review of Systems - General ROS: positive for  - fatigue  Psychological ROS: positive for - anxiety  Ophthalmic ROS: positive for - uses glasses  ENT ROS: negative  Endocrine ROS: negative  Respiratory ROS: positive for - shortness of breath  Cardiovascular ROS: positive for - edema and irregular heartbeat  Gastrointestinal ROS: positive for - heartburn  Genito-Urinary ROS: positive for - dysuria  Musculoskeletal ROS: positive for - joint stiffness      Family History:          Problem Relation Age of Onset    Heart Disease Father         congestive heart failure    Liver Disease Mother         liver failure    Heart Disease Brother         MI all 4 brothers    Cancer Sister         colon, lung    Other Sister         lupus, kidney failure    High Blood Pressure Sister     High Cholesterol Sister     Stroke Sister     Heart Disease Sister         MI for both sisters    Cancer Sister         lung cancer both sisters    Stroke Maternal Grandmother        PHYSICAL EXAM:    /61   Pulse 65   Temp 98.2 °F (36.8 °C) (Axillary)   Resp 20   Ht 5' 6\" (1.676 m)   Wt 228 lb 9.9 oz (103.7 kg)   LMP 11/01/1985   SpO2 92%   BMI 36.90 kg/m²     General appearance: No apparent distress appears stated age and cooperative. HEENT Normal cephalic, atraumatic without obvious deformity. Pupils equal, round, and reactive to light. Extra ocular muscles intact. Conjunctivae/corneas clear. Neck: Supple, No jugular venous distention/bruits. Trachea midline without thyromegaly or adenopathy with full range of motion. Lungs: Clear to auscultation, bilaterally without Rales/Wheezes/Rhonchi with decreased breath sounds posteriorly.   Heart: Regular rate and irregular rhythm with Normal S1/S2 without murmurs, rubs or gallops, point of maximum impulse diastolic CHF (congestive heart failure) ([facility] [I50.33] 04/04/2019    Moderate malnutrition ([facility] [E44.0] 01/24/2019    Centrilobular emphysema ([facility] [J43.2] 07/03/2018    Pulmonary hypertension ([facility] [I27.20] 07/03/2018    Interstitial lung disease ([facility] [J84.9] 07/03/2018    Acute cystitis without hematuria [N30.00] 12/21/2017    Mixed hyperlipidemia [E78.2] 12/20/2017    Pulmonary fibrosis (HCC) [J84.10] 08/07/2017    Atrial fibrillation (HCC) [I48.91]     Anxiety [F41.9]     GERD (gastroesophageal reflux disease) [K21.9]     Hypertension [I10]     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 Co-morbidities as above.     Orders Placed This Encounter   Procedures    Urine culture clean catch    XR CHEST PORTABLE    Basic Metabolic Panel    Brain Natriuretic Peptide    Urinalysis    Troponin    CBC Auto Differential    Microscopic Urinalysis    CBC with DIFF    Basic Metabolic Prof    DIET CARDIAC;    Vital signs per unit routine    Notify physician    Notify physician    Up with assistance    Place intermittent pneumatic compression device    Telemetry Monitoring    Full Code    Inpatient consult to Primary Care Provider    Inpatient consult to Pulmonology    Inpatient consult to Cardiology    Respiratory care evaluation only    BIPAP    EKG 12 Lead    Insert peripheral IV    PATIENT STATUS (DIRECT) Inpatient    PATIENT STATUS (FROM ED OR OR/PROCEDURAL) Inpatient         DVT Prophylaxis:   Diet: DIET CARDIAC;  Code Status: Full Code      Dispo - admitted       @[facility]@

## 2019-04-05 NOTE — CONSULTS
Consult note dictated:    Acute on chronic respiratory failure with hypoxia/home O2 at 5 L  Hypervolemia/acute on chronic diastolic/systolic CHF  Severe pulmonary hypertension probably secondary / group 2 and 3  Pulmonary fibrosis/diagnosed by chest CT according to patient  LIANET/home CPAP  History of tobacco abuse 1 pack per day for 15 years quit over 30 years ago, been told vaguely to have COPD but she does not take any bronchodilators at home  Hypertension, CAD/CABG x 4, paroxysmal A. fib, diastolic/systolic CHF  Hx of PE ??   July 2018, patient did not acknowledge

## 2019-04-05 NOTE — PROGRESS NOTES
Dr. Adali hC talked with patient regarding code status, Patient states she wants everything done except intubation. Writer called and spoke with patient son/POA Adanjerry Macias to update on patient wishes, and he agreed that is what they had talked about. New orders received.

## 2019-04-05 NOTE — CARE COORDINATION
CASE MANAGEMENT NOTE:    Admission Date:  4/4/2019 Osvaldo Wallace is a 78 y.o.  female    Admitted for : CHF, left ventricular ([facility] [I50.9]  Acute on chronic diastolic CHF (congestive heart failure) ([facility] [I50.33]    Met with:  Patient    PCP:  Christiano Short                                Insurance:  Medicare    Current Residence/ Living Arrangements:  independently at home             Current Services PTA: yes    Is patient agreeable to VNS: yes    Freedom of choice provided: Yes    List of [address] provided: Yes    VNS chosen: [address]    DME: walker and wc    Home Oxygen: yes    Nebulizer: yes    CPAP/BIPAP: yes    Supplier: N/A    Potential Assistance Needed: No    SNF needed: No    [facility]       Is the Patient an Cycell with Readmission Risk Score greater than 14%? yes  If yes, pt needs a follow up appointment made within 7 days. Does Patient want to use MEDS to BEDS? No    Family Members/Caregivers that pt would like involved in their care:    Yes    If yes, list name here:  Maryjo Alvarez    Transportation Provider:  Family             Is patient in Isolation/One on One/Altered Mental Status? No  If yes, skip next question. If no, would they like an I-Pad to  use? No  If yes, call [phone]. Discharge Plan:  4/5/19 medicare Pt is from home alone in a one story home she has a walker wheelchair home ox and a cpap pt is current with [address] Pt would like info on palliative micha will see today will follow for needs . //tv     Electronically signed by:  Diane Borges RN on 4/5/2019 at 11:00 AM

## 2019-04-05 NOTE — FLOWSHEET NOTE
04/05/19 1400   Encounter Summary   Services provided to: Patient   Referral/Consult From: Todd   Continue Visiting   (4/5/19V)   Volunteer Visit Yes   Complexity of Encounter Low   Length of Encounter 15 minutes   Routine   Type Follow up   Spiritual/Sikh   Type Spiritual support   Intervention [address] Patient received communion

## 2019-04-05 NOTE — CONSULTS
[address]                 [address] [facility], [address]                                  CONSULTATION    PATIENT NAME: Denisse Wolf                      :        1940  MED REC NO:   [id number]                              ROOM:       2013  ACCOUNT NO:   [de-identified]                           ADMIT DATE: 2019  PROVIDER:     Makeda Morris    PULMONARY CRITICAL CARE CONSULTATION    CONSULT DATE:  2019    REFERRING PROVIDER:  Dr. Denisse Mackay. REASON FOR CONSULTATION:  Respiratory failure. HISTORY OF PRESENT ILLNESS:  This is an 42-year-old female with past  medical history significant for severe pulmonary hypertension, pulmonary  fibrosis on chronic home O2 at 5 liters. The patient got sick 2-3 days  ago with increased short of breath, worse with activity, better with  rest.  Did have orthopnea but no paroxysmal nocturnal dyspnea. REVIEW OF SYSTEMS:  GENERAL:  She denies any fever or chills. NEUROLOGIC:  Had some headaches and weakness. No loss of consciousness. EYES:  No redness or watery eyes. ENT:  No nasal congestion or drip. CARDIAC WISE:  No chest pain or palpitation. RESPIRATORY:  Noted increased short of breath. Noted some intermittent  wheezing and some cough with clear sputum. No hemoptysis. GI:  Had no abdominal pain, nausea, vomiting or dysphagia. No change in  bowel movements or GI bleed. GENITOURINARY:  No dysuria or hematuria. MUSCULOSKELETAL:  No neck pain, back pain or joint pain. SKIN:  Noted increased leg swelling. No new rash or ulceration. PSYCHIATRIC:  Denies any anxiety or depressed mood. PAST MEDICAL HISTORY:  Denies any prior history of DVT or PE. Even  though noted in the record that she has history of PE in 2018. Been  told to have COPD but never had been on bronchodilator at home. She  does not have any hay fever or asthma.   Had LIANET before, pulmonary  fibrosis diagnosed with chest CT in the

## 2019-04-05 NOTE — CONSULTS
[facility] CARDIOLOGY CONSULT NOTE      Date of Admission:  4/4/2019    Date of Consultation:  4/5/2019    PCP:  Nela Rasmussen MD      REASON FOR CONSULT: Heart failure. HISTORY OF PRESENT ILLNESS:  Yael Lester is a 78 y.o. female       Patient was just seen by Dr. Layne Gee in our office on February 15, 2019 with his impression and plan as documented below:    \"1. Acute on chronic systolic heart failure edema both legs very limited in therapeutic options with the patient's significant hypotension. 2.  Severe tricuspid regurgitation with severe pulmonary hypertension. 3.  Atrial fibrillation. Periods of junctional rhythm while she was at [facility] On anticoagulation  4. Severe coronary artery disease status post coronary artery bypass done twice. 5.  Idiopathic lung fibrosis followed by Dr. Nickolas Harkins  6. Severe hypotension probably multifactorial related to low cardiac output, severe pulmonary hypertension, medications  Recommendations  Very limited therapeutic options  Advised to reduce the Toprol to 50 is the blood pressure remains below 100 and to stop Toprol completely if the blood pressure below 90. To stop the losartan because of severe hypotension    Ppatient came with her 2 sons Christiane Mondragon and mass you all the above was discussed with them. Very poor prognosis\". Patient was admitted through the emergency room with difficulty breathing which started this Monday or Tuesday, leg swelling. Denied any anginal chest pain or palpitation or lightheadedness or dizziness or syncope. Since admission following diuresis, feeling better.       PMH:   has a past medical history of Acute cystitis without hematuria, Anxiety, Atrial fibrillation ([address] Orantes's esophagus, Centrilobular emphysema ([address] Chronic diastolic CHF (congestive heart failure) ([address] Chronic hypoxemic respiratory failure ([address] GERD (gastroesophageal reflux disease), Heart disease, Hypertension, Hypotension, unspecified, Iatrogenic hypotension, Interstitial lung disease ([address] Lipids abnormal, Mixed hyperlipidemia, LIANET on CPAP, Pneumonia, Pulmonary embolus ([address] Pulmonary fibrosis ([address] Pulmonary hypertension ([address] S/P CABG x 4, and UIP (usual interstitial pneumonitis) ([address] PSH:   has a past surgical history that includes Breast biopsy (1987); Cardiac surgery (1992; 01\2010); Cholecystectomy (1998); Cardioversion; and Bypass Graft (N/A). Allergies: Allergies   Allergen Reactions    Penicillins Rash        Home Meds:    Prior to Admission medications    Medication Sig Start Date End Date Taking? Authorizing Provider   LORazepam (ATIVAN) 0.5 MG tablet Take 0.5 mg by mouth every 8 hours as needed for Anxiety.    Yes Historical Provider, MD   furosemide (LASIX) 40 MG tablet Take 40 mg by mouth 2 times daily   Yes Historical Provider, MD   apixaban (ELIQUIS) 5 MG TABS tablet Take 2.5 mg by mouth 2 times daily   Yes Historical Provider, MD   nitroGLYCERIN (NITROSTAT) 0.4 MG SL tablet Place 1 tablet under the tongue as needed for Chest pain 3/6/19  Yes Cierra Burns MD   aspirin 81 MG tablet Take 81 mg by mouth daily   Yes Historical Provider, MD   Calcium Carb-Cholecalciferol 600-400 MG-UNIT CAPS Take 600 mg by mouth daily   Yes Historical Provider, MD   Multiple Vitamins-Minerals (MULTIVITAMIN WITH MINERALS) tablet Take 1 tablet by mouth daily   Yes Historical Provider, MD   metoprolol succinate (TOPROL XL) 100 MG extended release tablet Take 100 mg by mouth daily 11/6/18  Yes Historical Provider, MD   omeprazole (PRILOSEC) 40 MG delayed release capsule TAKE 1 CAPSULE EVERY DAY 12/18/18  Yes Cierra Burns MD   citalopram (CELEXA) 20 MG tablet TAKE 1 TABLET EVERY DAY 11/12/18  Yes Cierra Burns MD   potassium chloride (MICRO-K) 10 MEQ extended release capsule Take 10 mEq by mouth 2 times daily    Yes Historical Provider, MD   atorvastatin (LIPITOR) 80 MG tablet Take 80 mg by mouth daily Yes Historical Provider, MD   Misc. Devices MISC 1 each by Does not apply route continuous The patient needs extensions for the tubing also. She wears O2 as she ambulates around the house. 10 of these please. 7/24/18   GHULAM Wolff - CNP   Misc.  Devices (ROLLATOR) MISC 1 each by Does not apply route as needed (use for ambulation) 6/5/18   Sherine Del Toro MD   OXYGEN Inhale 5 L into the lungs continuous     Historical Provider, MD        [facility] Meds:    Current Facility-Administered Medications   Medication Dose Route Frequency Provider Last Rate Last Dose    apixaban (ELIQUIS) tablet 2.5 mg  2.5 mg Oral BID Nash Winters MD   2.5 mg at 04/04/19 2047    aspirin EC tablet 81 mg  81 mg Oral Daily Nash Winters MD   81 mg at 04/04/19 2047    atorvastatin (LIPITOR) tablet 80 mg  80 mg Oral Daily Nash Winters MD   80 mg at 04/04/19 2047    calcium carbonate-vitamin D (CALTRATE) 600-400 MG-UNIT per tab 1 tablet  1 tablet Oral Daily Nash Winters MD   1 tablet at 04/04/19 2227    citalopram (CELEXA) tablet 20 mg  20 mg Oral Daily Nash Winters MD   20 mg at 04/04/19 2047    furosemide (LASIX) tablet 40 mg  40 mg Oral BID Nash Winters MD   40 mg at 04/04/19 2047    LORazepam (ATIVAN) tablet 0.5 mg  0.5 mg Oral Q8H PRN Nash Winters MD        metoprolol succinate (TOPROL XL) extended release tablet 100 mg  100 mg Oral Daily Nash Winters MD        pantoprazole (PROTONIX) tablet 40 mg  40 mg Oral QAM AC Nash Winters MD   40 mg at 04/05/19 0547    potassium chloride (MICRO-K) extended release capsule 10 mEq  10 mEq Oral BID Nash Winters MD   10 mEq at 04/04/19 2047    sodium chloride flush 0.9 % injection 10 mL  10 mL Intravenous 2 times per day Nash Winters MD        sodium chloride flush 0.9 % injection 10 mL  10 mL Intravenous PRN Nash Winters MD   10 mL at 04/04/19 2048    acetaminophen (TYLENOL) tablet 650 mg  650 mg Oral Q4H PRN Nash Winters MD       [facility] morphine (PF) injection 2 mg  2 mg Intravenous Q2H PRN Rosa Pete MD        Or    morphine sulfate (PF) injection 4 mg  4 mg Intravenous Q2H PRN Rosa Pete MD        ondansetron [facility]) injection 4 mg  4 mg Intravenous Q8H PRN Rosa Pete MD           Social History:    Social History     Socioeconomic History    Marital status:       Spouse name: Not on file    Number of children: Not on file    Years of education: Not on file    Highest education level: Not on file   Occupational History    Not on file   Social Needs    Financial resource strain: Not on file    Food insecurity:     Worry: Not on file     Inability: Not on file    Transportation needs:     Medical: Not on file     Non-medical: Not on file   Tobacco Use    Smoking status: Former Smoker     Packs/day: 0.25     Years: 22.00     Pack years: 5.50     Types: Cigarettes     Start date: 1958     Last attempt to quit: 1980     Years since quittin.2    Smokeless tobacco: Never Used   Substance and Sexual Activity    Alcohol use: Yes     Comment: occ    Drug use: No    Sexual activity: Never   Lifestyle    Physical activity:     Days per week: Not on file     Minutes per session: Not on file    Stress: Not on file   Relationships    Social connections:     Talks on phone: Not on file     Gets together: Not on file     Attends Worship service: Not on file     Active member of club or organization: Not on file     Attends meetings of clubs or organizations: Not on file     Relationship status: Not on file    Intimate partner violence:     Fear of current or ex partner: Not on file     Emotionally abused: Not on file     Physically abused: Not on file     Forced sexual activity: Not on file   Other Topics Concern    Not on file   Social History Narrative    Not on file       Family History:    Family History   Problem Relation Age of Onset    Heart Disease Father         congestive heart failure    Liver Disease Mother         liver failure    Heart Disease Brother         MI all 4 brothers   Lewis Cancer Sister         colon, lung    Other Sister         lupus, kidney failure    High Blood Pressure Sister     High Cholesterol Sister     Stroke Sister     Heart Disease Sister         MI for both sisters    Cancer Sister         lung cancer both sisters    Stroke Maternal Grandmother        Review of Systems:      · Constitutional: no weight loss or gain, no fever or chills. Chronic weakness. · Eyes: No new visual changes. · ENT: No new hearing loss. · Cardiovascular: see HPI. · Respiratory: No cough. No hemoptysis. · Gastrointestinal: No abdominal pain, no blood in stools. · Genitourinary: No hematuria or increased frequency. · Musculoskeletal:  No new gait disturbance. · Integumentary: No rash or pruritis. · Neurological: No headache. No seizures or recent CVA/TIA. · Psychiatric: No anxiety or depression. · Hematologic/Lymphatic: No abnormal bruising or bleeding. · Allergic/Immunologic: No new allergies. Physical Exam   Vital Signs: /61   Pulse 65   Temp 98.2 °F (36.8 °C) (Axillary)   Resp 20   Ht 5' 6\" (1.676 m)   Wt 228 lb 9.9 oz (103.7 kg)   LMP 11/01/1985   SpO2 92%   BMI 36.90 kg/m²  O2 Flow Rate (L/min): 4 L/min     Admission Weight: 210 lb (95.3 kg)     General appearance:  Thin, elderly, chronically ill-appearing female with the BiPAP in place. Awake, Alert, pleasant. Cooperative. Head: Normocephalic, atraumatic. Eyes: EOM intact. Neck: + JVD, no carotid bruits, no thyromegaly. Chest:   Diminished air entry bilaterally. No chest wall tenderness. Cardiac: Irregular rate and rhythm, + S3 gallop, + systolicmurmur or rubs or clicks. Abdomen: Soft, non-tender. Extremities: + cyanosis of fingers and clubbing of the fingers, +  Bilateral lower leg edema the EPC cuffs in place. No calf tenderness.     Pulses:  Bilaterally symmetrical and problems as charted. Recommendations:    As ordered. Will give IV Lasix for now. Potassium supplementation and maintain level greater than 4.0. Continue other medications as charted. Recently after evaluation by Dr. Jane Wallace on February 15, 2019, \"patient was advised to reduce Toprol to 50 is the blood pressure remains below 100 and to stop Toprol completely if the blood pressure below 90. And to stop the losartan because of severe hypotension. Very poor prognosis. Very limited therapeutic options. \"    Suggest discussing code status with the patient and family and will leave it up to PCP. No family members available at bedside to discuss. Will follow. Thank you. Electronically signed by Chantal Streeter MD, [facility]      PLEASE NOTE:  This progress note was completed using a voice transcription system. Every effort was made to ensure accuracy. However, inadvertent computerized transcription errors may be present.

## 2019-04-05 NOTE — PLAN OF CARE
Problem: Falls - Risk of:  Goal: Will remain free from falls  Description  Will remain free from falls  4/5/2019 1649 by Keith Armas RN  Outcome: Ongoing  Note:   Pt oriented to own abilities, free from falls this shift     Problem: Falls - Risk of:  Goal: Absence of physical injury  Description  Absence of physical injury  Outcome: Ongoing  Note:   Pt free from injury this shift     Problem: Risk for Impaired Skin Integrity  Goal: Tissue integrity - skin and mucous membranes  Description  Structural intactness and normal physiological function of skin and  mucous membranes. 4/5/2019 1649 by Keith Armas RN  Outcome: Ongoing  Note:   Pt able to turn self, free from skin breakdown this shift     Problem: Breathing Pattern - Ineffective:  Goal: Ability to achieve and maintain a regular respiratory rate will improve  Description  Ability to achieve and maintain a regular respiratory rate will improve  Outcome: Ongoing  Note:   Pt taken off bipap to eat, O2 sats drop down around 80, patient back on BiPAP after finished eating.

## 2019-04-05 NOTE — FLOWSHEET NOTE
04/05/19 1009   Encounter Summary   Services provided to: Patient   Referral/Consult From: Rounding   Continue Visiting   (4/5/19)   Complexity of Encounter Low   Length of Encounter 15 minutes   Spiritual/Orthodox   Type Ritual   Intervention Anointing   Sacraments   Sacrament of Sick-Anointing Anointed  (Fr Donald 4/5/19)

## 2019-04-05 NOTE — CARE COORDINATION
[address] Transitions Interview     2019    Patient: Dino Potter Patient : 1940   MRN: [id number]  Reason for Admission: CHF  RARS: Readmission Risk Score: 13         Spoke with: Dalton Cruz met with patient, explained role of CTC, provided contact information, current with [facility] ([facility]-RH) [facility], will continue to follow//JU      Readmission Risk  Patient Active Problem List   Diagnosis    GERD (gastroesophageal reflux disease)    Anxiety    Lipids abnormal    Hypertension    Heart disease    Atrial fibrillation ([address]    Pulmonary fibrosis ([address]    Pneumonia    Mixed hyperlipidemia    Acute cystitis without hematuria    Interstitial lung disease ([address]    Centrilobular emphysema ([address]    Pulmonary hypertension ([address]    Chronic diastolic CHF (congestive heart failure) ([address]    Pulmonary embolus ([address]    CAD (coronary artery disease), native coronary artery    Hypertensive heart disease without heart failure    Hypotension    Hypotension, unspecified    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    Iatrogenic hypotension    Lung nodule, solitary    S/P CABG x 4    Acute on chronic respiratory failure with hypoxemia (HCC)    Hypoxia    Moderate malnutrition (HCC)    CHF, left ventricular (HCC)    Acute on chronic diastolic CHF (congestive heart failure) [facility])       Inpatient Assessment  Care Transitions Summary    Care Transitions Inpatient Review  Medication Review  Do you have all of your prescriptions and are they filled?:  Yes (Comment: son has to  her script for lasix from [facility] but has some lasix at home already)   Barriers to Medication Adherence:  None  Are you able to afford your medications?:  Yes  How often do you have difficulty taking your medications?:  I always take them as prescribed.   Housing Review  Who do you live with?:  Alone  Are you an active caregiver in your home?:  No  Social Support  Durable Medical Equipment  [place]  Patient Home Equipment:  Oxygen  Functional Review  Ability to seek help/take action for Emergent/Urgent situations i.e. fire, crime, inclement weather or health crisis. :  Independent  Ability handle personal hygiene needs (bathing/dressing/grooming): Independent  Ability to manage medications: Independent  Ability to prepare food:  Independent  Ability to maintain home (clean home, laundry):  Needs Assistance  Ability to drive and/or has transportation:  Independent  Ability to do shopping:  Needs Assistance  Ability to manage finances: Independent  Is patient able to live independently?:  Yes  Hearing and Vision  Care Transitions Interventions     Other Services:   (Comment: [facility])          Follow Up  Future Appointments   Date Time Provider Carolann Norris   4/19/2019  1:15 PM [facility] CT RM 1 STCZ CT SCAN [facility] Radiolog   4/29/2019  3:40 PM [facility]ULAM Foster [facility]   6/10/2019  2:00 PM [facility]   6/24/2019  4:00 PM Azalia Peterson DO Resp Spec [address] Maintenance  There are no preventive care reminders to display for this patient.     Sharron Deng RN

## 2019-04-05 NOTE — PROGRESS NOTES
I was asked to see the patient per the patient's request. I stopped twice, and she was sleeping and on the bipap. I asked her if she would like to talk and she stated\" no not now. \" I did introduce what my palliative care role was, and I told her that we would check again on Monday to see if we could try again, and she was agreeable. Will follow for goals of care and support. Fam Vargas .7 [facility] ARA [place]as  [facility]: [phone]  [address]: [phone]  Work Cell: [phone]

## 2019-04-06 LAB
ABSOLUTE EOS #: 0.1 K/UL (ref 0–0.4)
ABSOLUTE IMMATURE GRANULOCYTE: ABNORMAL K/UL (ref 0–0.3)
ABSOLUTE LYMPH #: 0.5 K/UL (ref 1–4.8)
ABSOLUTE MONO #: 0.6 K/UL (ref 0.1–1.3)
ANION GAP SERPL CALCULATED.3IONS-SCNC: 12 MMOL/L (ref 9–17)
BASOPHILS # BLD: 0 % (ref 0–2)
BASOPHILS ABSOLUTE: 0 K/UL (ref 0–0.2)
BUN BLDV-MCNC: 25 MG/DL (ref 8–23)
BUN/CREAT BLD: ABNORMAL (ref 9–20)
CALCIUM SERPL-MCNC: 9.3 MG/DL (ref 8.6–10.4)
CHLORIDE BLD-SCNC: 96 MMOL/L (ref 98–107)
CO2: 27 MMOL/L (ref 20–31)
CREAT SERPL-MCNC: 1.11 MG/DL (ref 0.5–0.9)
CULTURE: ABNORMAL
CULTURE: ABNORMAL
DIFFERENTIAL TYPE: ABNORMAL
EOSINOPHILS RELATIVE PERCENT: 1 % (ref 0–4)
GFR AFRICAN AMERICAN: 57 ML/MIN
GFR NON-AFRICAN AMERICAN: 47 ML/MIN
GFR SERPL CREATININE-BSD FRML MDRD: ABNORMAL ML/MIN/{1.73_M2}
GFR SERPL CREATININE-BSD FRML MDRD: ABNORMAL ML/MIN/{1.73_M2}
GLUCOSE BLD-MCNC: 107 MG/DL (ref 70–99)
HCT VFR BLD CALC: 41.3 % (ref 36–46)
HEMOGLOBIN: 13.2 G/DL (ref 12–16)
IMMATURE GRANULOCYTES: ABNORMAL %
LYMPHOCYTES # BLD: 9 % (ref 24–44)
Lab: ABNORMAL
MCH RBC QN AUTO: 29.5 PG (ref 26–34)
MCHC RBC AUTO-ENTMCNC: 32 G/DL (ref 31–37)
MCV RBC AUTO: 92.1 FL (ref 80–100)
MONOCYTES # BLD: 11 % (ref 1–7)
NRBC AUTOMATED: ABNORMAL PER 100 WBC
PDW BLD-RTO: 20.5 % (ref 11.5–14.9)
PLATELET # BLD: 162 K/UL (ref 150–450)
PLATELET ESTIMATE: ABNORMAL
PMV BLD AUTO: 9.4 FL (ref 6–12)
POTASSIUM SERPL-SCNC: 4.2 MMOL/L (ref 3.7–5.3)
RBC # BLD: 4.49 M/UL (ref 4–5.2)
RBC # BLD: ABNORMAL 10*6/UL
SEG NEUTROPHILS: 79 % (ref 36–66)
SEGMENTED NEUTROPHILS ABSOLUTE COUNT: 4.7 K/UL (ref 1.3–9.1)
SODIUM BLD-SCNC: 135 MMOL/L (ref 135–144)
SPECIMEN DESCRIPTION: ABNORMAL
WBC # BLD: 5.9 K/UL (ref 3.5–11)
WBC # BLD: ABNORMAL 10*3/UL

## 2019-04-06 PROCEDURE — 2700000000 HC OXYGEN THERAPY PER DAY

## 2019-04-06 PROCEDURE — 6360000002 HC RX W HCPCS: Performed by: INTERNAL MEDICINE

## 2019-04-06 PROCEDURE — 6370000000 HC RX 637 (ALT 250 FOR IP): Performed by: FAMILY MEDICINE

## 2019-04-06 PROCEDURE — 36415 COLL VENOUS BLD VENIPUNCTURE: CPT

## 2019-04-06 PROCEDURE — 6370000000 HC RX 637 (ALT 250 FOR IP): Performed by: STUDENT IN AN ORGANIZED HEALTH CARE EDUCATION/TRAINING PROGRAM

## 2019-04-06 PROCEDURE — 99232 SBSQ HOSP IP/OBS MODERATE 35: CPT | Performed by: FAMILY MEDICINE

## 2019-04-06 PROCEDURE — 94762 N-INVAS EAR/PLS OXIMTRY CONT: CPT

## 2019-04-06 PROCEDURE — 80048 BASIC METABOLIC PNL TOTAL CA: CPT

## 2019-04-06 PROCEDURE — 2580000003 HC RX 258: Performed by: STUDENT IN AN ORGANIZED HEALTH CARE EDUCATION/TRAINING PROGRAM

## 2019-04-06 PROCEDURE — 85025 COMPLETE CBC W/AUTO DIFF WBC: CPT

## 2019-04-06 PROCEDURE — 94660 CPAP INITIATION&MGMT: CPT

## 2019-04-06 PROCEDURE — 6370000000 HC RX 637 (ALT 250 FOR IP): Performed by: INTERNAL MEDICINE

## 2019-04-06 PROCEDURE — 2000000000 HC ICU R&B

## 2019-04-06 RX ADMIN — FUROSEMIDE 20 MG: 10 INJECTION, SOLUTION INTRAMUSCULAR; INTRAVENOUS at 17:07

## 2019-04-06 RX ADMIN — ASPIRIN 81 MG: 81 TABLET, COATED ORAL at 08:07

## 2019-04-06 RX ADMIN — APIXABAN 2.5 MG: 2.5 TABLET, FILM COATED ORAL at 08:08

## 2019-04-06 RX ADMIN — FUROSEMIDE 20 MG: 10 INJECTION, SOLUTION INTRAMUSCULAR; INTRAVENOUS at 08:07

## 2019-04-06 RX ADMIN — Medication 10 ML: at 19:39

## 2019-04-06 RX ADMIN — PANTOPRAZOLE SODIUM 40 MG: 40 TABLET, DELAYED RELEASE ORAL at 08:07

## 2019-04-06 RX ADMIN — POTASSIUM CHLORIDE 20 MEQ: 750 CAPSULE, EXTENDED RELEASE ORAL at 19:38

## 2019-04-06 RX ADMIN — CITALOPRAM HYDROBROMIDE 20 MG: 20 TABLET ORAL at 08:05

## 2019-04-06 RX ADMIN — CIPROFLOXACIN HYDROCHLORIDE 250 MG: 250 TABLET, FILM COATED ORAL at 19:39

## 2019-04-06 RX ADMIN — CIPROFLOXACIN HYDROCHLORIDE 250 MG: 250 TABLET, FILM COATED ORAL at 08:07

## 2019-04-06 RX ADMIN — Medication 10 ML: at 08:45

## 2019-04-06 RX ADMIN — ATORVASTATIN CALCIUM 80 MG: 80 TABLET, FILM COATED ORAL at 19:38

## 2019-04-06 RX ADMIN — Medication 1 TABLET: at 08:10

## 2019-04-06 RX ADMIN — POTASSIUM CHLORIDE 20 MEQ: 750 CAPSULE, EXTENDED RELEASE ORAL at 08:06

## 2019-04-06 RX ADMIN — APIXABAN 2.5 MG: 2.5 TABLET, FILM COATED ORAL at 19:38

## 2019-04-06 RX ADMIN — LORAZEPAM 0.5 MG: 0.5 TABLET ORAL at 22:40

## 2019-04-06 RX ADMIN — LORAZEPAM 0.5 MG: 0.5 TABLET ORAL at 13:46

## 2019-04-06 RX ADMIN — METOPROLOL SUCCINATE 100 MG: 100 TABLET, EXTENDED RELEASE ORAL at 08:06

## 2019-04-06 ASSESSMENT — PAIN SCALES - GENERAL
PAINLEVEL_OUTOF10: 0

## 2019-04-06 NOTE — PROGRESS NOTES
FAMILY MEDICINE  - PROGRESS NOTE    Date:  4/6/2019  Zena Meigs  [id number]      Chief Complaint   Patient presents with    Shortness of Breath         Interval History:  Improved slightly, she is not on BiPAP continuously. She has no new complaints.       Subjective  Respiratory: positive for emphysema and shortness of breath  Cardiovascular: positive for lower extremity edema  Musculoskeletal:positive for arthralgias and myalgias  Behavioral/Psych: positive for obesity:    Objective:    /67   Pulse 64   Temp 97.5 °F (36.4 °C) (Axillary)   Resp 24   Ht 5' 6\" (1.676 m)   Wt 221 lb 12.5 oz (100.6 kg)   LMP 11/01/1985   SpO2 92%   BMI 35.80 kg/m²   General appearance - alert, well appearing, and in no distress and overweight  Mental status - alert, oriented to person, place, and time  Eyes - pupils equal and reactive, extraocular eye movements intact  Ears - hearing grossly normal bilaterally  Nose - normal and patent, no erythema, discharge or polyps  Mouth - mucous membranes moist, pharynx normal without lesions  Neck - supple, no significant adenopathy  Lymphatics - no palpable lymphadenopathy, no hepatosplenomegaly  Chest - wheezing noted posteriorly, decreased air entry noted posteriorly  Heart - irregularly irregular rhythm with rate 64  Abdomen - soft, nontender, nondistended, no masses or organomegaly  Breasts - not examined  Back exam - not examined  Neurological - alert, oriented, normal speech, no focal findings or movement disorder noted  Musculoskeletal - osteoarthritic changes noted in both hands  Extremities - peripheral pulses normal, no pedal edema, no clubbing or cyanosis  Skin - normal coloration and turgor, no rashes, no suspicious skin lesions noted    Data:   Medications:   Current Facility-Administered Medications   Medication Dose Route Frequency Provider Last Rate Last Dose    potassium chloride (MICRO-K) extended release capsule 20 mEq  20 mEq Oral BID Isreal Phipps MD   20 mEq at 04/06/19 0806    ciprofloxacin (CIPRO) tablet 250 mg  250 mg Oral 2 times per day Umesh Shane MD   250 mg at 04/06/19 9096    furosemide (LASIX) injection 20 mg  20 mg Intravenous BID Isreal Phipps MD   20 mg at 04/06/19 2238    apixaban (ELIQUIS) tablet 2.5 mg  2.5 mg Oral BID Aquiles Roland MD   2.5 mg at 04/06/19 5434    aspirin EC tablet 81 mg  81 mg Oral Daily Aquiles Roland MD   81 mg at 04/06/19 3576    atorvastatin (LIPITOR) tablet 80 mg  80 mg Oral Daily Aquiles Roland MD   80 mg at 04/05/19 2115    calcium carbonate-vitamin D (CALTRATE) 600-400 MG-UNIT per tab 1 tablet  1 tablet Oral Daily Aquiles Roland MD   1 tablet at 04/06/19 0810    citalopram (CELEXA) tablet 20 mg  20 mg Oral Daily Aquiles Roland MD   20 mg at 04/06/19 0805    LORazepam (ATIVAN) tablet 0.5 mg  0.5 mg Oral Q8H PRN Aquiles Roland MD        metoprolol succinate (TOPROL XL) extended release tablet 100 mg  100 mg Oral Daily Aquiles Roland MD   100 mg at 04/06/19 0806    pantoprazole (PROTONIX) tablet 40 mg  40 mg Oral QAM AC Aquiles Roland MD   40 mg at 04/06/19 9501    sodium chloride flush 0.9 % injection 10 mL  10 mL Intravenous 2 times per day Aquiles Roland MD   10 mL at 04/05/19 2115    sodium chloride flush 0.9 % injection 10 mL  10 mL Intravenous PRN Aquiles Roland MD   10 mL at 04/04/19 2048    acetaminophen (TYLENOL) tablet 650 mg  650 mg Oral Q4H PRN Aquiles Roland MD        morphine (PF) injection 2 mg  2 mg Intravenous Q2H PRN Aquiles Roland MD        Or    morphine sulfate (PF) injection 4 mg  4 mg Intravenous Q2H PRN Aquiles Roland MD        ondansetron [facility]) injection 4 mg  4 mg Intravenous Q8H PRN Aquiles Roland MD           Intake/Output Summary (Last 24 hours) at 4/6/2019 0915  Last data filed at 4/6/2019 0906  Gross per 24 hour   Intake 1250 ml   Output 800 ml   Net 450 ml     Recent Results (from the past 24 hour(s))   Basic Metabolic Prof    Collection Time: 04/05/19 10:29 AM   Result Value Ref Range    Glucose 159 (H) 70 - 99 mg/dL    BUN 24 (H) 8 - 23 mg/dL    CREATININE 0.92 (H) 0.50 - 0.90 mg/dL    Bun/Cre Ratio NOT REPORTED 9 - 20    Calcium 9.1 8.6 - 10.4 mg/dL    Sodium 137 135 - 144 mmol/L    Potassium 3.6 (L) 3.7 - 5.3 mmol/L    Chloride 99 98 - 107 mmol/L    CO2 29 20 - 31 mmol/L    Anion Gap 9 9 - 17 mmol/L    GFR Non-African American 59 (L) >60 mL/min    GFR African American >60 >60 mL/min    GFR Comment          GFR Staging NOT REPORTED    CBC with DIFF    Collection Time: 04/05/19 10:29 AM   Result Value Ref Range    WBC 5.8 3.5 - 11.0 k/uL    RBC 4.52 4.0 - 5.2 m/uL    Hemoglobin 13.1 12.0 - 16.0 g/dL    Hematocrit 41.4 36 - 46 %    MCV 91.8 80 - 100 fL    MCH 29.1 26 - 34 pg    MCHC 31.7 31 - 37 g/dL    RDW 20.6 (H) 11.5 - 14.9 %    Platelets 778 937 - 962 k/uL    MPV 8.3 6.0 - 12.0 fL    NRBC Automated NOT REPORTED per 100 WBC    Differential Type NOT REPORTED     Immature Granulocytes NOT REPORTED 0 %    Absolute Immature Granulocyte NOT REPORTED 0.00 - 0.30 k/uL    WBC Morphology NOT REPORTED     RBC Morphology NOT REPORTED     Platelet Estimate NOT REPORTED     Seg Neutrophils 81 (H) 36 - 66 %    Lymphocytes 9 (L) 24 - 44 %    Monocytes 9 (H) 1 - 7 %    Eosinophils % 1 0 - 4 %    Basophils 0 0 - 2 %    Segs Absolute 4.70 1.3 - 9.1 k/uL    Absolute Lymph # 0.52 (L) 1.0 - 4.8 k/uL    Absolute Mono # 0.52 0.1 - 1.3 k/uL    Absolute Eos # 0.06 0.0 - 0.4 k/uL    Basophils # 0.00 0.0 - 0.2 k/uL    Morphology ANISOCYTOSIS PRESENT     Morphology 1+ ELLIPTOCYTES     Morphology 1+ ECHINOCYTES     Morphology FEW SCHISTOCYTES    Basic Metabolic Prof    Collection Time: 04/06/19  3:58 AM   Result Value Ref Range    Glucose 107 (H) 70 - 99 mg/dL    BUN 25 (H) 8 - 23 mg/dL    CREATININE 1.11 (H) 0.50 - 0.90 mg/dL    Bun/Cre Ratio NOT REPORTED 9 - 20    Calcium 9.3 8.6 - 10.4 mg/dL    Sodium 135 135 - 144 mmol/L    Potassium 4.2 3.7 - 5.3 mmol/L    Chloride 96 (L) 98 - 107 mmol/L    CO2 27 20 - 31 mmol/L    Anion Gap 12 9 - 17 mmol/L    GFR Non-African American 47 (L) >60 mL/min    GFR  57 (L) >60 mL/min    GFR Comment          GFR Staging NOT REPORTED    CBC with DIFF    Collection Time: 04/06/19  3:58 AM   Result Value Ref Range    WBC 5.9 3.5 - 11.0 k/uL    RBC 4.49 4.0 - 5.2 m/uL    Hemoglobin 13.2 12.0 - 16.0 g/dL    Hematocrit 41.3 36 - 46 %    MCV 92.1 80 - 100 fL    MCH 29.5 26 - 34 pg    MCHC 32.0 31 - 37 g/dL    RDW 20.5 (H) 11.5 - 14.9 %    Platelets 959 838 - 219 k/uL    MPV 9.4 6.0 - 12.0 fL    NRBC Automated NOT REPORTED per 100 WBC    Differential Type NOT REPORTED     Seg Neutrophils 79 (H) 36 - 66 %    Lymphocytes 9 (L) 24 - 44 %    Monocytes 11 (H) 1 - 7 %    Eosinophils % 1 0 - 4 %    Basophils 0 0 - 2 %    Immature Granulocytes NOT REPORTED 0 %    Segs Absolute 4.70 1.3 - 9.1 k/uL    Absolute Lymph # 0.50 (L) 1.0 - 4.8 k/uL    Absolute Mono # 0.60 0.1 - 1.3 k/uL    Absolute Eos # 0.10 0.0 - 0.4 k/uL    Basophils # 0.00 0.0 - 0.2 k/uL    Absolute Immature Granulocyte NOT REPORTED 0.00 - 0.30 k/uL    WBC Morphology NOT REPORTED     RBC Morphology NOT REPORTED     Platelet Estimate NOT REPORTED      -----------------------------------------------------------------  RAD:  EKG:  Micro:     Assessment & Plan:    Patient Active Problem List:     GERD (gastroesophageal reflux disease)     Anxiety     Lipids abnormal     Hypertension     Heart disease     Atrial fibrillation (HCC)     Pulmonary fibrosis (HCC)     Pneumonia     Mixed hyperlipidemia     Acute cystitis without hematuria     Interstitial lung disease (HCC)     Centrilobular emphysema (HCC)     Pulmonary hypertension (HCC)     Chronic diastolic CHF (congestive heart failure) (HCC)     Pulmonary embolus (HCC)     CAD (coronary artery disease), native coronary artery     Hypertensive heart disease without heart failure

## 2019-04-06 NOTE — PROGRESS NOTES
Pulmonary Progress Note  [facility]      Patient - Kristy Miller,  Age - 78 y.o.    - 1940      Room Number -    N -  [id number]   [facility]  Date of Admission -  2019  4:16 PM    Follow-up: Acute respiratory failure    Consulting 838 Loly Sanchez MD  Primary Care Physician - Hansa Ramirez MD     SUBJECTIVE   Feeling better, on BiPAP 12/6 and 50% FiO2  Denies any fever chills, no chest pain  Less short of breath, not much cough with clear sputum, no wheezing  Denies any nausea vomiting or diarrhea    OBJECTIVE   VITALS    height is 5' 6\" (1.676 m) and weight is 221 lb 12.5 oz (100.6 kg). Her axillary temperature is 97.5 °F (36.4 °C). Her blood pressure is 108/64 and her pulse is 72. Her respiration is 23 and oxygen saturation is 88% (abnormal). Body mass index is 35.8 kg/m². Temperature Range: Temp: 97.5 °F (36.4 °C) Temp  Av.6 °F (36.4 °C)  Min: 97.5 °F (36.4 °C)  Max: 97.7 °F (36.5 °C)  BP Range:  Systolic (46UDE), GIN:571 , Min:91 , KNC:057     Diastolic (87FEP), IGV:13, Min:46, Max:76    Pulse Range: Pulse  Av.6  Min: 63  Max: 72  Respiration Range: Resp  Av.8  Min: 15  Max: 25  Current Pulse Ox[de-identified]  SpO2: (!) 88 %  24HR Pulse Ox Range:  SpO2  Av.5 %  Min: 86 %  Max: 92 %  Oxygen Amount and Delivery: O2 Flow Rate (L/min): 5 L/min    Wt Readings from Last 3 Encounters:   19 221 lb 12.5 oz (100.6 kg)   19 194 lb (88 kg)   19 189 lb (85.7 kg)       I/O (24 Hours)    Intake/Output Summary (Last 24 hours) at 2019 1358  Last data filed at 2019 0936  Gross per 24 hour   Intake 1250 ml   Output 925 ml   Net 325 ml       EXAM     General Appearance  Awake, alert,  in no acute distress  HEENT - normocephalic, atraumatic.    Neck - Supple,  trachea midline no JVD  Lungs - coarse, bibasilar crackles, no wheezing or distress  Cardiovascular - Heart sounds are normal.  irregular rate and rhythm systolic murmur  Abdomen - Soft, nontender, nondistended, no guarding  Neurologic - appropriate, following commands  Skin - No bruising or bleeding  Extremities - No clubbing, cyanosis, bilateral edema    MEDS      potassium chloride  20 mEq Oral BID    ciprofloxacin  250 mg Oral 2 times per day    furosemide  20 mg Intravenous BID    apixaban  2.5 mg Oral BID    aspirin  81 mg Oral Daily    atorvastatin  80 mg Oral Daily    calcium carbonate-vitamin D  1 tablet Oral Daily    citalopram  20 mg Oral Daily    metoprolol succinate  100 mg Oral Daily    pantoprazole  40 mg Oral QAM AC    sodium chloride flush  10 mL Intravenous 2 times per day       LORazepam, sodium chloride flush, acetaminophen, morphine **OR** morphine, ondansetron    LABS   CBC   Recent Labs     04/06/19  0358   WBC 5.9   HGB 13.2   HCT 41.3   MCV 92.1        BMP:   Lab Results   Component Value Date     04/06/2019    K 4.2 04/06/2019    CL 96 04/06/2019    CO2 27 04/06/2019    BUN 25 04/06/2019    LABALBU 3.2 01/30/2019    CREATININE 1.11 04/06/2019    CALCIUM 9.3 04/06/2019    GFRAA 57 04/06/2019    LABGLOM 47 04/06/2019     ABGs:  Lab Results   Component Value Date    PHART 7.404 01/24/2019    PO2ART 64.1 01/24/2019    DBJ2YSN 46.1 01/24/2019      Lab Results   Component Value Date    MODE NOT REPORTED 01/24/2019     Ionized Calcium:  No results found for: IONCA  Magnesium:    Lab Results   Component Value Date    MG 1.8 12/27/2017      Phosphorus:  No results found for: PHOS     LIVER PROFILE No results for input(s): AST, ALT, LIPASE, BILIDIR, BILITOT, ALKPHOS in the last 72 hours. Invalid input(s): AMYLASE,  ALB  INR No results for input(s): INR in the last 72 hours. PTT No results for input(s): APTT in the last 72 hours. BNP No results for input(s): BNP in the last 72 hours.     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 Acute on chronic respiratory care with hypoxia, home O2 at 5 liters. 2.  Hypervolemia, acute on chronic diastolic/systolic CHF. 3.  Severe pulmonary hypertension, probably group 2 and 3.  4.  Pulmonary fibrosis diagnosed by chest CT according to patient. 5.  LIANET on home CPAP. 6.  History of tobacco abuse a pack a day for 15 years, quit over 30  years ago, been told to have COPD  but does not take any inhalers at home or bronchodilators. 7.  History of hypertension, coronary disease, CABG x4 and paroxysmal AFib, diastolic/systolic CHF. 9.  History of PE from the record in 07/2018. patient did not  acknowledge.     PLAN OF TREATMENT:    O2 , diuresis. BiPAP as needed,   Eliquis. will need PFTs as an outpatient with her pulmonologist    chest x-ray in a.m.    DNR-CCA, no intubation     Electronically signed by Ashley Fontana MD on 4/6/2019 at 1:58 PM

## 2019-04-06 NOTE — PROGRESS NOTES
[facility] PHYSICIANS CARDIOLOGY Progress Note    2019 10:39 AM      Subjective:  Ms. Combs Neighbor short of breath the but denies any chest pain or palpitations or lightheadedness. States that she is not diuresing that much. LABS:     Recent Results (from the past 24 hour(s))   Basic Metabolic Prof    Collection Time: 19  3:58 AM   Result Value Ref Range    Glucose 107 (H) 70 - 99 mg/dL    BUN 25 (H) 8 - 23 mg/dL    CREATININE 1.11 (H) 0.50 - 0.90 mg/dL    Bun/Cre Ratio NOT REPORTED 9 - 20    Calcium 9.3 8.6 - 10.4 mg/dL    Sodium 135 135 - 144 mmol/L    Potassium 4.2 3.7 - 5.3 mmol/L    Chloride 96 (L) 98 - 107 mmol/L    CO2 27 20 - 31 mmol/L    Anion Gap 12 9 - 17 mmol/L    GFR Non-African American 47 (L) >60 mL/min    GFR  57 (L) >60 mL/min    GFR Comment          GFR Staging NOT REPORTED    CBC with DIFF    Collection Time: 19  3:58 AM   Result Value Ref Range    WBC 5.9 3.5 - 11.0 k/uL    RBC 4.49 4.0 - 5.2 m/uL    Hemoglobin 13.2 12.0 - 16.0 g/dL    Hematocrit 41.3 36 - 46 %    MCV 92.1 80 - 100 fL    MCH 29.5 26 - 34 pg    MCHC 32.0 31 - 37 g/dL    RDW 20.5 (H) 11.5 - 14.9 %    Platelets 571 394 - 416 k/uL    MPV 9.4 6.0 - 12.0 fL    NRBC Automated NOT REPORTED per 100 WBC    Differential Type NOT REPORTED     Seg Neutrophils 79 (H) 36 - 66 %    Lymphocytes 9 (L) 24 - 44 %    Monocytes 11 (H) 1 - 7 %    Eosinophils % 1 0 - 4 %    Basophils 0 0 - 2 %    Immature Granulocytes NOT REPORTED 0 %    Segs Absolute 4.70 1.3 - 9.1 k/uL    Absolute Lymph # 0.50 (L) 1.0 - 4.8 k/uL    Absolute Mono # 0.60 0.1 - 1.3 k/uL    Absolute Eos # 0.10 0.0 - 0.4 k/uL    Basophils # 0.00 0.0 - 0.2 k/uL    Absolute Immature Granulocyte NOT REPORTED 0.00 - 0.30 k/uL    WBC Morphology NOT REPORTED     RBC Morphology NOT REPORTED     Platelet Estimate NOT REPORTED        Pulse Ox:  SpO2  Av.6 %  Min: 86 %  Max: 93 %    Supplemental O2: O2 Flow Rate (L/min): 5 L/min     Current range.  4.  Severe coronary artery disease, status post coronary artery bypass done twice  In 1992 and 2010. 5.  Idiopathic  pulmonary fibrosis (followed by Dr. Emmett Arteaga as outpatient). 6.   Other problems as charted. REC/PLAN:     As ordered. Continue on IV Lasix, aspirin, Eliquis, atorvastatin, Toprol-XL, potassium supplement along with the oxygen. Overall very poor prognosis. I discussed this issue with patient voices understanding. No family members available at bedside to discuss. Will follow. Electronically signed by Radha Cheney MD, [facility]        PLEASE NOTE:  This progress note was completed using a voice transcription system. Every effort was made to ensure accuracy. However, inadvertent computerized transcription errors may be present.

## 2019-04-06 NOTE — PROGRESS NOTES
Patient off bipap at this time, spo2 89% on 5 L n/c, breath sounds diminished, no distress noted at this time.

## 2019-04-06 NOTE — PROGRESS NOTES
Swapped patient circuit back over to un-humidified circuit by patient request, patient complained it was too loud. Patient spo2 90% on 12/6 @ 50%. No distress noted, breath sounds diminished.

## 2019-04-06 NOTE — CARE COORDINATION
ONGOING DISCHARGE PLAN:    Spoke with patient regarding discharge plan and patient confirms that plan is to go home with [facility]    Patient is short of breath and advised that she may have PT/OT work with her to determine what her strengths and weaknesses are and she was agreeable to this. Advised we can talk about discharge plans tomorrow. .  Patient was agreeable. Her plan at this time is to return home with [address]. She is on Oxygen at home 5l/nc    Palliative Care RN to See patient on monday    Remains on IV Lasix BID,     Will continue to follow for additional discharge needs.     Electronically signed by Chelsie Murcia RN on 4/6/2019 at 5:06 PM

## 2019-04-06 NOTE — PROGRESS NOTES
Pt up in chair on 5lpm nc. spo2 90%. bipap on standby at bedside. Pt requesting humidity for bipap. Passive cool humidity added with new circuit. Pt requested that it not be heated as she is afraid she will be too hot.

## 2019-04-07 ENCOUNTER — APPOINTMENT (OUTPATIENT)
Dept: GENERAL RADIOLOGY | Age: 79
DRG: 291 | End: 2019-04-07
Payer: MEDICARE

## 2019-04-07 LAB
ABSOLUTE EOS #: 0.1 K/UL (ref 0–0.4)
ABSOLUTE IMMATURE GRANULOCYTE: ABNORMAL K/UL (ref 0–0.3)
ABSOLUTE LYMPH #: 0.5 K/UL (ref 1–4.8)
ABSOLUTE MONO #: 0.8 K/UL (ref 0.1–1.3)
ANION GAP SERPL CALCULATED.3IONS-SCNC: 10 MMOL/L (ref 9–17)
BASOPHILS # BLD: 1 % (ref 0–2)
BASOPHILS ABSOLUTE: 0.1 K/UL (ref 0–0.2)
BUN BLDV-MCNC: 27 MG/DL (ref 8–23)
BUN/CREAT BLD: ABNORMAL (ref 9–20)
CALCIUM SERPL-MCNC: 9.4 MG/DL (ref 8.6–10.4)
CHLORIDE BLD-SCNC: 96 MMOL/L (ref 98–107)
CO2: 28 MMOL/L (ref 20–31)
CREAT SERPL-MCNC: 0.94 MG/DL (ref 0.5–0.9)
DIFFERENTIAL TYPE: ABNORMAL
EOSINOPHILS RELATIVE PERCENT: 1 % (ref 0–4)
GFR AFRICAN AMERICAN: >60 ML/MIN
GFR NON-AFRICAN AMERICAN: 57 ML/MIN
GFR SERPL CREATININE-BSD FRML MDRD: ABNORMAL ML/MIN/{1.73_M2}
GFR SERPL CREATININE-BSD FRML MDRD: ABNORMAL ML/MIN/{1.73_M2}
GLUCOSE BLD-MCNC: 100 MG/DL (ref 70–99)
HCT VFR BLD CALC: 41.7 % (ref 36–46)
HEMOGLOBIN: 13.4 G/DL (ref 12–16)
IMMATURE GRANULOCYTES: ABNORMAL %
LYMPHOCYTES # BLD: 8 % (ref 24–44)
MCH RBC QN AUTO: 29.8 PG (ref 26–34)
MCHC RBC AUTO-ENTMCNC: 32.2 G/DL (ref 31–37)
MCV RBC AUTO: 92.8 FL (ref 80–100)
MONOCYTES # BLD: 12 % (ref 1–7)
NRBC AUTOMATED: ABNORMAL PER 100 WBC
PDW BLD-RTO: 20.5 % (ref 11.5–14.9)
PLATELET # BLD: 153 K/UL (ref 150–450)
PLATELET ESTIMATE: ABNORMAL
PMV BLD AUTO: 10 FL (ref 6–12)
POTASSIUM SERPL-SCNC: 4.7 MMOL/L (ref 3.7–5.3)
RBC # BLD: 4.49 M/UL (ref 4–5.2)
RBC # BLD: ABNORMAL 10*6/UL
SEG NEUTROPHILS: 78 % (ref 36–66)
SEGMENTED NEUTROPHILS ABSOLUTE COUNT: 5.1 K/UL (ref 1.3–9.1)
SODIUM BLD-SCNC: 134 MMOL/L (ref 135–144)
WBC # BLD: 6.5 K/UL (ref 3.5–11)
WBC # BLD: ABNORMAL 10*3/UL

## 2019-04-07 PROCEDURE — 80048 BASIC METABOLIC PNL TOTAL CA: CPT

## 2019-04-07 PROCEDURE — 36415 COLL VENOUS BLD VENIPUNCTURE: CPT

## 2019-04-07 PROCEDURE — 6360000002 HC RX W HCPCS: Performed by: INTERNAL MEDICINE

## 2019-04-07 PROCEDURE — 6370000000 HC RX 637 (ALT 250 FOR IP): Performed by: STUDENT IN AN ORGANIZED HEALTH CARE EDUCATION/TRAINING PROGRAM

## 2019-04-07 PROCEDURE — 6370000000 HC RX 637 (ALT 250 FOR IP): Performed by: INTERNAL MEDICINE

## 2019-04-07 PROCEDURE — 94640 AIRWAY INHALATION TREATMENT: CPT

## 2019-04-07 PROCEDURE — 94762 N-INVAS EAR/PLS OXIMTRY CONT: CPT

## 2019-04-07 PROCEDURE — 2580000003 HC RX 258: Performed by: STUDENT IN AN ORGANIZED HEALTH CARE EDUCATION/TRAINING PROGRAM

## 2019-04-07 PROCEDURE — 6370000000 HC RX 637 (ALT 250 FOR IP): Performed by: FAMILY MEDICINE

## 2019-04-07 PROCEDURE — 85025 COMPLETE CBC W/AUTO DIFF WBC: CPT

## 2019-04-07 PROCEDURE — 2700000000 HC OXYGEN THERAPY PER DAY

## 2019-04-07 PROCEDURE — 2060000000 HC ICU INTERMEDIATE R&B

## 2019-04-07 PROCEDURE — 94660 CPAP INITIATION&MGMT: CPT

## 2019-04-07 PROCEDURE — 99232 SBSQ HOSP IP/OBS MODERATE 35: CPT | Performed by: FAMILY MEDICINE

## 2019-04-07 PROCEDURE — 71045 X-RAY EXAM CHEST 1 VIEW: CPT

## 2019-04-07 RX ORDER — IPRATROPIUM BROMIDE AND ALBUTEROL SULFATE 2.5; .5 MG/3ML; MG/3ML
1 SOLUTION RESPIRATORY (INHALATION)
Status: DISCONTINUED | OUTPATIENT
Start: 2019-04-07 | End: 2019-04-11

## 2019-04-07 RX ADMIN — IPRATROPIUM BROMIDE AND ALBUTEROL SULFATE 1 AMPULE: .5; 3 SOLUTION RESPIRATORY (INHALATION) at 16:04

## 2019-04-07 RX ADMIN — PANTOPRAZOLE SODIUM 40 MG: 40 TABLET, DELAYED RELEASE ORAL at 06:14

## 2019-04-07 RX ADMIN — CITALOPRAM HYDROBROMIDE 20 MG: 20 TABLET ORAL at 08:36

## 2019-04-07 RX ADMIN — CIPROFLOXACIN HYDROCHLORIDE 250 MG: 250 TABLET, FILM COATED ORAL at 20:46

## 2019-04-07 RX ADMIN — POTASSIUM CHLORIDE 20 MEQ: 750 CAPSULE, EXTENDED RELEASE ORAL at 20:46

## 2019-04-07 RX ADMIN — APIXABAN 2.5 MG: 2.5 TABLET, FILM COATED ORAL at 08:36

## 2019-04-07 RX ADMIN — IPRATROPIUM BROMIDE AND ALBUTEROL SULFATE 1 AMPULE: .5; 3 SOLUTION RESPIRATORY (INHALATION) at 19:38

## 2019-04-07 RX ADMIN — CIPROFLOXACIN HYDROCHLORIDE 250 MG: 250 TABLET, FILM COATED ORAL at 08:36

## 2019-04-07 RX ADMIN — Medication 1 TABLET: at 08:36

## 2019-04-07 RX ADMIN — ASPIRIN 81 MG: 81 TABLET, COATED ORAL at 08:36

## 2019-04-07 RX ADMIN — ATORVASTATIN CALCIUM 80 MG: 80 TABLET, FILM COATED ORAL at 22:00

## 2019-04-07 RX ADMIN — APIXABAN 2.5 MG: 2.5 TABLET, FILM COATED ORAL at 20:46

## 2019-04-07 RX ADMIN — FUROSEMIDE 20 MG: 10 INJECTION, SOLUTION INTRAMUSCULAR; INTRAVENOUS at 08:36

## 2019-04-07 RX ADMIN — Medication 10 ML: at 22:00

## 2019-04-07 RX ADMIN — FUROSEMIDE 20 MG: 10 INJECTION, SOLUTION INTRAMUSCULAR; INTRAVENOUS at 18:07

## 2019-04-07 RX ADMIN — LORAZEPAM 0.5 MG: 0.5 TABLET ORAL at 20:46

## 2019-04-07 RX ADMIN — Medication 10 ML: at 08:37

## 2019-04-07 RX ADMIN — POTASSIUM CHLORIDE 20 MEQ: 750 CAPSULE, EXTENDED RELEASE ORAL at 08:36

## 2019-04-07 RX ADMIN — METOPROLOL SUCCINATE 100 MG: 100 TABLET, EXTENDED RELEASE ORAL at 08:36

## 2019-04-07 ASSESSMENT — PAIN SCALES - GENERAL
PAINLEVEL_OUTOF10: 0
PAINLEVEL_OUTOF10: 0

## 2019-04-07 NOTE — PLAN OF CARE
Problem: Falls - Risk of:  Goal: Will remain free from falls  Description  Will remain free from falls  4/7/2019 0416 by Arina Garrison RN  Outcome: Ongoing  Note:   The patient remained free from falls this shift, call light within reach, bed in locked and lowest position. Side rails up x2. Continue to monitor closely. 4/6/2019 1645 by Skyler Marshall RN  Outcome: Met This Shift     Problem: Risk for Impaired Skin Integrity  Goal: Tissue integrity - skin and mucous membranes  Description  Structural intactness and normal physiological function of skin and  mucous membranes. 4/7/2019 0416 by Arina Garrison RN  Outcome: Ongoing  Note:   Skin assessment as charted. Patient repositions self in bed. 4/6/2019 1645 by Skyler Marshall RN  Outcome: Ongoing     Problem: Breathing Pattern - Ineffective:  Goal: Ability to achieve and maintain a regular respiratory rate will improve  Description  Ability to achieve and maintain a regular respiratory rate will improve  4/7/2019 0416 by Arina Garrison RN  Outcome: Ongoing  Note:   RR as charted.    4/6/2019 1645 by Skyler Marshall RN  Outcome: Ongoing

## 2019-04-07 NOTE — FLOWSHEET NOTE
Patient stated she is discouraged and frustrated; patient comforted by prayer and listening presence;     04/07/19 1100   Encounter Summary   Services provided to: Patient   Referral/Consult From: Palliative Care   Continue Visiting   (4/7/19)   Complexity of Encounter Moderate   Length of Encounter 15 minutes   Spiritual Assessment Completed Yes   Routine   Type Follow up   Spiritual/Sabianism   Type Spiritual support   Assessment Approachable; Anxious; Helplessness; Hopeful   Intervention Active listening;Explored feelings, thoughts, concerns;Prayer;Sustaining presence/ Ministry of presence;Nurtured hope;Discussed illness/injury and it's impact   Outcome Comfort;Expressed gratitude;Engaged in conversation;Expressed feelings/needs/concerns;Coping; Hopeful;Receptive

## 2019-04-07 NOTE — PROGRESS NOTES
Pulmonary Progress Note  Pulmonary and Critical Care Specialists      Patient - Mary Ochoa,  Age - 78 y.o.    - 1940      Room Number -    MRN -  [id number]   [facility]  Date of Admission -  2019  4:16 PM    Follow-up: Acute respiratory failure    Consulting 838 Loly Sanchez MD  Primary Care Physician - Doris Souza MD     SUBJECTIVE   Feeling same, on BiPAP 12/6 and 60% FiO2, desatting off BiPAP on 6 L O2 down to 83%  Denies any fever chills, no chest pain  Baseline short of breath, not much cough with clear sputum, no wheezing  Denies any nausea vomiting     OBJECTIVE   VITALS    height is 5' 6\" (1.676 m) and weight is 221 lb 1.9 oz (100.3 kg). Her oral temperature is 98.5 °F (36.9 °C). Her blood pressure is 110/63 and her pulse is 70. Her respiration is 21 and oxygen saturation is 85% (abnormal). Body mass index is 35.69 kg/m². Temperature Range: Temp: 98.5 °F (36.9 °C) Temp  Av.3 °F (36.8 °C)  Min: 97.6 °F (36.4 °C)  Max: 98.5 °F (36.9 °C)  BP Range:  Systolic (19OWG), YSC:537 , Min:86 , TRN:098     Diastolic (93CFK), LCH:16, Min:44, Max:113    Pulse Range: Pulse  Av.7  Min: 64  Max: 75  Respiration Range: Resp  Av.5  Min: 13  Max: 25  Current Pulse Ox[de-identified]  SpO2: (!) 85 %  24HR Pulse Ox Range:  SpO2  Av %  Min: 85 %  Max: 96 %  Oxygen Amount and Delivery: O2 Flow Rate (L/min): 5 L/min    Wt Readings from Last 3 Encounters:   19 221 lb 1.9 oz (100.3 kg)   19 194 lb (88 kg)   19 189 lb (85.7 kg)       I/O (24 Hours)    Intake/Output Summary (Last 24 hours) at 2019 1441  Last data filed at 2019 1433  Gross per 24 hour   Intake 730 ml   Output 1650 ml   Net -920 ml       EXAM     General Appearance  Awake, alert,  in no acute distress  HEENT - normocephalic, atraumatic.    Neck - Supple,  trachea midline no JVD  Lungs - coarse, bibasilar crackles, no wheezing or distress  Cardiovascular - Heart sounds are normal.  irregular rate and rhythm,  systolic murmur  Abdomen - Soft, nontender, nondistended, no guarding  Neurologic - appropriate, following commands  Skin - No bruising or bleeding  Extremities - No clubbing, cyanosis, bilateral edema    MEDS      potassium chloride  20 mEq Oral BID    ciprofloxacin  250 mg Oral 2 times per day    furosemide  20 mg Intravenous BID    apixaban  2.5 mg Oral BID    aspirin  81 mg Oral Daily    atorvastatin  80 mg Oral Daily    calcium carbonate-vitamin D  1 tablet Oral Daily    citalopram  20 mg Oral Daily    metoprolol succinate  100 mg Oral Daily    pantoprazole  40 mg Oral QAM AC    sodium chloride flush  10 mL Intravenous 2 times per day       LORazepam, sodium chloride flush, acetaminophen, morphine **OR** morphine, ondansetron    LABS   CBC   Recent Labs     04/07/19  0416   WBC 6.5   HGB 13.4   HCT 41.7   MCV 92.8        BMP:   Lab Results   Component Value Date     04/07/2019    K 4.7 04/07/2019    CL 96 04/07/2019    CO2 28 04/07/2019    BUN 27 04/07/2019    LABALBU 3.2 01/30/2019    CREATININE 0.94 04/07/2019    CALCIUM 9.4 04/07/2019    GFRAA >60 04/07/2019    LABGLOM 57 04/07/2019     ABGs:  Lab Results   Component Value Date    PHART 7.404 01/24/2019    PO2ART 64.1 01/24/2019    PPN1CSU 46.1 01/24/2019      Lab Results   Component Value Date    MODE NOT REPORTED 01/24/2019     Ionized Calcium:  No results found for: IONCA  Magnesium:    Lab Results   Component Value Date    MG 1.8 12/27/2017      Phosphorus:  No results found for: PHOS     LIVER PROFILE No results for input(s): AST, ALT, LIPASE, BILIDIR, BILITOT, ALKPHOS in the last 72 hours. Invalid input(s): AMYLASE,  ALB  INR No results for input(s): INR in the last 72 hours. PTT No results for input(s): APTT in the last 72 hours. BNP No results for input(s): BNP in the last 72 hours.     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 Acute on chronic respiratory care with hypoxia, home O2 at 5 liters. 2.  Hypervolemia, acute on chronic diastolic/systolic CHF. 3.  Severe pulmonary hypertension, probably group 2 and 3.  4.  Pulmonary fibrosis diagnosed by chest CT according to patient. 5.  LIANET on home CPAP. 6.  History of tobacco abuse a pack a day for 15 years, quit over 30  years ago, been told to have COPD  but does not take any inhalers at home or bronchodilators. 7.  History of hypertension, coronary disease, CABG x4 and paroxysmal AFib, diastolic/systolic CHF. 9.  History of PE from the record on 07/2018. patient did not  acknowledge.     PLAN OF TREATMENT:    O2 , diuresis/negative fluid balance  . BiPAP as needed,   Eliquis.    will need PFTs as an outpatient with her pulmonologist   Add aerosol treatment If Not improving will need  high resolution chest CT   DNR-CCA, no intubation     Electronically signed by Kate Drummond MD on 4/7/2019 at 2:41 PM

## 2019-04-07 NOTE — CARE COORDINATION
ONGOING DISCHARGE PLAN:    Spoke with patient regarding discharge plan and patient confirms that plan is still to return home w/ [facility], [facility]. Pt. Would like to speak to Palliative Care Nurse for Information. Fauzia to see patricio. Pt. Is currently on the BIPAP at this time & remains on IV Lasix. Eliquis, PTA. Cardio/Pulm continue to follow. Will await PT/OT rec for DC needs. Will continue to follow for additional discharge needs.     Electronically signed by Alessandro Duran RN on 4/7/2019 at 2:21 PM

## 2019-04-07 NOTE — PROGRESS NOTES
[facility]YS[facility]IANS CARDIOLOGY Progress Note    2019 10:00 AM      Subjective:  Ms. Kayleen Matthews is diuresing well and on Bi Pap, still short of breath at rest, denies any chest pain or palpitations or lightheadedness. Review of systems:  No fever or chills. No headaches. Nurse updated overnight events. LABS:     Recent Results (from the past 24 hour(s))   Basic Metabolic Prof    Collection Time: 19  4:16 AM   Result Value Ref Range    Glucose 100 (H) 70 - 99 mg/dL    BUN 27 (H) 8 - 23 mg/dL    CREATININE 0.94 (H) 0.50 - 0.90 mg/dL    Bun/Cre Ratio NOT REPORTED 9 - 20    Calcium 9.4 8.6 - 10.4 mg/dL    Sodium 134 (L) 135 - 144 mmol/L    Potassium 4.7 3.7 - 5.3 mmol/L    Chloride 96 (L) 98 - 107 mmol/L    CO2 28 20 - 31 mmol/L    Anion Gap 10 9 - 17 mmol/L    GFR Non-African American 57 (L) >60 mL/min    GFR African American >60 >60 mL/min    GFR Comment          GFR Staging NOT REPORTED    CBC with DIFF    Collection Time: 19  4:16 AM   Result Value Ref Range    WBC 6.5 3.5 - 11.0 k/uL    RBC 4.49 4.0 - 5.2 m/uL    Hemoglobin 13.4 12.0 - 16.0 g/dL    Hematocrit 41.7 36 - 46 %    MCV 92.8 80 - 100 fL    MCH 29.8 26 - 34 pg    MCHC 32.2 31 - 37 g/dL    RDW 20.5 (H) 11.5 - 14.9 %    Platelets 267 764 - 462 k/uL    MPV 10.0 6.0 - 12.0 fL    NRBC Automated NOT REPORTED per 100 WBC    Differential Type NOT REPORTED     Immature Granulocytes NOT REPORTED 0 %    Absolute Immature Granulocyte NOT REPORTED 0.00 - 0.30 k/uL    WBC Morphology NOT REPORTED     RBC Morphology NOT REPORTED     Platelet Estimate NOT REPORTED     Seg Neutrophils 78 (H) 36 - 66 %    Lymphocytes 8 (L) 24 - 44 %    Monocytes 12 (H) 1 - 7 %    Eosinophils % 1 0 - 4 %    Basophils 1 0 - 2 %    Segs Absolute 5.10 1.3 - 9.1 k/uL    Absolute Lymph # 0.50 (L) 1.0 - 4.8 k/uL    Absolute Mono # 0.80 0.1 - 1.3 k/uL    Absolute Eos # 0.10 0.0 - 0.4 k/uL    Basophils # 0.10 0.0 - 0.2 k/uL       Pulse Ox:  SpO2  Av.7 %  Min: 86 %  Max: 94 %    Supplemental O2: O2 Flow Rate (L/min): 5 L/min     Current Meds:    potassium chloride  20 mEq Oral BID    ciprofloxacin  250 mg Oral 2 times per day    furosemide  20 mg Intravenous BID    apixaban  2.5 mg Oral BID    aspirin  81 mg Oral Daily    atorvastatin  80 mg Oral Daily    calcium carbonate-vitamin D  1 tablet Oral Daily    citalopram  20 mg Oral Daily    metoprolol succinate  100 mg Oral Daily    pantoprazole  40 mg Oral QAM AC    sodium chloride flush  10 mL Intravenous 2 times per day            VITAL SIGNS:    /72   Pulse 75   Temp 97.6 °F (36.4 °C) (Oral)   Resp 21   Ht 5' 6\" (1.676 m)   Wt 221 lb 1.9 oz (100.3 kg)   LMP 11/01/1985   SpO2 (!) 86%   BMI 35.69 kg/m²  5 L/min      Admit Weight:  210 lb (95.3 kg)    Last 3 weights: Wt Readings from Last 3 Encounters:   04/07/19 221 lb 1.9 oz (100.3 kg)   03/18/19 194 lb (88 kg)   02/19/19 189 lb (85.7 kg)       BMI: Body mass index is 35.69 kg/m². INPUT/OUTPUT:          Intake/Output Summary (Last 24 hours) at 4/7/2019 1000  Last data filed at 4/7/2019 0907  Gross per 24 hour   Intake 830 ml   Output 1200 ml   Net -370 ml         Telemetry shows sinus rhythm. EXAM:     General appearance: awake, alert. BiPap in place. Neck: + JVD. Chest: decreased air entry bilaterally with diminished breath sounds at bases. Cardiac: Regular rate and rhythm.  +systolic murmur. Abdomen: soft, non-tender. Extremities: + cyanosis of finger tips, + clubbing, no calf tenderness, + leg edema. Skin:  warm and dry. Neuro:  Able to move all 4 extremities. Chest x-ray 4/7/2019:  No significant interval change with redemonstration of bilateral  heterogeneous opacities and consolidations with bilateral pleural effusions    2-D echocardiogram March 2019:    The estimated left ventricular ejection fraction is 45 - 50%. Mildly   reduced left ventricular ejection fraction.   · The right ventricle is severely dilated  · Moderately to severely reduced right ventricular systolic function. · Mild-to-moderate mitral regurgitation. · . Severe tricuspid valve regurgitation. Regurgitation jet is central.   Estimated RVSP: 70-75 mmHg. · Bilateral pleural effusion. Ascites present. ASSESSMENT:    Acute on chronic Right and left heart failure with chronic cor pulmonale. LVEF 45-50%. Severe tricuspid regurgitation, severe pulmonary hypertension with RVSP 70-75 mm Hg, severely dilated right 23, moderate to severe RV systolic dysfunction. Chronic atrial fibrillation. On chronic oral anticoagulation using Eliquis 2.5 mg twice a day. Atherosclerotic coronary artery disease, CABG twice in 1992 in 2010. Chronic shortness of breath at rest with idiopathic pulmonary fibrosis, associated to peripheral cyanosis and clubbing of the fingers. Other problems as charted. REC/PLAN:    Slow improvement. We'll continue on IV Lasix, potassium supplements, aspirin 81 mg daily, Toprol- mg daily, Eliquis 2.5 mg twice a day, atorvastatin 80 mg daily, O2 and other supportive care. Check serial labs as ordered. Overall very poor prognosis as documented previously. No family members available at bedside to discuss. We'll follow. Electronically signed by Albina Kawasaki MD, [facility] [place]        PLEASE NOTE:  This progress note was completed using a voice transcription system. Every effort was made to ensure accuracy. However, inadvertent computerized transcription errors may be present.

## 2019-04-07 NOTE — FLOWSHEET NOTE
04/07/19 1228   Encounter Summary   Services provided to: Patient   Referral/Consult From: Todd [facility] Visiting   (4/7/19v)   Volunteer Visit Yes   Complexity of Encounter Low   Length of Encounter 15 minutes   Routine   Type Follow up   Spiritual/Jewish   Type Spiritual support   Intervention [address] Patient received communion

## 2019-04-07 NOTE — PROGRESS NOTES
FAMILY MEDICINE  - PROGRESS NOTE    Date:  4/7/2019  Michael Forbes  [id number]      Chief Complaint   Patient presents with    Shortness of Breath         Interval History:  not changed much, she has no new complaints. Currently on BiPAP.       Subjective  Respiratory: positive for emphysema and shortness of breath  Cardiovascular: positive for irregular heart beat and lower extremity edema  Musculoskeletal:positive for arthralgias, myalgias and stiff joints  Behavioral/Psych: positive for anxiety and obesity:    Objective:    BP (!) 86/44   Pulse 69   Temp 98.4 °F (36.9 °C) (Oral)   Resp 22   Ht 5' 6\" (1.676 m)   Wt 221 lb 1.9 oz (100.3 kg)   LMP 11/01/1985   SpO2 93%   BMI 35.69 kg/m²   General appearance - alert, well appearing, and in no distress and overweight  Mental status - alert, oriented to person, place, and time  Eyes - pupils equal and reactive, extraocular eye movements intact  Ears - hearing grossly normal bilaterally  Nose - normal and patent, no erythema, discharge or polyps  Mouth - mucous membranes moist, pharynx normal without lesions  Neck - supple, no significant adenopathy  Lymphatics - no palpable lymphadenopathy, no hepatosplenomegaly  Chest - decreased air entry noted posteriorly  Heart - irregularly irregular rhythm with rate 69  Abdomen - soft, nontender, nondistended, no masses or organomegaly  Breasts - not examined  Back exam - full range of motion, no tenderness, palpable spasm or pain on motion  Neurological - alert, oriented, normal speech, no focal findings or movement disorder noted  Musculoskeletal - osteoarthritic changes noted in both hands  Extremities - pedal edema 1 +  Skin - normal coloration and turgor, no rashes, no suspicious skin lesions noted    Data:   Medications:   Current Facility-Administered Medications   Medication Dose Route Frequency Provider Last Rate Last Dose    potassium chloride (MICRO-K) Net -595 ml     Recent Results (from the past 24 hour(s))   Basic Metabolic Prof    Collection Time: 04/07/19  4:16 AM   Result Value Ref Range    Glucose 100 (H) 70 - 99 mg/dL    BUN 27 (H) 8 - 23 mg/dL    CREATININE 0.94 (H) 0.50 - 0.90 mg/dL    Bun/Cre Ratio NOT REPORTED 9 - 20    Calcium 9.4 8.6 - 10.4 mg/dL    Sodium 134 (L) 135 - 144 mmol/L    Potassium 4.7 3.7 - 5.3 mmol/L    Chloride 96 (L) 98 - 107 mmol/L    CO2 28 20 - 31 mmol/L    Anion Gap 10 9 - 17 mmol/L    GFR Non-African American 57 (L) >60 mL/min    GFR African American >60 >60 mL/min    GFR Comment          GFR Staging NOT REPORTED    CBC with DIFF    Collection Time: 04/07/19  4:16 AM   Result Value Ref Range    WBC 6.5 3.5 - 11.0 k/uL    RBC 4.49 4.0 - 5.2 m/uL    Hemoglobin 13.4 12.0 - 16.0 g/dL    Hematocrit 41.7 36 - 46 %    MCV 92.8 80 - 100 fL    MCH 29.8 26 - 34 pg    MCHC 32.2 31 - 37 g/dL    RDW 20.5 (H) 11.5 - 14.9 %    Platelets 480 536 - 700 k/uL    MPV 10.0 6.0 - 12.0 fL    NRBC Automated NOT REPORTED per 100 WBC    Differential Type NOT REPORTED     Immature Granulocytes NOT REPORTED 0 %    Absolute Immature Granulocyte NOT REPORTED 0.00 - 0.30 k/uL    WBC Morphology NOT REPORTED     RBC Morphology NOT REPORTED     Platelet Estimate NOT REPORTED     Seg Neutrophils 78 (H) 36 - 66 %    Lymphocytes 8 (L) 24 - 44 %    Monocytes 12 (H) 1 - 7 %    Eosinophils % 1 0 - 4 %    Basophils 1 0 - 2 %    Segs Absolute 5.10 1.3 - 9.1 k/uL    Absolute Lymph # 0.50 (L) 1.0 - 4.8 k/uL    Absolute Mono # 0.80 0.1 - 1.3 k/uL    Absolute Eos # 0.10 0.0 - 0.4 k/uL    Basophils # 0.10 0.0 - 0.2 k/uL     -----------------------------------------------------------------  RAD:  EKG:  Micro:     Assessment & Plan:    Patient Active Problem List:     GERD (gastroesophageal reflux disease)     Anxiety     Lipids abnormal     Hypertension     Heart disease     Atrial fibrillation (HCC)     Pulmonary fibrosis (HCC)     Pneumonia     Mixed hyperlipidemia Acute cystitis without hematuria     Interstitial lung disease (HCC)     Centrilobular emphysema (HCC)     Pulmonary hypertension (HCC)     Chronic diastolic CHF (congestive heart failure) (HCC)     Pulmonary embolus (HCC)     CAD (coronary artery disease), native coronary artery     Hypertensive heart disease without heart failure     Hypotension     Hypotension, unspecified     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     Iatrogenic hypotension     Lung nodule, solitary     S/P CABG x 4     Acute on chronic respiratory failure with hypoxemia (HCC)     Hypoxia     Moderate malnutrition (HCC)     CHF, left ventricular (HCC)     Acute on chronic diastolic CHF (congestive heart failure) ([facility] [address]           Plan:  Acute on chronic CHF - improving, further plans per Cardiology. COPD & Pulmonary fibrosis - on BiPAP, further plans per Pulmonology. Continue current treatments. Complete orders per chart.     See orders   Disposition:    Electronically signed by Sherine Del Toro MD on 4/7/2019 at 9:24 AM

## 2019-04-07 NOTE — FLOWSHEET NOTE
04/06/19 1239   Encounter Summary   Services provided to: Patient   Referral/Consult From: Todd   Continue Visiting   (4/6/19V)   Volunteer Visit Yes   Complexity of Encounter Low   Length of Encounter 15 minutes   Routine   Type Follow up   Spiritual/Advent   Type Spiritual support   Intervention [address] Patient received communion

## 2019-04-08 LAB
ABSOLUTE EOS #: 0 K/UL (ref 0–0.4)
ABSOLUTE IMMATURE GRANULOCYTE: ABNORMAL K/UL (ref 0–0.3)
ABSOLUTE LYMPH #: 0.4 K/UL (ref 1–4.8)
ABSOLUTE MONO #: 0.6 K/UL (ref 0.1–1.3)
ANION GAP SERPL CALCULATED.3IONS-SCNC: 8 MMOL/L (ref 9–17)
BASOPHILS # BLD: 0 % (ref 0–2)
BASOPHILS ABSOLUTE: 0 K/UL (ref 0–0.2)
BUN BLDV-MCNC: 26 MG/DL (ref 8–23)
BUN/CREAT BLD: ABNORMAL (ref 9–20)
CALCIUM SERPL-MCNC: 9.2 MG/DL (ref 8.6–10.4)
CHLORIDE BLD-SCNC: 94 MMOL/L (ref 98–107)
CO2: 31 MMOL/L (ref 20–31)
CREAT SERPL-MCNC: 1.03 MG/DL (ref 0.5–0.9)
DIFFERENTIAL TYPE: ABNORMAL
EOSINOPHILS RELATIVE PERCENT: 0 % (ref 0–4)
GFR AFRICAN AMERICAN: >60 ML/MIN
GFR NON-AFRICAN AMERICAN: 51 ML/MIN
GFR SERPL CREATININE-BSD FRML MDRD: ABNORMAL ML/MIN/{1.73_M2}
GFR SERPL CREATININE-BSD FRML MDRD: ABNORMAL ML/MIN/{1.73_M2}
GLUCOSE BLD-MCNC: 116 MG/DL (ref 70–99)
HCT VFR BLD CALC: 42.2 % (ref 36–46)
HEMOGLOBIN: 13.5 G/DL (ref 12–16)
IMMATURE GRANULOCYTES: ABNORMAL %
LYMPHOCYTES # BLD: 7 % (ref 24–44)
MCH RBC QN AUTO: 29.5 PG (ref 26–34)
MCHC RBC AUTO-ENTMCNC: 32 G/DL (ref 31–37)
MCV RBC AUTO: 92.3 FL (ref 80–100)
MONOCYTES # BLD: 10 % (ref 1–7)
NRBC AUTOMATED: ABNORMAL PER 100 WBC
PDW BLD-RTO: 20.5 % (ref 11.5–14.9)
PLATELET # BLD: 170 K/UL (ref 150–450)
PLATELET ESTIMATE: ABNORMAL
PMV BLD AUTO: 9.2 FL (ref 6–12)
POTASSIUM SERPL-SCNC: 4.5 MMOL/L (ref 3.7–5.3)
RBC # BLD: 4.57 M/UL (ref 4–5.2)
RBC # BLD: ABNORMAL 10*6/UL
SEG NEUTROPHILS: 83 % (ref 36–66)
SEGMENTED NEUTROPHILS ABSOLUTE COUNT: 5.4 K/UL (ref 1.3–9.1)
SODIUM BLD-SCNC: 133 MMOL/L (ref 135–144)
WBC # BLD: 6.5 K/UL (ref 3.5–11)
WBC # BLD: ABNORMAL 10*3/UL

## 2019-04-08 PROCEDURE — 97162 PT EVAL MOD COMPLEX 30 MIN: CPT

## 2019-04-08 PROCEDURE — 6360000002 HC RX W HCPCS: Performed by: INTERNAL MEDICINE

## 2019-04-08 PROCEDURE — 85025 COMPLETE CBC W/AUTO DIFF WBC: CPT

## 2019-04-08 PROCEDURE — 2580000003 HC RX 258: Performed by: STUDENT IN AN ORGANIZED HEALTH CARE EDUCATION/TRAINING PROGRAM

## 2019-04-08 PROCEDURE — 99232 SBSQ HOSP IP/OBS MODERATE 35: CPT | Performed by: FAMILY MEDICINE

## 2019-04-08 PROCEDURE — 36415 COLL VENOUS BLD VENIPUNCTURE: CPT

## 2019-04-08 PROCEDURE — 6370000000 HC RX 637 (ALT 250 FOR IP): Performed by: INTERNAL MEDICINE

## 2019-04-08 PROCEDURE — 2060000000 HC ICU INTERMEDIATE R&B

## 2019-04-08 PROCEDURE — 2700000000 HC OXYGEN THERAPY PER DAY

## 2019-04-08 PROCEDURE — 97116 GAIT TRAINING THERAPY: CPT

## 2019-04-08 PROCEDURE — 97166 OT EVAL MOD COMPLEX 45 MIN: CPT

## 2019-04-08 PROCEDURE — 97530 THERAPEUTIC ACTIVITIES: CPT

## 2019-04-08 PROCEDURE — 6370000000 HC RX 637 (ALT 250 FOR IP): Performed by: FAMILY MEDICINE

## 2019-04-08 PROCEDURE — 94660 CPAP INITIATION&MGMT: CPT

## 2019-04-08 PROCEDURE — 80048 BASIC METABOLIC PNL TOTAL CA: CPT

## 2019-04-08 PROCEDURE — 94640 AIRWAY INHALATION TREATMENT: CPT

## 2019-04-08 PROCEDURE — 6370000000 HC RX 637 (ALT 250 FOR IP): Performed by: STUDENT IN AN ORGANIZED HEALTH CARE EDUCATION/TRAINING PROGRAM

## 2019-04-08 RX ADMIN — APIXABAN 2.5 MG: 2.5 TABLET, FILM COATED ORAL at 09:21

## 2019-04-08 RX ADMIN — POTASSIUM CHLORIDE 20 MEQ: 750 CAPSULE, EXTENDED RELEASE ORAL at 09:20

## 2019-04-08 RX ADMIN — FUROSEMIDE 20 MG: 10 INJECTION, SOLUTION INTRAMUSCULAR; INTRAVENOUS at 18:20

## 2019-04-08 RX ADMIN — CIPROFLOXACIN HYDROCHLORIDE 250 MG: 250 TABLET, FILM COATED ORAL at 19:35

## 2019-04-08 RX ADMIN — Medication 10 ML: at 09:05

## 2019-04-08 RX ADMIN — CIPROFLOXACIN HYDROCHLORIDE 250 MG: 250 TABLET, FILM COATED ORAL at 09:21

## 2019-04-08 RX ADMIN — IPRATROPIUM BROMIDE AND ALBUTEROL SULFATE 1 AMPULE: .5; 3 SOLUTION RESPIRATORY (INHALATION) at 11:01

## 2019-04-08 RX ADMIN — FUROSEMIDE 20 MG: 10 INJECTION, SOLUTION INTRAMUSCULAR; INTRAVENOUS at 09:21

## 2019-04-08 RX ADMIN — Medication 1 TABLET: at 09:21

## 2019-04-08 RX ADMIN — METOPROLOL SUCCINATE 100 MG: 100 TABLET, EXTENDED RELEASE ORAL at 09:20

## 2019-04-08 RX ADMIN — Medication 10 ML: at 19:34

## 2019-04-08 RX ADMIN — IPRATROPIUM BROMIDE AND ALBUTEROL SULFATE 1 AMPULE: .5; 3 SOLUTION RESPIRATORY (INHALATION) at 16:01

## 2019-04-08 RX ADMIN — IPRATROPIUM BROMIDE AND ALBUTEROL SULFATE 1 AMPULE: .5; 3 SOLUTION RESPIRATORY (INHALATION) at 19:43

## 2019-04-08 RX ADMIN — CITALOPRAM HYDROBROMIDE 20 MG: 20 TABLET ORAL at 09:20

## 2019-04-08 RX ADMIN — PANTOPRAZOLE SODIUM 40 MG: 40 TABLET, DELAYED RELEASE ORAL at 09:20

## 2019-04-08 RX ADMIN — ASPIRIN 81 MG: 81 TABLET, COATED ORAL at 09:21

## 2019-04-08 RX ADMIN — POTASSIUM CHLORIDE 20 MEQ: 750 CAPSULE, EXTENDED RELEASE ORAL at 19:39

## 2019-04-08 RX ADMIN — APIXABAN 2.5 MG: 2.5 TABLET, FILM COATED ORAL at 19:34

## 2019-04-08 RX ADMIN — IPRATROPIUM BROMIDE AND ALBUTEROL SULFATE 1 AMPULE: .5; 3 SOLUTION RESPIRATORY (INHALATION) at 07:02

## 2019-04-08 RX ADMIN — ATORVASTATIN CALCIUM 80 MG: 80 TABLET, FILM COATED ORAL at 19:34

## 2019-04-08 RX ADMIN — LORAZEPAM 0.5 MG: 0.5 TABLET ORAL at 23:46

## 2019-04-08 ASSESSMENT — PAIN SCALES - GENERAL
PAINLEVEL_OUTOF10: 0

## 2019-04-08 NOTE — FLOWSHEET NOTE
04/08/19 1114   Encounter Summary   Services provided to: Patient   Referral/Consult From: Todd Louie Visiting   (4/8/19V)   Volunteer Visit Yes   Complexity of Encounter Low   Length of Encounter 15 minutes   Routine   Type Follow up   Spiritual/Cheondoism   Type Spiritual support   Intervention [address] Patient received communion

## 2019-04-08 NOTE — PROGRESS NOTES
Pulmonary Progress Note  Pulmonary and Critical Care Specialists      Patient - William Zhang,  Age - 78 y.o.    - 1940      Room Number -    MRN -  [id number]   Acct # - [de-identified]  Date of Admission -  2019  4:16 PM    Consulting 838 Loly Sanchez MD  Primary Care Physician - Edu Perez MD     SUBJECTIVE   No distress  On 6 liters nasal cannula  states she feels a little better from yesterday    OBJECTIVE   VITALS    height is 5' 6\" (1.676 m) and weight is 218 lb 11.1 oz (99.2 kg). Her temperature is 98 °F (36.7 °C). Her blood pressure is 119/62 and her pulse is 76. Her respiration is 19 and oxygen saturation is 95%. Body mass index is 35.3 kg/m². Temperature Range: Temp: 98 °F (36.7 °C) Temp  Av.7 °F (36.5 °C)  Min: 97.2 °F (36.2 °C)  Max: 98 °F (36.7 °C)  BP Range:  Systolic (23DIU), XZF:901 , Min:102 , XKU:598     Diastolic (38FIY), YRQ:64, Min:48, Max:84    Pulse Range: Pulse  Av.2  Min: 65  Max: 83  Respiration Range: Resp  Av.2  Min: 15  Max: 26  Current Pulse Ox[de-identified]  SpO2: 95 %  24HR Pulse Ox Range:  SpO2  Av.6 %  Min: 85 %  Max: 96 %  Oxygen Amount and Delivery: O2 Flow Rate (L/min): 5 L/min    Wt Readings from Last 3 Encounters:   19 218 lb 11.1 oz (99.2 kg)   19 194 lb (88 kg)   19 189 lb (85.7 kg)       I/O (24 Hours)    Intake/Output Summary (Last 24 hours) at 2019 1245  Last data filed at 2019 0500  Gross per 24 hour   Intake 2173 ml   Output 1100 ml   Net 1073 ml       EXAM     General Appearance  Awake, alert, oriented, in no acute distress  HEENT - normocephalic, atraumatic. Neck - Supple,  trachea midline   Lungs - coarse breath sounds   Heart Exam:PMI normal. No lifts, heaves, or thrills. RRR. No murmurs, clicks, gallops, or rubs  Abdomen Exam: Abdomen soft, non-tender.   Extremity Exam: No cyanosis    MEDS      ipratropium-albuterol  1 ampule Inhalation Q4H While awake    potassium hypertension (HCC)    Chronic diastolic CHF (congestive heart failure) (HCC)    Pulmonary embolus (HCC)    CAD (coronary artery disease), native coronary artery    Hypertensive heart disease without heart failure    Hypotension    Hypotension, unspecified    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    Iatrogenic hypotension    Lung nodule, solitary    S/P CABG x 4    Acute on chronic respiratory failure with hypoxemia (HCC)    Hypoxia    Moderate malnutrition (HCC)    CHF, left ventricular (HCC)    Acute on chronic diastolic CHF (congestive heart failure) ([facility] [address]     Patient sees Dr. Sue Zhang , at [facility]. She was going to start the research drug for pulmonary fibrosis this week. On Eliquis for chronic atrial fibrillation  On medications for acute on chronic right and left heart failure with chronic cor pulmonology.     Electronically signed by Sarita Aguayo MD on 4/8/2019 at 12:45 PM

## 2019-04-08 NOTE — PROGRESS NOTES
Q8H DANICA Cid MD           Intake/Output Summary (Last 24 hours) at 4/8/2019 0603  Last data filed at 4/8/2019 0500  Gross per 24 hour   Intake 2403 ml   Output 1100 ml   Net 1303 ml     Recent Results (from the past 24 hour(s))   Basic Metabolic Prof    Collection Time: 04/08/19  4:21 AM   Result Value Ref Range    Glucose 116 (H) 70 - 99 mg/dL    BUN 26 (H) 8 - 23 mg/dL    CREATININE 1.03 (H) 0.50 - 0.90 mg/dL    Bun/Cre Ratio NOT REPORTED 9 - 20    Calcium 9.2 8.6 - 10.4 mg/dL    Sodium 133 (L) 135 - 144 mmol/L    Potassium 4.5 3.7 - 5.3 mmol/L    Chloride 94 (L) 98 - 107 mmol/L    CO2 31 20 - 31 mmol/L    Anion Gap 8 (L) 9 - 17 mmol/L    GFR Non-African American 51 (L) >60 mL/min    GFR African American >60 >60 mL/min    GFR Comment          GFR Staging NOT REPORTED    CBC with DIFF    Collection Time: 04/08/19  4:21 AM   Result Value Ref Range    WBC 6.5 3.5 - 11.0 k/uL    RBC 4.57 4.0 - 5.2 m/uL    Hemoglobin 13.5 12.0 - 16.0 g/dL    Hematocrit 42.2 36 - 46 %    MCV 92.3 80 - 100 fL    MCH 29.5 26 - 34 pg    MCHC 32.0 31 - 37 g/dL    RDW 20.5 (H) 11.5 - 14.9 %    Platelets 997 784 - 105 k/uL    MPV 9.2 6.0 - 12.0 fL    NRBC Automated NOT REPORTED per 100 WBC    Differential Type NOT REPORTED     Immature Granulocytes NOT REPORTED 0 %    Absolute Immature Granulocyte NOT REPORTED 0.00 - 0.30 k/uL    WBC Morphology NOT REPORTED     RBC Morphology NOT REPORTED     Platelet Estimate NOT REPORTED     Seg Neutrophils 83 (H) 36 - 66 %    Lymphocytes 7 (L) 24 - 44 %    Monocytes 10 (H) 1 - 7 %    Eosinophils % 0 0 - 4 %    Basophils 0 0 - 2 %    Segs Absolute 5.40 1.3 - 9.1 k/uL    Absolute Lymph # 0.40 (L) 1.0 - 4.8 k/uL    Absolute Mono # 0.60 0.1 - 1.3 k/uL    Absolute Eos # 0.00 0.0 - 0.4 k/uL    Basophils # 0.00 0.0 - 0.2 k/uL     -----------------------------------------------------------------  RAD:  EKG:  Micro:     Assessment & Plan:    Patient Active Problem List:     GERD (gastroesophageal reflux disease)     Anxiety     Lipids abnormal     Hypertension     Heart disease     Atrial fibrillation (HCC)     Pulmonary fibrosis (HCC)     Pneumonia     Mixed hyperlipidemia     Acute cystitis without hematuria     Interstitial lung disease (HCC)     Centrilobular emphysema (HCC)     Pulmonary hypertension (HCC)     Chronic diastolic CHF (congestive heart failure) (HCC)     Pulmonary embolus (HCC)     CAD (coronary artery disease), native coronary artery     Hypertensive heart disease without heart failure     Hypotension     Hypotension, unspecified     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     Iatrogenic hypotension     Lung nodule, solitary     S/P CABG x 4     Acute on chronic respiratory failure with hypoxemia (HCC)     Hypoxia     Moderate malnutrition (HCC)     CHF, left ventricular (HCC)     Acute on chronic diastolic CHF (congestive heart failure) ([facility] [address]           Plan:  Acute on chronic CHF - slowly improving. COPD & Pulmonary fibrosis - on BiPAP. Continue current treatments. Palliative care consulted. Complete orders per chart.       See orders   Disposition:    Electronically signed by Lam Ferreira MD on 4/8/2019 at 6:03 AM

## 2019-04-08 NOTE — CARE COORDINATION
ONGOING DISCHARGE PLAN:    Spoke with patient regarding discharge plan and patient confirms that plan is still to discharge to home with vns  Per Pulm  Patient sees Dr. Ryan Anderson , at [facility] ([facility]), [address] [place] [address]. She was going to start the research drug for pulmonary fibrosis this week.     On Eliquis for chronic atrial fibrillation  On medications for acute on chronic right and left heart failure with chronic cor pulmonology        Will continue to follow for additional discharge needs.     Electronically signed by Olga Nielsen RN on 4/8/2019 at 1:44 PM

## 2019-04-08 NOTE — FLOWSHEET NOTE
Patient expressed frustration that she gets SOB with any activity and noted it is hard to be optimistic when she is weak. We talked about God's plans, and patient said she wasn't too keen on His process sometimes. Patient was pleasant and appreciative of prayer and listening presence. 04/08/19 1204   Encounter Summary   Services provided to: Patient   Referral/Consult From: Palliative Care   Continue Visiting   (4-8-19)   Complexity of Encounter Moderate   Length of Encounter 15 minutes   Spiritual Assessment Completed Yes   Routine   Type Follow up   Spiritual/Synagogue   Type Spiritual support   Assessment Anxious; Approachable   Intervention Active listening;Explored feelings, thoughts, concerns;Nurtured hope;Prayer;Sustaining presence/ Ministry of presence; Discussed illness/injury and it's impact   Outcome Expressed gratitude;Comfort;Expressed feelings/needs/concerns;Receptive

## 2019-04-08 NOTE — FLOWSHEET NOTE
Dr Ramirez Cea updated with patients low saturation on nasal canula, new order for high flow oxygen received

## 2019-04-08 NOTE — PROGRESS NOTES
.. PALLIATIVE CARE NURSING ASSESSMENT    Patient: Quinten Durbin  Room: 2013/2013-01    Reason For Consult   Goals of care evaluation  Distress management  Guidance and support  Facilitate communications  Assistance in coordinating care      Impression: Quinten Durbin is a 78y.o. year old female  has a past medical history of Acute cystitis without hematuria, Anxiety, Atrial fibrillation ([address] Orantes's esophagus, Centrilobular emphysema ([address] Chronic diastolic CHF (congestive heart failure) ([address] Chronic hypoxemic respiratory failure ([address] GERD (gastroesophageal reflux disease), Heart disease, Hypertension, Hypotension, unspecified, Iatrogenic hypotension, Interstitial lung disease ([address] Lipids abnormal, Mixed hyperlipidemia, LIANET on CPAP, Pneumonia, Pulmonary embolus ([address] Pulmonary fibrosis ([address] Pulmonary hypertension ([address] S/P CABG x 4, and UIP (usual interstitial pneumonitis) ([address] .  Currently hospitalized for the management of CHF. The Palliative Care Team is following to assist with goals of care/chronic disease support. Vital Signs  Blood pressure 119/62, pulse 76, temperature 98 °F (36.7 °C), resp. rate 24, height 5' 6\" (1.676 m), weight 218 lb 11.1 oz (99.2 kg), last menstrual period 11/01/1985, SpO2 93 %, not currently breastfeeding.     Patient Active Problem List   Diagnosis    GERD (gastroesophageal reflux disease)    Anxiety    Lipids abnormal    Hypertension    Heart disease    Atrial fibrillation ([address]    Pulmonary fibrosis ([address]    Pneumonia    Mixed hyperlipidemia    Acute cystitis without hematuria    Interstitial lung disease ([address]    Centrilobular emphysema ([address]    Pulmonary hypertension (HCC)    Chronic diastolic CHF (congestive heart failure) (HCC)    Pulmonary embolus (HCC)    CAD (coronary artery disease), native coronary artery    Hypertensive heart disease without heart failure    Hypotension    Hypotension, unspecified    Atherosclerosis of autologous

## 2019-04-08 NOTE — PROGRESS NOTES
[address]    Occupational Therapy Evaluation  Date: 19  Patient Name: Noble Alaniz       Room:   MRN: [id number]  Account: [de-identified]   : 1940  (78 y.o.) Gender: female     Discharge Recommendations:  [address], Continue to assess pending progress(w/pulmonary rehab )    Referring Practitioner: Dr. Rafaela Mathur  Diagnosis: CHF  Additional Pertinent Hx: Pulmonary fibrosis, oxygen dependent    Treatment Diagnosis: Impaired self-care status  Past Medical History:  has a past medical history of Acute cystitis without hematuria, Anxiety, Atrial fibrillation ([address] Orantes's esophagus, Centrilobular emphysema ([address] Chronic diastolic CHF (congestive heart failure) ([address] Chronic hypoxemic respiratory failure ([address] GERD (gastroesophageal reflux disease), Heart disease, Hypertension, Hypotension, unspecified, Iatrogenic hypotension, Interstitial lung disease ([address] Lipids abnormal, Mixed hyperlipidemia, LIANET on CPAP, Pneumonia, Pulmonary embolus ([address] Pulmonary fibrosis ([address] Pulmonary hypertension ([address] S/P CABG x 4, and UIP (usual interstitial pneumonitis) ([address] Past Surgical History:   has a past surgical history that includes Breast biopsy (); Cardiac surgery (; ); Cholecystectomy (); Cardioversion; and Bypass Graft (N/A).     Restrictions  Restrictions/Precautions: Fall Risk  Implants present? : Metal implants(strnal wires from CABG)  Required Braces or Orthoses?: No     Vitals  Temp: 97.4 °F (36.3 °C)  Pulse: 73  Resp: 23  BP: (!) 123/92  Height: 5' 6\" (167.6 cm)  Weight: 218 lb 11.1 oz (99.2 kg)  BMI (Calculated): 35.4  Oxygen Therapy  SpO2: 95 %  Pulse Oximeter Device Mode: Continuous  Pulse Oximeter Device Location: Finger  O2 Device: PAP (positive airway pressure)  Skin Protection for O2 Device: Yes  FiO2 : 50 %  O2 Flow Rate (L/min): 5 L/min  Blood Gas  Performed?: No  Level of Consciousness: Alert    Subjective  Subjective: arabella-care)  UE Dressing: Setup, Increased time to complete  LE Dressing: Maximum assistance(A for foot level dressing)  Toileting: Dependent/Total(External catheter in place)  Additional Comments: Pt with difficulty completing foot level ADL's 2* limited mobility and increased SOB/decreased O2 sats with exertion. Pt requires increased time for all tasks 2* decreased activity tolerance and decreased O2 sats with minimal exertion. Pt CGA for standing tasks including hygiene of arabella-area and buttocks. Other levels based on observation and clinical reasoning. UE Function  LUE Strength  Gross LUE Strength: WFL  L Hand Grasp: 4+/5     LUE Tone: Normotonic     LUE AROM (degrees)  LUE AROM : WFL     Left Hand AROM (degrees)  Left Hand AROM: WFL  RUE Strength  Gross RUE Strength: WFL  R Hand Grasp: 4+/5      RUE Tone: Normotonic     RUE AROM (degrees)  RUE AROM : WFL     Right Hand AROM (degrees)  Right Hand AROM: WFL    Fine Motor Skills  Coordination  Movements Are Fluid And Coordinated: Yes     Mobility  Sit to Supine: Maximum assistance   Stand Pivot Transfers: Contact guard assistance   Balance  Sitting Balance: Independent  Standing Balance: Contact guard assistance  Standing Balance  Sit to stand: Contact guard assistance  Stand to sit: Contact guard assistance     Bed mobility  Sit to Supine: Maximum assistance  Comment: Sitting EOB IND eating lunch when OT arrived. Transfers  Stand Step Transfers: Contact guard assistance  Stand Pivot Transfers: Contact guard assistance  Sit to stand: Contact guard assistance  Stand to sit: Contact guard assistance  Transfer Comments: w/RW  Functional Activity Tolerance  Functional Activity Tolerance: Tolerates < 10 min exercise w/ changes in vital signs  Additional Comments: Pt's O2 sats dropped as low as 75% with minimal exertion while sitting EOB and standing with RW while on 6L nc.   Pt tolerates low O2 sats with minimal symptoms, but reports she is normally in the high 80's/low 90's on 5L at home. Pt placed back on Bipap by RN at end of session. Assessment  Performance deficits / Impairments: Decreased functional mobility , Decreased ADL status, Decreased endurance, Decreased balance, Decreased high-level IADLs  Treatment Diagnosis: Impaired self-care status  Prognosis: Good  Decision Making: Medium Complexity  Patient Education: OT POC, safety, transfer technique, EC/WS  REQUIRES OT FOLLOW UP: Yes  Discharge Recommendations: [address], Continue to assess pending progress(w/pulmonary rehab )  Activity Tolerance: Treatment limited secondary to medical complications (free text)    Goals  Patient Goals   Patient goals : Pt hoping to return home. Short term goals  Time Frame for Short term goals: By Discharge  Short term goal 1: Pt will actively participate in self-care routine while sitting EOB or in chair and maintaining O2 levels 88%+. Short term goal 2: Pt will V/D good understanding of AE/modified techniques for LB ADL's and complete tasks with Min A. Short term goal 3: Pt will complete toilet transfer and toileting with SBA and Good safety using appropriate DME. Short term goal 4: Pt will V/D good understanding of EC/WS that she can utilize during daily routines. Short term goal 5: Pt will actively participate in 15-20 minutes of therapeutic exercise/activity to promote increased independence and safety with self-care and mobility.     Plan  Safety Devices  Safety Devices in place: Yes  Type of devices: Patient at risk for falls, Gait belt, Left in bed, Call light within reach, Nurse notified     Plan  Times per week: 5-7  Times per day: Daily  Current Treatment Recommendations: Balance Training, Functional Mobility Training, Endurance Training, Safety Education & Training, Patient/Caregiver Education & Training, Equipment Evaluation, Education, & procurement, Self-Care / ADL    Equipment Recommendations  Equipment Needed: (TBD)  OT Individual Minutes  Time In: 6721  Time Out: 1310  Minutes: 33    Electronically signed by James Martines on 4/8/19 at 1:36 PM

## 2019-04-08 NOTE — PROGRESS NOTES
Physical Therapy    Facility/Department: [facility] ICU  Initial Assessment    NAME: Luana Calderon  : 1940  MRN: [id number]    Date of Service: 2019    Discharge Recommendations:  Subacute/Skilled Nursing Facility(SNF w/ pulmonary rehab)   PT Equipment Recommendations  Equipment Needed: No    Assessment   Body structures, Functions, Activity limitations: Decreased functional mobility ; Decreased strength;Decreased endurance;Decreased ROM; Decreased balance  Assessment: pt would benefit from SNF w/ pulmonary rehab at D/C  Treatment Diagnosis: impaired mobility  Specific instructions for Next Treatment: 19 SNF W/ PULMONARY REHAB; gait w/ w walker 5' x 3 forward and back w/ CGA x 1 w/ O2 at 6 L, watch O2 sats  Prognosis: Fair  Decision Making: Medium Complexity  History: admitted due to C/O difficulty breathing x 7 days and LE edema  Exam: ROM, MMT, balance and mobility assessments  Clinical Presentation: gait w/ w walker 5' x 3 forward and back w/ CGA x 1 w/ O2 at 6 L, watch O2 sats  Patient Education: POC  Barriers to Learning: none  REQUIRES PT FOLLOW UP: Yes  Activity Tolerance  Activity Tolerance: Patient limited by fatigue;Patient limited by endurance       Patient Diagnosis(es): The encounter diagnosis was Congestive heart failure, unspecified HF chronicity, unspecified heart failure type ([address] has a past medical history of Acute cystitis without hematuria, Anxiety, Atrial fibrillation ([address] Orantes's esophagus, Centrilobular emphysema (HCC), Chronic diastolic CHF (congestive heart failure) ([address] Chronic hypoxemic respiratory failure ([address] GERD (gastroesophageal reflux disease), Heart disease, Hypertension, Hypotension, unspecified, Iatrogenic hypotension, Interstitial lung disease ([address] Lipids abnormal, Mixed hyperlipidemia, LIANET on CPAP, Pneumonia, Pulmonary embolus ([address] Pulmonary fibrosis ([address] Pulmonary hypertension ([address] S/P CABG x 4, and UIP (usual interstitial pneumonitis) ([address]    has a approximately 20', uses rollator and O2 at 5 L)  Transfer Assistance: Independent  Active : No(doesn't drive in the winter)  Occupation: Retired  Additional Comments: 2 sons assist her as needed  Cognition        Objective     Observation/Palpation  Observation: sitting at the EOB eating lunch tray, O2 per NC while eating- switched to bi-pap  Edema: edema bilateral LEs    AROM RLE (degrees)  RLE AROM: WFL  AROM LLE (degrees)  LLE AROM : WFL  AROM RUE (degrees)  RUE General AROM: see OT for UE assessment  AROM LUE (degrees)  LUE General AROM: see OT for UE assessment  Strength RLE  Comment: grossly 4/5  Strength LLE  Comment: grossly 4/5  Strength RUE  Comment: see OT for UE assessment  Strength LUE  Comment: see OT for UE assessment     Sensation  Overall Sensation Status: (C/O occassional numbness bilateral hands)  Bed mobility  Sit to Supine: Maximum assistance(assist to lift bilateral legs into bed)  Scooting: Maximal assistance;2 Person assistance(to boost up in bed)  Comment: sitting independently at the EOB eating lunch tray  Transfers  Sit to Stand: Contact guard assistance  Stand to sit: Contact guard assistance  Comment: pt stood w/ w walker w/ CGA x 1 while performing pericare   Ambulation  Ambulation?: Yes  Ambulation 1  Device: Rolling Walker  Other Apparatus: O2(6 L)  Assistance: Contact guard assistance  Quality of Gait: good balance, O2 drops w/ activity;  Pt's O2 sats dropped as low as 75% with minimal exertion while sitting EOB and standing with RW while on 6L nc. Pt tolerates low O2 sats with minimal symptoms, but reports she is normally in the high 80's/low 90's on 5L at home. Pt placed back on Bipap by RN at end of session.    Distance: 5' forward and back 3 X to closet door and back  Comments: nurse Bill Kellogg came to room and switched O2 w/ NC for bi-pap and pt returned supine in bed  Stairs/Curb  Stairs?: No     Balance  Sitting - Static: Good  Sitting - Dynamic: Good  Standing - Static:

## 2019-04-08 NOTE — PROGRESS NOTES
Progress Note    Patient Name:  Mary Ochoa    :  1940  2019 4:10 PM      SUBJECTIVE     No acute events overnight. On high flow nasal cannula. Shortness of breath improving. Denies chest pain. OBJECTIVE     Vital signs:    BP 93/63   Pulse 66   Temp 97.4 °F (36.3 °C) (Oral)   Resp 22   Ht 5' 6\" (1.676 m)   Wt 218 lb 11.1 oz (99.2 kg)   LMP 1985   SpO2 92%   BMI 35.30 kg/m²  50 L/min      Admit Weight:  210 lb (95.3 kg)    Last 3 weights: Wt Readings from Last 3 Encounters:   19 218 lb 11.1 oz (99.2 kg)   19 194 lb (88 kg)   19 189 lb (85.7 kg)       BMI: Body mass index is 35.3 kg/m². Input/Output:       Intake/Output Summary (Last 24 hours) at 2019 1610  Last data filed at 2019 0500  Gross per 24 hour   Intake 1673 ml   Output 500 ml   Net 1173 ml       Date 19 0000 - 19 2359   Shift 8968-5897 9887-6388 0763-8556 24 Hour Total   INTAKE   P.O.(mL/kg/hr) 200(0.3)   200   Shift Total(mL/kg) 200(2)   200(2)   OUTPUT   Urine(mL/kg/hr) 400(0.5)   400   Shift Total(mL/kg) 400(4)   400(4)   Weight (kg) 99.2 99.2 99.2 99.2     Exam:     General appearance: awake and alert moves all ext   Lungs:  Decreased air entry bibasilarly  Heart: S1 and S2 no murmur  Abdomen: positive bowel sounds, no bruits, no masses  Extremities:   Bilateral lower limb edema        Laboratory Studies:     CBC:   Recent Labs     198 19   WBC 5.9 6.5 6.5   HGB 13.2 13.4 13.5   HCT 41.3 41.7 42.2   MCV 92.1 92.8 92.3    153 170     BMP:   Recent Labs     19  0358 19    134* 133*   K 4.2 4.7 4.5   CL 96* 96* 94*   CO2 27 28 31   BUN 25* 27* 26*   CREATININE 1.11* 0.94* 1.03*     PT/INR: No results for input(s): PROTIME, INR in the last 72 hours. APTT: No results for input(s): APTT in the last 72 hours. MAG: No results for input(s): MG in the last 72 hours.   D Dimer: No results for artery disease, CABG twice in 1992 in 2010.     Chronic shortness of breath at rest with idiopathic pulmonary fibrosis, associated to peripheral cyanosis and clubbing of the fingers. PLAN     Continue IV Lasix. Follow creatinine daily. Daily weights. Fluid restriction. Continue current cardiac medication. Overall, guardedprognosis. Palliative care consultation noted. Continue to follow.       Electronically signed by Rachelle Valdes MD on 4/8/2019 at 4:10 PM Patient/Caregiver provided printed discharge information.

## 2019-04-08 NOTE — PROGRESS NOTES
Patient tolerated high flow with a sao2 of 93% for a couple of hours but ask for bipap related to felt harder to breath. Put patient back on bipap until dinner time.

## 2019-04-09 ENCOUNTER — APPOINTMENT (OUTPATIENT)
Dept: GENERAL RADIOLOGY | Age: 79
DRG: 291 | End: 2019-04-09
Payer: MEDICARE

## 2019-04-09 LAB
ABSOLUTE EOS #: 0 K/UL (ref 0–0.4)
ABSOLUTE IMMATURE GRANULOCYTE: ABNORMAL K/UL (ref 0–0.3)
ABSOLUTE LYMPH #: 0.4 K/UL (ref 1–4.8)
ABSOLUTE MONO #: 0.7 K/UL (ref 0.1–1.3)
ANION GAP SERPL CALCULATED.3IONS-SCNC: 10 MMOL/L (ref 9–17)
BASOPHILS # BLD: 1 % (ref 0–2)
BASOPHILS ABSOLUTE: 0 K/UL (ref 0–0.2)
BUN BLDV-MCNC: 25 MG/DL (ref 8–23)
BUN/CREAT BLD: ABNORMAL (ref 9–20)
CALCIUM SERPL-MCNC: 8.7 MG/DL (ref 8.6–10.4)
CHLORIDE BLD-SCNC: 93 MMOL/L (ref 98–107)
CO2: 30 MMOL/L (ref 20–31)
CREAT SERPL-MCNC: 0.93 MG/DL (ref 0.5–0.9)
DIFFERENTIAL TYPE: ABNORMAL
EOSINOPHILS RELATIVE PERCENT: 1 % (ref 0–4)
GFR AFRICAN AMERICAN: >60 ML/MIN
GFR NON-AFRICAN AMERICAN: 58 ML/MIN
GFR SERPL CREATININE-BSD FRML MDRD: ABNORMAL ML/MIN/{1.73_M2}
GFR SERPL CREATININE-BSD FRML MDRD: ABNORMAL ML/MIN/{1.73_M2}
GLUCOSE BLD-MCNC: 118 MG/DL (ref 70–99)
HCT VFR BLD CALC: 41.3 % (ref 36–46)
HEMOGLOBIN: 13.4 G/DL (ref 12–16)
IMMATURE GRANULOCYTES: ABNORMAL %
LYMPHOCYTES # BLD: 5 % (ref 24–44)
MCH RBC QN AUTO: 29.4 PG (ref 26–34)
MCHC RBC AUTO-ENTMCNC: 32.4 G/DL (ref 31–37)
MCV RBC AUTO: 90.5 FL (ref 80–100)
MONOCYTES # BLD: 10 % (ref 1–7)
NRBC AUTOMATED: ABNORMAL PER 100 WBC
PDW BLD-RTO: 20.1 % (ref 11.5–14.9)
PLATELET # BLD: 176 K/UL (ref 150–450)
PLATELET ESTIMATE: ABNORMAL
PMV BLD AUTO: 9.3 FL (ref 6–12)
POTASSIUM SERPL-SCNC: 4.4 MMOL/L (ref 3.7–5.3)
RBC # BLD: 4.57 M/UL (ref 4–5.2)
RBC # BLD: ABNORMAL 10*6/UL
SEG NEUTROPHILS: 83 % (ref 36–66)
SEGMENTED NEUTROPHILS ABSOLUTE COUNT: 5.6 K/UL (ref 1.3–9.1)
SODIUM BLD-SCNC: 133 MMOL/L (ref 135–144)
WBC # BLD: 6.7 K/UL (ref 3.5–11)
WBC # BLD: ABNORMAL 10*3/UL

## 2019-04-09 PROCEDURE — 6370000000 HC RX 637 (ALT 250 FOR IP): Performed by: FAMILY MEDICINE

## 2019-04-09 PROCEDURE — 2700000000 HC OXYGEN THERAPY PER DAY

## 2019-04-09 PROCEDURE — 6360000002 HC RX W HCPCS: Performed by: STUDENT IN AN ORGANIZED HEALTH CARE EDUCATION/TRAINING PROGRAM

## 2019-04-09 PROCEDURE — 2580000003 HC RX 258: Performed by: STUDENT IN AN ORGANIZED HEALTH CARE EDUCATION/TRAINING PROGRAM

## 2019-04-09 PROCEDURE — 80048 BASIC METABOLIC PNL TOTAL CA: CPT

## 2019-04-09 PROCEDURE — 85025 COMPLETE CBC W/AUTO DIFF WBC: CPT

## 2019-04-09 PROCEDURE — 36415 COLL VENOUS BLD VENIPUNCTURE: CPT

## 2019-04-09 PROCEDURE — 94640 AIRWAY INHALATION TREATMENT: CPT

## 2019-04-09 PROCEDURE — 99232 SBSQ HOSP IP/OBS MODERATE 35: CPT | Performed by: FAMILY MEDICINE

## 2019-04-09 PROCEDURE — 6370000000 HC RX 637 (ALT 250 FOR IP): Performed by: INTERNAL MEDICINE

## 2019-04-09 PROCEDURE — 94660 CPAP INITIATION&MGMT: CPT

## 2019-04-09 PROCEDURE — 94762 N-INVAS EAR/PLS OXIMTRY CONT: CPT

## 2019-04-09 PROCEDURE — 6370000000 HC RX 637 (ALT 250 FOR IP): Performed by: STUDENT IN AN ORGANIZED HEALTH CARE EDUCATION/TRAINING PROGRAM

## 2019-04-09 PROCEDURE — 71045 X-RAY EXAM CHEST 1 VIEW: CPT

## 2019-04-09 PROCEDURE — 2060000000 HC ICU INTERMEDIATE R&B

## 2019-04-09 PROCEDURE — 6360000002 HC RX W HCPCS: Performed by: INTERNAL MEDICINE

## 2019-04-09 RX ADMIN — APIXABAN 2.5 MG: 2.5 TABLET, FILM COATED ORAL at 21:31

## 2019-04-09 RX ADMIN — MORPHINE SULFATE 2 MG: 2 INJECTION, SOLUTION INTRAMUSCULAR; INTRAVENOUS at 01:44

## 2019-04-09 RX ADMIN — CIPROFLOXACIN HYDROCHLORIDE 250 MG: 250 TABLET, FILM COATED ORAL at 21:31

## 2019-04-09 RX ADMIN — CITALOPRAM HYDROBROMIDE 20 MG: 20 TABLET ORAL at 09:55

## 2019-04-09 RX ADMIN — APIXABAN 2.5 MG: 2.5 TABLET, FILM COATED ORAL at 09:55

## 2019-04-09 RX ADMIN — POTASSIUM CHLORIDE 20 MEQ: 750 CAPSULE, EXTENDED RELEASE ORAL at 09:54

## 2019-04-09 RX ADMIN — IPRATROPIUM BROMIDE AND ALBUTEROL SULFATE 1 AMPULE: .5; 3 SOLUTION RESPIRATORY (INHALATION) at 21:20

## 2019-04-09 RX ADMIN — IPRATROPIUM BROMIDE AND ALBUTEROL SULFATE 1 AMPULE: .5; 3 SOLUTION RESPIRATORY (INHALATION) at 07:45

## 2019-04-09 RX ADMIN — POTASSIUM CHLORIDE 20 MEQ: 750 CAPSULE, EXTENDED RELEASE ORAL at 21:32

## 2019-04-09 RX ADMIN — PANTOPRAZOLE SODIUM 40 MG: 40 TABLET, DELAYED RELEASE ORAL at 09:54

## 2019-04-09 RX ADMIN — IPRATROPIUM BROMIDE AND ALBUTEROL SULFATE 1 AMPULE: .5; 3 SOLUTION RESPIRATORY (INHALATION) at 15:14

## 2019-04-09 RX ADMIN — ATORVASTATIN CALCIUM 80 MG: 80 TABLET, FILM COATED ORAL at 21:31

## 2019-04-09 RX ADMIN — Medication 1 TABLET: at 09:55

## 2019-04-09 RX ADMIN — Medication 10 ML: at 21:41

## 2019-04-09 RX ADMIN — FUROSEMIDE 20 MG: 10 INJECTION, SOLUTION INTRAMUSCULAR; INTRAVENOUS at 09:54

## 2019-04-09 RX ADMIN — MORPHINE SULFATE 2 MG: 2 INJECTION, SOLUTION INTRAMUSCULAR; INTRAVENOUS at 23:34

## 2019-04-09 RX ADMIN — ASPIRIN 81 MG: 81 TABLET, COATED ORAL at 09:55

## 2019-04-09 RX ADMIN — CIPROFLOXACIN HYDROCHLORIDE 250 MG: 250 TABLET, FILM COATED ORAL at 09:55

## 2019-04-09 RX ADMIN — FUROSEMIDE 20 MG: 10 INJECTION, SOLUTION INTRAMUSCULAR; INTRAVENOUS at 18:36

## 2019-04-09 RX ADMIN — IPRATROPIUM BROMIDE AND ALBUTEROL SULFATE 1 AMPULE: .5; 3 SOLUTION RESPIRATORY (INHALATION) at 10:56

## 2019-04-09 RX ADMIN — METOPROLOL SUCCINATE 100 MG: 100 TABLET, EXTENDED RELEASE ORAL at 09:55

## 2019-04-09 ASSESSMENT — PAIN DESCRIPTION - LOCATION: LOCATION: ABDOMEN

## 2019-04-09 ASSESSMENT — PAIN SCALES - GENERAL
PAINLEVEL_OUTOF10: 0
PAINLEVEL_OUTOF10: 4
PAINLEVEL_OUTOF10: 5

## 2019-04-09 ASSESSMENT — PAIN DESCRIPTION - PAIN TYPE: TYPE: ACUTE PAIN

## 2019-04-09 NOTE — FLOWSHEET NOTE
Patient wanted to eat lunch so it was a brief visit     04/09/19 1230   Encounter Summary   Services provided to: Patient   Referral/Consult From: Rounding   Complexity of Encounter Low   Length of Encounter 15 minutes   Routine   Type Follow up   Assessment Approachable;Calm   Intervention Active listening;Explored feelings, thoughts, concerns   Outcome Expressed gratitude

## 2019-04-09 NOTE — PLAN OF CARE
Problem: Falls - Risk of:  Goal: Will remain free from falls  Description  Will remain free from falls  Outcome: Ongoing  Goal: Absence of physical injury  Description  Absence of physical injury  Outcome: Ongoing     Problem: Risk for Impaired Skin Integrity  Goal: Tissue integrity - skin and mucous membranes  Description  Structural intactness and normal physiological function of skin and  mucous membranes.   Outcome: Ongoing     Problem: Breathing Pattern - Ineffective:  Goal: Ability to achieve and maintain a regular respiratory rate will improve  Description  Ability to achieve and maintain a regular respiratory rate will improve  Outcome: Ongoing     Problem: Musculor/Skeletal Functional Status  Goal: Highest potential functional level  Outcome: Ongoing  Goal: Absence of falls  Outcome: Ongoing

## 2019-04-09 NOTE — PROGRESS NOTES
Pulmonary Progress Note  [facility]      Patient - Aminta Emanuel,  Age - 78 y.o.    - 1940      Room Number -    MR[facility] -  [id number]   [facility]  Date of Admission -  2019  4:16 PM    Consulting 838 Loly Sanchez MD  Primary Care Physician - Amber Bond MD     SUBJECTIVE   Patient currently is on high flow. She does not look like she is in respiratory distress at rest at this time. OBJECTIVE   VITALS    height is 5' 6\" (1.676 m) and weight is 218 lb 11.1 oz (99.2 kg). Her axillary temperature is 97.9 °F (36.6 °C). Her blood pressure is 95/60 and her pulse is 72. Her respiration is 15 and oxygen saturation is 91%. Body mass index is 35.3 kg/m². Temperature Range: Temp: 97.9 °F (36.6 °C) Temp  Av.1 °F (36.7 °C)  Min: 97.9 °F (36.6 °C)  Max: 98.5 °F (36.9 °C)  BP Range:  Systolic (10LQQ), YOT:108 , Min:87 , PUC:494     Diastolic (14USN), SF, Min:42, Max:92    Pulse Range: Pulse  Av.9  Min: 64  Max: 77  Respiration Range: Resp  Av.6  Min: 15  Max: 28  Current Pulse Ox[de-identified]  SpO2: 91 %  24HR Pulse Ox Range:  SpO2  Av.4 %  Min: 88 %  Max: 98 %  Oxygen Amount and Delivery: O2 Flow Rate (L/min): 50 L/min    Wt Readings from Last 3 Encounters:   19 218 lb 11.1 oz (99.2 kg)   19 194 lb (88 kg)   19 189 lb (85.7 kg)       I/O (24 Hours)    Intake/Output Summary (Last 24 hours) at 2019 1218  Last data filed at 2019 1645  Gross per 24 hour   Intake 500 ml   Output 900 ml   Net -400 ml       EXAM     General Appearance  Awake, alert, oriented, in no acute distress  HEENT - normocephalic, atraumatic. Neck - Supple,  trachea midline   Lungs - crackles heard on forced from base to apex posteriorly. No obvious wheezes rhonchi. Heart Exam:PMI normal.  Regular rhythm no obvious murmur  Abdomen Exam: Abdomen soft, non-tender.    Extremity Exam: No gross pretibial edema   MEDS      Interstitial lung disease ([facility]    Centrilobular emphysema (HCC)    Pulmonary hypertension (HCC)    Chronic diastolic CHF (congestive heart failure) (HCC)    Pulmonary embolus (HCC)    CAD (coronary artery disease), native coronary artery    Hypertensive heart disease without heart failure    Hypotension    Hypotension, unspecified    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    Iatrogenic hypotension    Lung nodule, solitary    S/P CABG x 4    Acute on chronic respiratory failure with hypoxemia (HCC)    Hypoxia    Moderate malnutrition (HCC)    CHF, left ventricular (HCC)    Acute on chronic diastolic CHF (congestive heart failure) (HCC)     Chest x-ray reveals evidence of volume overload on top fibrotic type changes. Patient's severe hypoxemia is multifactorial including fibrosis. This is superimposed on volume overload. The patient has a diagnosis of congestive heart failure. Ejection fraction on 2018 echo revealed at 35-40%. RVSP 70 mmHg. Patient has evidence of fibrosis on CT scan. Will continue with Lasix. Follow creatinine closely. Currently the creatinine is normal.  On Eliquis for chronic atrial fibrillation  On DuoNeb treatments. Can start weaning the FiO2    Follow closely.     Electronically signed by Marcus Perez MD on 4/9/2019 at 12:18 PM

## 2019-04-09 NOTE — PROGRESS NOTES
FAMILY MEDICINE  - PROGRESS NOTE    Date:  4/9/2019  Oneyda Barrera  [id number]      Chief Complaint   Patient presents with    Shortness of Breath         Interval History:  Improved, she is feeling better and wants to go home but she is still requiring BiPAP at times. She has no new complaints.       Subjective  Respiratory: positive for emphysema, shortness of breath and wheezing  Cardiovascular: positive for irregular heart beat and lower extremity edema  Musculoskeletal:positive for arthralgias, myalgias and stiff joints  Behavioral/Psych: positive for anxiety and obesity:    Objective:    /72   Pulse 74   Temp 97.9 °F (36.6 °C) (Axillary)   Resp 25   Ht 5' 6\" (1.676 m)   Wt 218 lb 11.1 oz (99.2 kg)   LMP 11/01/1985   SpO2 91%   BMI 35.30 kg/m²   General appearance - alert, well appearing, and in no distress and overweight  Mental status - alert, oriented to person, place, and time  Eyes - pupils equal and reactive, extraocular eye movements intact  Ears - hearing grossly normal bilaterally  Nose - normal and patent, no erythema, discharge or polyps  Mouth - mucous membranes moist, pharynx normal without lesions  Neck - supple, no significant adenopathy  Lymphatics - no palpable lymphadenopathy, no hepatosplenomegaly  Chest - wheezing noted posteriorly, decreased air entry noted posteriorly  Heart - irregularly irregular rhythm with rate 74  Abdomen - soft, nontender, nondistended, no masses or organomegaly  Breasts - not examined  Back exam - not examined  Neurological - alert, oriented, normal speech, no focal findings or movement disorder noted  Musculoskeletal - osteoarthritic changes noted in both hands  Extremities - pedal edema trace +  Skin - normal coloration and turgor, no rashes, no suspicious skin lesions noted    Data:   Medications:   Current Facility-Administered Medications   Medication Dose Route Frequency Provider Last Rate Last Dose    ipratropium-albuterol (DUONEB) nebulizer solution 1 ampule  1 ampule Inhalation Q4H While awake Filippo Tran MD   1 ampule at 04/08/19 1943    potassium chloride (MICRO-K) extended release capsule 20 mEq  20 mEq Oral BID Shy Phipps MD   20 mEq at 04/08/19 1939    ciprofloxacin (CIPRO) tablet 250 mg  250 mg Oral 2 times per day Gay Perez MD   250 mg at 04/08/19 1935    furosemide (LASIX) injection 20 mg  20 mg Intravenous BID Shy Phipps MD   20 mg at 04/08/19 1820    apixaban (ELIQUIS) tablet 2.5 mg  2.5 mg Oral BID Tiffany Crawford MD   2.5 mg at 04/08/19 1934    aspirin EC tablet 81 mg  81 mg Oral Daily Tiffany Crawford MD   81 mg at 04/08/19 3360    atorvastatin (LIPITOR) tablet 80 mg  80 mg Oral Daily Tiffany Crawford MD   80 mg at 04/08/19 1934    calcium carbonate-vitamin D (CALTRATE) 600-400 MG-UNIT per tab 1 tablet  1 tablet Oral Daily Tiffany Crawford MD   1 tablet at 04/08/19 6988    citalopram (CELEXA) tablet 20 mg  20 mg Oral Daily Tiffany Crawford MD   20 mg at 04/08/19 0920    LORazepam (ATIVAN) tablet 0.5 mg  0.5 mg Oral Q8H PRN Tiffany Crawford MD   0.5 mg at 04/08/19 2346    metoprolol succinate (TOPROL XL) extended release tablet 100 mg  100 mg Oral Daily Tiffany Crawford MD   100 mg at 04/08/19 0920    pantoprazole (PROTONIX) tablet 40 mg  40 mg Oral QAM AC Tiffany Crawford MD   40 mg at 04/08/19 0920    sodium chloride flush 0.9 % injection 10 mL  10 mL Intravenous 2 times per day Tiffany Crawford MD   10 mL at 04/08/19 1934    sodium chloride flush 0.9 % injection 10 mL  10 mL Intravenous PRN Tiffany Crawford MD   10 mL at 04/04/19 2048    acetaminophen (TYLENOL) tablet 650 mg  650 mg Oral Q4H PRN Tiffany Crawford MD        morphine (PF) injection 2 mg  2 mg Intravenous Q2H PRN Tiffany Crawford MD   2 mg at 04/09/19 0144    Or    morphine sulfate (PF) injection 4 mg  4 mg Intravenous Q2H PRN Tiffany Crawford MD        ondansetron [facility] GERD (gastroesophageal reflux disease)     Anxiety     Lipids abnormal     Hypertension     Heart disease     Atrial fibrillation (HCC)     Pulmonary fibrosis (HCC)     Pneumonia     Mixed hyperlipidemia     Acute cystitis without hematuria     Interstitial lung disease (HCC)     Centrilobular emphysema (HCC)     Pulmonary hypertension (HCC)     Chronic diastolic CHF (congestive heart failure) (HCC)     Pulmonary embolus (HCC)     CAD (coronary artery disease), native coronary artery     Hypertensive heart disease without heart failure     Hypotension     Hypotension, unspecified     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 COPD & Pulmonary fibrosis - on BiPAP. Continue current treatments. D/C planning ongoing. Complete orders per chart.     See orders   Disposition:    Electronically signed by Umesh Shane MD on 4/9/2019 at 5:55 AM

## 2019-04-09 NOTE — PROGRESS NOTES
Progress Note    Patient Name:  Yael Lester    :  1940  2019 10:55 AM      SUBJECTIVE     No acute events overnight. Shortness of breath and lower limb edema continues to improve. OBJECTIVE     Vital signs:    BP 95/60   Pulse 72   Temp 97.9 °F (36.6 °C) (Axillary)   Resp 19   Ht 5' 6\" (1.676 m)   Wt 218 lb 11.1 oz (99.2 kg)   LMP 1985   SpO2 98%   BMI 35.30 kg/m²  50 L/min      Admit Weight:  210 lb (95.3 kg)    Last 3 weights: Wt Readings from Last 3 Encounters:   19 218 lb 11.1 oz (99.2 kg)   19 194 lb (88 kg)   19 189 lb (85.7 kg)       BMI: Body mass index is 35.3 kg/m². Input/Output:       Intake/Output Summary (Last 24 hours) at 2019 1055  Last data filed at 2019 1645  Gross per 24 hour   Intake 500 ml   Output 900 ml   Net -400 ml         Exam:     General appearance: awake and alert moves all ext   Lungs:  Decreased air entry bibasilarly  Heart: S1 and S2 no murmur  Abdomen: positive bowel sounds, no bruits, no masses  Extremities:   Bilateral lower limb edema        Laboratory Studies:     CBC:   Recent Labs     196 19  0421 19  0357   WBC 6.5 6.5 6.7   HGB 13.4 13.5 13.4   HCT 41.7 42.2 41.3   MCV 92.8 92.3 90.5    170 176     BMP:   Recent Labs     19  0416 19  0421 19  0357   * 133* 133*   K 4.7 4.5 4.4   CL 96* 94* 93*   CO2 28 31 30   BUN 27* 26* 25*   CREATININE 0.94* 1.03* 0.93*     PT/INR: No results for input(s): PROTIME, INR in the last 72 hours. APTT: No results for input(s): APTT in the last 72 hours. MAG: No results for input(s): MG in the last 72 hours. D Dimer: No results for input(s): DDIMER in the last 72 hours. Troponin  No results for input(s): TROPONINI in the last 72 hours. No results for input(s): TROPONINT in the last 72 hours. BNP No results for input(s): BNP in the last 72 hours. No results for input(s): PROBNP in the last 72 hours.       Pulse Ox: SpO2  Av.7 %  Min: 88 %  Max: 98 %  Supplemental O2: O2 Flow Rate (L/min): 50 L/min     Current Meds:    ipratropium-albuterol  1 ampule Inhalation Q4H While awake    potassium chloride  20 mEq Oral BID    ciprofloxacin  250 mg Oral 2 times per day    furosemide  20 mg Intravenous BID    apixaban  2.5 mg Oral BID    aspirin  81 mg Oral Daily    atorvastatin  80 mg Oral Daily    calcium carbonate-vitamin D  1 tablet Oral Daily    citalopram  20 mg Oral Daily    metoprolol succinate  100 mg Oral Daily    pantoprazole  40 mg Oral QAM AC    sodium chloride flush  10 mL Intravenous 2 times per day     Continuous Infusions:          ASSESSMENT     Principal Problem:    Acute on chronic diastolic CHF (congestive heart failure) (HCC)  Active Problems:    GERD (gastroesophageal reflux disease)    Anxiety    Hypertension    Atrial fibrillation (HCC)    Pulmonary fibrosis (HCC)    Mixed hyperlipidemia    Acute cystitis without hematuria    Interstitial lung disease (HCC)    Centrilobular emphysema (HCC)    Pulmonary hypertension (HCC)    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 *    Acute on chronic Right and left heart failure with chronic cor pulmonale. LVEF 45-50%.     Severe tricuspid regurgitation, severe pulmonary hypertension with RVSP 70-75 mm Hg, severely dilated right 23, moderate to severe RV systolic dysfunction.     Chronic atrial fibrillation. On chronic oral anticoagulation using Eliquis 2.5 mg twice a day.     Atherosclerotic coronary artery disease, CABG twice in  in .     Chronic shortness of breath at rest with idiopathic pulmonary fibrosis, associated to peripheral cyanosis and clubbing of the fingers. PLAN     Creatinine remains stable. Continue IV Lasix. Follow creatinine daily. Daily weights.   Fluid restriction. Continue current cardiac medication. Overall, guardedprognosis. Palliative care consultation noted. Continue to follow.       Electronically signed by Claudine Martinez MD on 4/9/2019 at 10:55 AM

## 2019-04-09 NOTE — PLAN OF CARE
Problem: Falls - Risk of:  Goal: Will remain free from falls  Description  Will remain free from falls  4/9/2019 0307 by Christy Aguilera RN  Outcome: Met This Shift  Note:   Pt remains free from falls this shift  Bed locked in lowest position  Call light in reach; pt uses appropriately  Fall risk assessment completed and applied   4/8/2019 1811 by Jodee Ayala RN  Outcome: Met This Shift  Goal: Absence of physical injury  Description  Absence of physical injury  4/9/2019 0307 by Christy Aguilera RN  Outcome: Met This Shift  4/8/2019 1811 by Jodee Ayala RN  Outcome: Met This Shift     Problem: Risk for Impaired Skin Integrity  Goal: Tissue integrity - skin and mucous membranes  Description  Structural intactness and normal physiological function of skin and  mucous membranes. 4/9/2019 0307 by Christy Aguilera RN  Note:   Skin integrity checked and maintained  No new breakdown noted  Skin kept clean and dry  Moisture barrier and lotion applied to skin  Pt turns per self.  Helped positioned prn  Aj scale completed and applied   4/8/2019 1811 by Jodee Ayala RN  Outcome: Ongoing     Problem: Breathing Pattern - Ineffective:  Goal: Ability to achieve and maintain a regular respiratory rate will improve  Description  Ability to achieve and maintain a regular respiratory rate will improve  4/9/2019 0307 by Christy Aguilera RN  Outcome: Ongoing  Note:   Pt on NC, High flow, and bipap throughout shift  4/8/2019 1811 by Jodee Ayala RN  Outcome: Ongoing     Problem: Musculor/Skeletal Functional Status  Goal: Highest potential functional level  4/8/2019 1811 by Jodee Ayala RN  Outcome: Ongoing

## 2019-04-09 NOTE — FLOWSHEET NOTE
04/09/19 1213   Encounter Summary   Services provided to: Patient   Referral/Consult From: Todd   Continue Visiting   (4/9/19V)   Volunteer Visit Yes   Complexity of Encounter Low   Length of Encounter 15 minutes   Routine   Type Follow up   Spiritual/Advent   Type Spiritual support   Intervention [address] Patient received communion

## 2019-04-10 LAB
ABSOLUTE EOS #: 0.1 K/UL (ref 0–0.4)
ABSOLUTE IMMATURE GRANULOCYTE: ABNORMAL K/UL (ref 0–0.3)
ABSOLUTE LYMPH #: 0.5 K/UL (ref 1–4.8)
ABSOLUTE MONO #: 0.7 K/UL (ref 0.1–1.3)
ANION GAP SERPL CALCULATED.3IONS-SCNC: 8 MMOL/L (ref 9–17)
BASOPHILS # BLD: 1 % (ref 0–2)
BASOPHILS ABSOLUTE: 0 K/UL (ref 0–0.2)
BUN BLDV-MCNC: 28 MG/DL (ref 8–23)
BUN/CREAT BLD: ABNORMAL (ref 9–20)
CALCIUM SERPL-MCNC: 8.9 MG/DL (ref 8.6–10.4)
CHLORIDE BLD-SCNC: 95 MMOL/L (ref 98–107)
CO2: 32 MMOL/L (ref 20–31)
CREAT SERPL-MCNC: 1.06 MG/DL (ref 0.5–0.9)
DIFFERENTIAL TYPE: ABNORMAL
EOSINOPHILS RELATIVE PERCENT: 2 % (ref 0–4)
GFR AFRICAN AMERICAN: >60 ML/MIN
GFR NON-AFRICAN AMERICAN: 50 ML/MIN
GFR SERPL CREATININE-BSD FRML MDRD: ABNORMAL ML/MIN/{1.73_M2}
GFR SERPL CREATININE-BSD FRML MDRD: ABNORMAL ML/MIN/{1.73_M2}
GLUCOSE BLD-MCNC: 105 MG/DL (ref 70–99)
HCT VFR BLD CALC: 40.4 % (ref 36–46)
HEMOGLOBIN: 13.4 G/DL (ref 12–16)
IMMATURE GRANULOCYTES: ABNORMAL %
LYMPHOCYTES # BLD: 9 % (ref 24–44)
MCH RBC QN AUTO: 30.2 PG (ref 26–34)
MCHC RBC AUTO-ENTMCNC: 33.1 G/DL (ref 31–37)
MCV RBC AUTO: 91.3 FL (ref 80–100)
MONOCYTES # BLD: 11 % (ref 1–7)
NRBC AUTOMATED: ABNORMAL PER 100 WBC
PDW BLD-RTO: 20.5 % (ref 11.5–14.9)
PLATELET # BLD: 184 K/UL (ref 150–450)
PLATELET ESTIMATE: ABNORMAL
PMV BLD AUTO: 9.2 FL (ref 6–12)
POTASSIUM SERPL-SCNC: 4.5 MMOL/L (ref 3.7–5.3)
RBC # BLD: 4.42 M/UL (ref 4–5.2)
RBC # BLD: ABNORMAL 10*6/UL
SEG NEUTROPHILS: 77 % (ref 36–66)
SEGMENTED NEUTROPHILS ABSOLUTE COUNT: 4.6 K/UL (ref 1.3–9.1)
SODIUM BLD-SCNC: 135 MMOL/L (ref 135–144)
WBC # BLD: 5.9 K/UL (ref 3.5–11)
WBC # BLD: ABNORMAL 10*3/UL

## 2019-04-10 PROCEDURE — 6370000000 HC RX 637 (ALT 250 FOR IP): Performed by: INTERNAL MEDICINE

## 2019-04-10 PROCEDURE — 94660 CPAP INITIATION&MGMT: CPT

## 2019-04-10 PROCEDURE — 6370000000 HC RX 637 (ALT 250 FOR IP): Performed by: STUDENT IN AN ORGANIZED HEALTH CARE EDUCATION/TRAINING PROGRAM

## 2019-04-10 PROCEDURE — 99232 SBSQ HOSP IP/OBS MODERATE 35: CPT | Performed by: FAMILY MEDICINE

## 2019-04-10 PROCEDURE — 36415 COLL VENOUS BLD VENIPUNCTURE: CPT

## 2019-04-10 PROCEDURE — 2580000003 HC RX 258: Performed by: STUDENT IN AN ORGANIZED HEALTH CARE EDUCATION/TRAINING PROGRAM

## 2019-04-10 PROCEDURE — 6360000002 HC RX W HCPCS: Performed by: STUDENT IN AN ORGANIZED HEALTH CARE EDUCATION/TRAINING PROGRAM

## 2019-04-10 PROCEDURE — 2060000000 HC ICU INTERMEDIATE R&B

## 2019-04-10 PROCEDURE — 94640 AIRWAY INHALATION TREATMENT: CPT

## 2019-04-10 PROCEDURE — 94762 N-INVAS EAR/PLS OXIMTRY CONT: CPT

## 2019-04-10 PROCEDURE — 2700000000 HC OXYGEN THERAPY PER DAY

## 2019-04-10 PROCEDURE — 85025 COMPLETE CBC W/AUTO DIFF WBC: CPT

## 2019-04-10 PROCEDURE — 6360000002 HC RX W HCPCS: Performed by: INTERNAL MEDICINE

## 2019-04-10 PROCEDURE — 80048 BASIC METABOLIC PNL TOTAL CA: CPT

## 2019-04-10 PROCEDURE — 6370000000 HC RX 637 (ALT 250 FOR IP): Performed by: FAMILY MEDICINE

## 2019-04-10 RX ORDER — POTASSIUM CHLORIDE 20 MEQ/1
20 TABLET, EXTENDED RELEASE ORAL 2 TIMES DAILY WITH MEALS
Status: DISCONTINUED | OUTPATIENT
Start: 2019-04-10 | End: 2019-04-19 | Stop reason: HOSPADM

## 2019-04-10 RX ADMIN — MORPHINE SULFATE 2 MG: 2 INJECTION, SOLUTION INTRAMUSCULAR; INTRAVENOUS at 23:39

## 2019-04-10 RX ADMIN — APIXABAN 2.5 MG: 2.5 TABLET, FILM COATED ORAL at 20:44

## 2019-04-10 RX ADMIN — POTASSIUM CHLORIDE 20 MEQ: 1500 TABLET, EXTENDED RELEASE ORAL at 09:03

## 2019-04-10 RX ADMIN — Medication 1 TABLET: at 09:05

## 2019-04-10 RX ADMIN — LORAZEPAM 0.5 MG: 0.5 TABLET ORAL at 21:55

## 2019-04-10 RX ADMIN — Medication 10 ML: at 23:00

## 2019-04-10 RX ADMIN — FUROSEMIDE 20 MG: 10 INJECTION, SOLUTION INTRAMUSCULAR; INTRAVENOUS at 09:03

## 2019-04-10 RX ADMIN — FUROSEMIDE 20 MG: 10 INJECTION, SOLUTION INTRAMUSCULAR; INTRAVENOUS at 16:39

## 2019-04-10 RX ADMIN — CIPROFLOXACIN HYDROCHLORIDE 250 MG: 250 TABLET, FILM COATED ORAL at 20:44

## 2019-04-10 RX ADMIN — IPRATROPIUM BROMIDE AND ALBUTEROL SULFATE 1 AMPULE: .5; 3 SOLUTION RESPIRATORY (INHALATION) at 08:31

## 2019-04-10 RX ADMIN — PANTOPRAZOLE SODIUM 40 MG: 40 TABLET, DELAYED RELEASE ORAL at 05:53

## 2019-04-10 RX ADMIN — METOPROLOL SUCCINATE 100 MG: 100 TABLET, EXTENDED RELEASE ORAL at 09:03

## 2019-04-10 RX ADMIN — IPRATROPIUM BROMIDE AND ALBUTEROL SULFATE 1 AMPULE: .5; 3 SOLUTION RESPIRATORY (INHALATION) at 11:42

## 2019-04-10 RX ADMIN — APIXABAN 2.5 MG: 2.5 TABLET, FILM COATED ORAL at 09:03

## 2019-04-10 RX ADMIN — ATORVASTATIN CALCIUM 80 MG: 80 TABLET, FILM COATED ORAL at 20:44

## 2019-04-10 RX ADMIN — CIPROFLOXACIN HYDROCHLORIDE 250 MG: 250 TABLET, FILM COATED ORAL at 09:03

## 2019-04-10 RX ADMIN — ASPIRIN 81 MG: 81 TABLET, COATED ORAL at 09:03

## 2019-04-10 RX ADMIN — IPRATROPIUM BROMIDE AND ALBUTEROL SULFATE 1 AMPULE: .5; 3 SOLUTION RESPIRATORY (INHALATION) at 16:01

## 2019-04-10 RX ADMIN — CITALOPRAM HYDROBROMIDE 20 MG: 20 TABLET ORAL at 09:03

## 2019-04-10 RX ADMIN — POTASSIUM CHLORIDE 20 MEQ: 1500 TABLET, EXTENDED RELEASE ORAL at 16:39

## 2019-04-10 RX ADMIN — Medication 10 ML: at 09:30

## 2019-04-10 RX ADMIN — IPRATROPIUM BROMIDE AND ALBUTEROL SULFATE 1 AMPULE: .5; 3 SOLUTION RESPIRATORY (INHALATION) at 19:41

## 2019-04-10 ASSESSMENT — PAIN SCALES - GENERAL
PAINLEVEL_OUTOF10: 0
PAINLEVEL_OUTOF10: 0
PAINLEVEL_OUTOF10: 7
PAINLEVEL_OUTOF10: 0

## 2019-04-10 NOTE — PROGRESS NOTES
FAMILY MEDICINE  - PROGRESS NOTE    Date:  4/10/2019  Asif Cisneros  [id number]      Chief Complaint   Patient presents with    Shortness of Breath         Interval History:  Improved, she is feeling better but she is still on high flow O2 and BiPAP at night.       Subjective  Respiratory: positive for emphysema and shortness of breath  Cardiovascular: positive for irregular heart beat and lower extremity edema  Musculoskeletal:positive for arthralgias and myalgias  Behavioral/Psych: positive for anxiety and obesity:    Objective:    /69   Pulse 77   Temp 97.6 °F (36.4 °C) (Oral)   Resp 20   Ht 5' 6\" (1.676 m)   Wt 218 lb 11.1 oz (99.2 kg)   LMP 11/01/1985   SpO2 90%   BMI 35.30 kg/m²   General appearance - alert, well appearing, and in no distress and overweight  Mental status - alert, oriented to person, place, and time  Eyes - pupils equal and reactive, extraocular eye movements intact  Ears - hearing grossly normal bilaterally  Nose - normal and patent, no erythema, discharge or polyps  Mouth - mucous membranes moist, pharynx normal without lesions  Neck - supple, no significant adenopathy  Lymphatics - no palpable lymphadenopathy, no hepatosplenomegaly  Chest - decreased air entry noted posteriorly  Heart - irregularly irregular rhythm with rate 77  Abdomen - soft, nontender, nondistended, no masses or organomegaly  Breasts - not examined  Back exam - not examined  Neurological - alert, oriented, normal speech, no focal findings or movement disorder noted  Musculoskeletal - osteoarthritic changes noted in both hands  Extremities - pedal edema trace +  Skin - normal coloration and turgor, no rashes, no suspicious skin lesions noted    Data:   Medications:   Current Facility-Administered Medications   Medication Dose Route Frequency Provider Last Rate Last Dose    potassium chloride (KLOR-CON M) extended release tablet 20 mEq  20 mEq Oral BID WC Kezia Lui MD        ipratropium-albuterol (DUONEB) nebulizer solution 1 ampule  1 ampule Inhalation Q4H While awake Mary Heart MD   1 ampule at 04/09/19 2120    ciprofloxacin (CIPRO) tablet 250 mg  250 mg Oral 2 times per day Omer Lima MD   250 mg at 04/09/19 2131    furosemide (LASIX) injection 20 mg  20 mg Intravenous BID Karson Phipps MD   20 mg at 04/09/19 1836    apixaban (ELIQUIS) tablet 2.5 mg  2.5 mg Oral BID Rakesh Purdy MD   2.5 mg at 04/09/19 2131    aspirin EC tablet 81 mg  81 mg Oral Daily Rakesh Purdy MD   81 mg at 04/09/19 0955    atorvastatin (LIPITOR) tablet 80 mg  80 mg Oral Daily Rakesh Purdy MD   80 mg at 04/09/19 2131    calcium carbonate-vitamin D (CALTRATE) 600-400 MG-UNIT per tab 1 tablet  1 tablet Oral Daily Rakesh Purdy MD   1 tablet at 04/09/19 0955    citalopram (CELEXA) tablet 20 mg  20 mg Oral Daily Rakesh Purdy MD   20 mg at 04/09/19 0955    LORazepam (ATIVAN) tablet 0.5 mg  0.5 mg Oral Q8H PRN aRkesh Purdy MD   0.5 mg at 04/08/19 2346    metoprolol succinate (TOPROL XL) extended release tablet 100 mg  100 mg Oral Daily Rakesh Purdy MD   100 mg at 04/09/19 0955    pantoprazole (PROTONIX) tablet 40 mg  40 mg Oral QAM AC Rakesh Purdy MD   40 mg at 04/09/19 0954    sodium chloride flush 0.9 % injection 10 mL  10 mL Intravenous 2 times per day Rakesh Purdy MD   10 mL at 04/09/19 2141    sodium chloride flush 0.9 % injection 10 mL  10 mL Intravenous PRN Rakesh Purdy MD   10 mL at 04/04/19 2048    acetaminophen (TYLENOL) tablet 650 mg  650 mg Oral Q4H PRN Rakesh Purdy MD        morphine (PF) injection 2 mg  2 mg Intravenous Q2H PRN Rakesh Purdy MD   2 mg at 04/09/19 2334    Or    morphine sulfate (PF) injection 4 mg  4 mg Intravenous Q2H PRN Rakesh Purdy MD        ondansetron [facility]) injection 4 mg  4 mg Intravenous Q8H PRN Rakesh Purdy MD           Intake/Output Summary (Last 24 hours) at 4/10/2019 0523  Last data filed at 4/9/2019 1900  Gross per 24 hour   Intake --   Output 1300 ml   Net -1300 ml     Recent Results (from the past 24 hour(s))   Basic Metabolic Prof    Collection Time: 04/10/19  4:33 AM   Result Value Ref Range    Glucose 105 (H) 70 - 99 mg/dL    BUN 28 (H) 8 - 23 mg/dL    CREATININE 1.06 (H) 0.50 - 0.90 mg/dL    Bun/Cre Ratio NOT REPORTED 9 - 20    Calcium 8.9 8.6 - 10.4 mg/dL    Sodium 135 135 - 144 mmol/L    Potassium 4.5 3.7 - 5.3 mmol/L    Chloride 95 (L) 98 - 107 mmol/L    CO2 32 (H) 20 - 31 mmol/L    Anion Gap 8 (L) 9 - 17 mmol/L    GFR Non-African American 50 (L) >60 mL/min    GFR African American >60 >60 mL/min    GFR Comment          GFR Staging NOT REPORTED    CBC with DIFF    Collection Time: 04/10/19  4:33 AM   Result Value Ref Range    WBC 5.9 3.5 - 11.0 k/uL    RBC 4.42 4.0 - 5.2 m/uL    Hemoglobin 13.4 12.0 - 16.0 g/dL    Hematocrit 40.4 36 - 46 %    MCV 91.3 80 - 100 fL    MCH 30.2 26 - 34 pg    MCHC 33.1 31 - 37 g/dL    RDW 20.5 (H) 11.5 - 14.9 %    Platelets 932 245 - 318 k/uL    MPV 9.2 6.0 - 12.0 fL    NRBC Automated NOT REPORTED per 100 WBC    Differential Type NOT REPORTED     Seg Neutrophils 77 (H) 36 - 66 %    Lymphocytes 9 (L) 24 - 44 %    Monocytes 11 (H) 1 - 7 %    Eosinophils % 2 0 - 4 %    Basophils 1 0 - 2 %    Immature Granulocytes NOT REPORTED 0 %    Segs Absolute 4.60 1.3 - 9.1 k/uL    Absolute Lymph # 0.50 (L) 1.0 - 4.8 k/uL    Absolute Mono # 0.70 0.1 - 1.3 k/uL    Absolute Eos # 0.10 0.0 - 0.4 k/uL    Basophils # 0.00 0.0 - 0.2 k/uL    Absolute Immature Granulocyte NOT REPORTED 0.00 - 0.30 k/uL    WBC Morphology NOT REPORTED     RBC Morphology NOT REPORTED     Platelet Estimate NOT REPORTED      -----------------------------------------------------------------  RAD:  EKG:  Micro:     Assessment & Plan:    Patient Active Problem List:     GERD (gastroesophageal reflux disease)     Anxiety     Lipids abnormal     Hypertension     Heart disease Atrial fibrillation (HCC)     Pulmonary fibrosis (HCC)     Pneumonia     Mixed hyperlipidemia     Acute cystitis without hematuria     Interstitial lung disease (HCC)     Centrilobular emphysema (HCC)     Pulmonary hypertension (HCC)     Chronic diastolic CHF (congestive heart failure) (HCC)     Pulmonary embolus (HCC)     CAD (coronary artery disease), native coronary artery     Hypertensive heart disease without heart failure     Hypotension     Hypotension, unspecified     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 Acute on Chronic CHF - improved. Continue current treatments. D/C planning ongoing. Complete orders per chart.     See orders   Disposition:    Electronically signed by Holden Mujica MD on 4/10/2019 at 5:23 AM

## 2019-04-10 NOTE — PROGRESS NOTES
Pulmonary Progress Note  [facility]      Patient - Noble Alaniz,  Age - 78 y.o.    - 1940      Room Number -    MRN -  [id number]   Acct # - [de-identified]  Date of Admission -  2019  4:16 PM    Consulting Orion Aguirre MD  Primary Care Physician - Karely Leyva MD     SUBJECTIVE   Currently on 50% and high flow--- sats 91-92 %. OBJECTIVE   VITALS    height is 5' 6\" (1.676 m) and weight is 223 lb 12.3 oz (101.5 kg). Her temperature is 98.5 °F (36.9 °C). Her blood pressure is 97/55 (abnormal) and her pulse is 74. Her respiration is 21 and oxygen saturation is 91%. Body mass index is 36.12 kg/m². Temperature Range: Temp: 98.5 °F (36.9 °C) Temp  Av.1 °F (36.7 °C)  Min: 97.6 °F (36.4 °C)  Max: 98.5 °F (36.9 °C)  BP Range:  Systolic (46OMX), SBH:624 , Min:91 , halfway:747     Diastolic (21MID), halfway:92, Min:49, Max:69    Pulse Range: Pulse  Av.9  Min: 71  Max: 77  Respiration Range: Resp  Av.9  Min: 16  Max: 29  Current Pulse Ox[de-identified]  SpO2: 91 %  24HR Pulse Ox Range:  SpO2  Av.9 %  Min: 86 %  Max: 93 %  Oxygen Amount and Delivery: O2 Flow Rate (L/min): 50 L/min    Wt Readings from Last 3 Encounters:   04/10/19 223 lb 12.3 oz (101.5 kg)   19 194 lb (88 kg)   19 189 lb (85.7 kg)       I/O (24 Hours)    Intake/Output Summary (Last 24 hours) at 4/10/2019 1910  Last data filed at 4/10/2019 1700  Gross per 24 hour   Intake 940 ml   Output 1400 ml   Net -460 ml       EXAM     General Appearance  Awake, alert, oriented, in no acute distress  HEENT - normocephalic, atraumatic. Neck - Supple,  trachea midline   Lungs - dry crackles at bases 1/3 from base to apex posteriorly  Heart Exam:PMI normal. No lifts, heaves, or thrills. RRR. No murmurs, clicks, gallops, or rubs  Abdomen Exam: Abdomen soft, non-tender.  BS normal. No masses,  Extremity Exam: no cyanosis    MEDS      potassium chloride  20 mEq Oral BID WC  ipratropium-albuterol  1 ampule Inhalation Q4H While awake    ciprofloxacin  250 mg Oral 2 times per day    furosemide  20 mg Intravenous BID    apixaban  2.5 mg Oral BID    aspirin  81 mg Oral Daily    atorvastatin  80 mg Oral Daily    calcium carbonate-vitamin D  1 tablet Oral Daily    citalopram  20 mg Oral Daily    metoprolol succinate  100 mg Oral Daily    pantoprazole  40 mg Oral QAM AC    sodium chloride flush  10 mL Intravenous 2 times per day       LORazepam, sodium chloride flush, acetaminophen, morphine **OR** morphine, ondansetron    LABS   CBC   Recent Labs     04/10/19  0433   WBC 5.9   HGB 13.4   HCT 40.4   MCV 91.3        BMP:   Lab Results   Component Value Date     04/10/2019    K 4.5 04/10/2019    CL 95 04/10/2019    CO2 32 04/10/2019    BUN 28 04/10/2019    LABALBU 3.2 01/30/2019    CREATININE 1.06 04/10/2019    CALCIUM 8.9 04/10/2019    GFRAA >60 04/10/2019    LABGLOM 50 04/10/2019     ABGs:  Lab Results   Component Value Date    PHART 7.404 01/24/2019    PO2ART 64.1 01/24/2019    YYY5ENM 46.1 01/24/2019      Lab Results   Component Value Date    MODE NOT REPORTED 01/24/2019     Ionized Calcium:  No results found for: IONCA  Magnesium:    Lab Results   Component Value Date    MG 1.8 12/27/2017     Phosphorus:  No results found for: PHOS     LIVER PROFILE No results for input(s): AST, ALT, LIPASE, BILIDIR, BILITOT, ALKPHOS in the last 72 hours. Invalid input(s): AMYLASE,  ALB  INR No results for input(s): INR in the last 72 hours.   PTT No results found for: APTT      RADIOLOGY     (See actual reports for details)    ASSESSMENT/PLAN     Patient Active Problem List   Diagnosis    GERD (gastroesophageal reflux disease)    Anxiety    Lipids abnormal    Hypertension    Heart disease    Atrial fibrillation (HCC)    Pulmonary fibrosis (HCC)    Pneumonia    Mixed hyperlipidemia    Acute cystitis without hematuria    Interstitial lung disease ([facility] [address]    Centrilobular emphysema (HCC)    Pulmonary hypertension (HCC)    Chronic diastolic CHF (congestive heart failure) (HCC)    Pulmonary embolus (HCC)    CAD (coronary artery disease), native coronary artery    Hypertensive heart disease without heart failure    Hypotension    Hypotension, unspecified    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 Hypoxia complicated by volume overload  bnp 7640 on April 4    Achieving negative fluid balance with stable creatinine  Will cont iv lasix  Repeat BNP and CXR in am    Very extensive conversation with pt and son took place. Multiple questions answered.   Pt has a difficult time understanding her diease process    Electronically signed by Ed James MD on 4/10/2019 at 7:10 PM

## 2019-04-10 NOTE — PLAN OF CARE
Nutrition Problem: Altered nutrition-related lab values  Intervention: Food and/or Nutrient Delivery: Continue current diet(add fluid restriction to diet order per cardiology recommendation but wasn't ordered)  Nutritional Goals: Adequate nutrition intake or provision

## 2019-04-10 NOTE — FLOWSHEET NOTE
Patient alone in room sitting up in bed. 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 Patient appears to have good family support.  was able to offer emotional and spiritual support. 04/10/19 1800   Encounter Summary   Services provided to: Patient   Referral/Consult From: [address] Family members   Continue Visiting   (4/10/19)   Complexity of Encounter Low   Length of Encounter 15 minutes   Routine   Type Follow up   Assessment Calm; Approachable   Intervention Active listening;Explored feelings, thoughts, concerns;Sustaining presence/ Ministry of presence; Discussed illness/injury and it's impact   Outcome Expressed gratitude;Engaged in conversation;Coping;Receptive   Visited by aPm Law

## 2019-04-10 NOTE — PLAN OF CARE
Problem: Breathing Pattern - Ineffective:  Goal: Ability to achieve and maintain a regular respiratory rate will improve  Description  Ability to achieve and maintain a regular respiratory rate will improve  4/10/2019 0451 by Hernan Johnson RN  Outcome: Ongoing  4/10/2019 0448 by Hernan Johnson RN  Note:   Pt alternates between high flow and bipap. Tolerating well. 4/9/2019 1917 by Carmel Gomez RN  Outcome: Ongoing     Problem: Falls - Risk of:  Goal: Will remain free from falls  Description  Will remain free from falls  4/10/2019 0451 by Hernan Johnson RN  Outcome: Ongoing  4/10/2019 0448 by Hernan Johnson RN  Note:   Gait steady. Bed down and locked. Uses call light appropriately.  Safe environment provided   4/9/2019 1917 by Carmel Gomez RN  Outcome: Ongoing

## 2019-04-10 NOTE — FLOWSHEET NOTE
04/10/19 1450   Encounter Summary   Services provided to: Patient   Referral/Consult From: Todd   Continue Visiting   (4/10/19V)   Volunteer Visit Yes   Complexity of Encounter Low   Length of Encounter 15 minutes   Routine   Type Follow up   Spiritual/Congregation   Type Spiritual support   Intervention Communion   Sacraments   Communion Patient received communion

## 2019-04-10 NOTE — PROGRESS NOTES
input(s): DDIMER in the last 72 hours. Troponin  No results for input(s): TROPONINI in the last 72 hours. No results for input(s): TROPONINT in the last 72 hours. BNP No results for input(s): BNP in the last 72 hours. No results for input(s): PROBNP in the last 72 hours. Pulse Ox: SpO2  Av.1 %  Min: 86 %  Max: 93 %  Supplemental O2: O2 Flow Rate (L/min): 50 L/min     Current Meds:    potassium chloride  20 mEq Oral BID WC    ipratropium-albuterol  1 ampule Inhalation Q4H While awake    ciprofloxacin  250 mg Oral 2 times per day    furosemide  20 mg Intravenous BID    apixaban  2.5 mg Oral BID    aspirin  81 mg Oral Daily    atorvastatin  80 mg Oral Daily    calcium carbonate-vitamin D  1 tablet Oral Daily    citalopram  20 mg Oral Daily    metoprolol succinate  100 mg Oral Daily    pantoprazole  40 mg Oral QAM AC    sodium chloride flush  10 mL Intravenous 2 times per day     Continuous Infusions:          ASSESSMENT     Principal Problem:    Acute on chronic diastolic CHF (congestive heart failure) (HCC)  Active Problems:    GERD (gastroesophageal reflux disease)    Anxiety    Hypertension    Atrial fibrillation (HCC)    Pulmonary fibrosis (HCC)    Mixed hyperlipidemia    Acute cystitis without hematuria    Interstitial lung disease (HCC)    Centrilobular emphysema (HCC)    Pulmonary hypertension (HCC)    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 *    Acute on chronic Right and left heart failure with chronic cor pulmonale. LVEF 45-50%.     Severe tricuspid regurgitation, severe pulmonary hypertension with RVSP 70-75 mm Hg, severely dilated right 23, moderate to severe RV systolic dysfunction.     Chronic atrial fibrillation.   On chronic oral anticoagulation using Eliquis 2.5 mg twice a day.     Atherosclerotic coronary artery disease, CABG twice in 1992 in 2010.     Chronic shortness of breath at rest with idiopathic pulmonary fibrosis, associated to peripheral cyanosis and clubbing of the fingers. PLAN     Creatinine remains stable. Continue IV Lasix. Follow creatinine daily. Daily weights. Fluid restriction. -1.7 L since admission. Continue current cardiac medication. Palliative care consultation noted. Continue to follow.       Electronically signed by Kaela Coleman MD on 4/10/2019 at 12:48 PM

## 2019-04-10 NOTE — PLAN OF CARE
VSS, assessments as charted. Labs reviewed and reported PRN. Meds given as ordered. Patient uses call light appropriately. No attempts to get out of bed without assistance. Safety maintained.

## 2019-04-11 ENCOUNTER — APPOINTMENT (OUTPATIENT)
Dept: GENERAL RADIOLOGY | Age: 79
DRG: 291 | End: 2019-04-11
Payer: MEDICARE

## 2019-04-11 LAB
ABSOLUTE EOS #: 0.1 K/UL (ref 0–0.4)
ABSOLUTE IMMATURE GRANULOCYTE: ABNORMAL K/UL (ref 0–0.3)
ABSOLUTE LYMPH #: 0.6 K/UL (ref 1–4.8)
ABSOLUTE MONO #: 0.8 K/UL (ref 0.1–1.3)
ANION GAP SERPL CALCULATED.3IONS-SCNC: 11 MMOL/L (ref 9–17)
BASOPHILS # BLD: 1 % (ref 0–2)
BASOPHILS ABSOLUTE: 0.1 K/UL (ref 0–0.2)
BNP INTERPRETATION: ABNORMAL
BUN BLDV-MCNC: 29 MG/DL (ref 8–23)
BUN/CREAT BLD: ABNORMAL (ref 9–20)
CALCIUM SERPL-MCNC: 9.6 MG/DL (ref 8.6–10.4)
CHLORIDE BLD-SCNC: 93 MMOL/L (ref 98–107)
CO2: 31 MMOL/L (ref 20–31)
CREAT SERPL-MCNC: 1 MG/DL (ref 0.5–0.9)
DIFFERENTIAL TYPE: ABNORMAL
EOSINOPHILS RELATIVE PERCENT: 1 % (ref 0–4)
GFR AFRICAN AMERICAN: >60 ML/MIN
GFR NON-AFRICAN AMERICAN: 53 ML/MIN
GFR SERPL CREATININE-BSD FRML MDRD: ABNORMAL ML/MIN/{1.73_M2}
GFR SERPL CREATININE-BSD FRML MDRD: ABNORMAL ML/MIN/{1.73_M2}
GLUCOSE BLD-MCNC: 104 MG/DL (ref 70–99)
HCT VFR BLD CALC: 42.2 % (ref 36–46)
HEMOGLOBIN: 13.7 G/DL (ref 12–16)
IMMATURE GRANULOCYTES: ABNORMAL %
LYMPHOCYTES # BLD: 8 % (ref 24–44)
MCH RBC QN AUTO: 29.5 PG (ref 26–34)
MCHC RBC AUTO-ENTMCNC: 32.6 G/DL (ref 31–37)
MCV RBC AUTO: 90.5 FL (ref 80–100)
MONOCYTES # BLD: 12 % (ref 1–7)
NRBC AUTOMATED: ABNORMAL PER 100 WBC
PDW BLD-RTO: 19.9 % (ref 11.5–14.9)
PLATELET # BLD: 193 K/UL (ref 150–450)
PLATELET ESTIMATE: ABNORMAL
PMV BLD AUTO: 9.9 FL (ref 6–12)
POTASSIUM SERPL-SCNC: 4.9 MMOL/L (ref 3.7–5.3)
PRO-BNP: 9731 PG/ML
RBC # BLD: 4.66 M/UL (ref 4–5.2)
RBC # BLD: ABNORMAL 10*6/UL
SEG NEUTROPHILS: 78 % (ref 36–66)
SEGMENTED NEUTROPHILS ABSOLUTE COUNT: 5.2 K/UL (ref 1.3–9.1)
SODIUM BLD-SCNC: 135 MMOL/L (ref 135–144)
WBC # BLD: 6.7 K/UL (ref 3.5–11)
WBC # BLD: ABNORMAL 10*3/UL

## 2019-04-11 PROCEDURE — 99232 SBSQ HOSP IP/OBS MODERATE 35: CPT | Performed by: FAMILY MEDICINE

## 2019-04-11 PROCEDURE — 94640 AIRWAY INHALATION TREATMENT: CPT

## 2019-04-11 PROCEDURE — 6370000000 HC RX 637 (ALT 250 FOR IP): Performed by: INTERNAL MEDICINE

## 2019-04-11 PROCEDURE — 6360000002 HC RX W HCPCS: Performed by: INTERNAL MEDICINE

## 2019-04-11 PROCEDURE — 71045 X-RAY EXAM CHEST 1 VIEW: CPT

## 2019-04-11 PROCEDURE — 94762 N-INVAS EAR/PLS OXIMTRY CONT: CPT

## 2019-04-11 PROCEDURE — 6360000002 HC RX W HCPCS: Performed by: STUDENT IN AN ORGANIZED HEALTH CARE EDUCATION/TRAINING PROGRAM

## 2019-04-11 PROCEDURE — 83880 ASSAY OF NATRIURETIC PEPTIDE: CPT

## 2019-04-11 PROCEDURE — 6370000000 HC RX 637 (ALT 250 FOR IP): Performed by: STUDENT IN AN ORGANIZED HEALTH CARE EDUCATION/TRAINING PROGRAM

## 2019-04-11 PROCEDURE — 94660 CPAP INITIATION&MGMT: CPT

## 2019-04-11 PROCEDURE — 85025 COMPLETE CBC W/AUTO DIFF WBC: CPT

## 2019-04-11 PROCEDURE — 6370000000 HC RX 637 (ALT 250 FOR IP): Performed by: FAMILY MEDICINE

## 2019-04-11 PROCEDURE — 2060000000 HC ICU INTERMEDIATE R&B

## 2019-04-11 PROCEDURE — 2700000000 HC OXYGEN THERAPY PER DAY

## 2019-04-11 PROCEDURE — 36415 COLL VENOUS BLD VENIPUNCTURE: CPT

## 2019-04-11 PROCEDURE — 2580000003 HC RX 258: Performed by: STUDENT IN AN ORGANIZED HEALTH CARE EDUCATION/TRAINING PROGRAM

## 2019-04-11 PROCEDURE — 80048 BASIC METABOLIC PNL TOTAL CA: CPT

## 2019-04-11 RX ORDER — METHYLPREDNISOLONE SODIUM SUCCINATE 125 MG/2ML
60 INJECTION, POWDER, LYOPHILIZED, FOR SOLUTION INTRAMUSCULAR; INTRAVENOUS EVERY 8 HOURS
Status: DISCONTINUED | OUTPATIENT
Start: 2019-04-11 | End: 2019-04-13

## 2019-04-11 RX ORDER — IPRATROPIUM BROMIDE AND ALBUTEROL SULFATE 2.5; .5 MG/3ML; MG/3ML
1 SOLUTION RESPIRATORY (INHALATION) 4 TIMES DAILY
Status: DISCONTINUED | OUTPATIENT
Start: 2019-04-11 | End: 2019-04-19 | Stop reason: HOSPADM

## 2019-04-11 RX ADMIN — IPRATROPIUM BROMIDE AND ALBUTEROL SULFATE 1 AMPULE: .5; 3 SOLUTION RESPIRATORY (INHALATION) at 20:34

## 2019-04-11 RX ADMIN — IPRATROPIUM BROMIDE AND ALBUTEROL SULFATE 1 AMPULE: .5; 3 SOLUTION RESPIRATORY (INHALATION) at 17:16

## 2019-04-11 RX ADMIN — APIXABAN 2.5 MG: 2.5 TABLET, FILM COATED ORAL at 09:56

## 2019-04-11 RX ADMIN — METOPROLOL SUCCINATE 100 MG: 100 TABLET, EXTENDED RELEASE ORAL at 09:57

## 2019-04-11 RX ADMIN — PANTOPRAZOLE SODIUM 40 MG: 40 TABLET, DELAYED RELEASE ORAL at 09:56

## 2019-04-11 RX ADMIN — POTASSIUM CHLORIDE 20 MEQ: 1500 TABLET, EXTENDED RELEASE ORAL at 09:56

## 2019-04-11 RX ADMIN — METHYLPREDNISOLONE SODIUM SUCCINATE 60 MG: 125 INJECTION, POWDER, FOR SOLUTION INTRAMUSCULAR; INTRAVENOUS at 21:21

## 2019-04-11 RX ADMIN — Medication 10 ML: at 17:09

## 2019-04-11 RX ADMIN — CIPROFLOXACIN HYDROCHLORIDE 250 MG: 250 TABLET, FILM COATED ORAL at 21:20

## 2019-04-11 RX ADMIN — IPRATROPIUM BROMIDE AND ALBUTEROL SULFATE 1 AMPULE: .5; 3 SOLUTION RESPIRATORY (INHALATION) at 08:31

## 2019-04-11 RX ADMIN — Medication 10 ML: at 22:20

## 2019-04-11 RX ADMIN — METHYLPREDNISOLONE SODIUM SUCCINATE 60 MG: 125 INJECTION, POWDER, FOR SOLUTION INTRAMUSCULAR; INTRAVENOUS at 15:00

## 2019-04-11 RX ADMIN — POTASSIUM CHLORIDE 20 MEQ: 1500 TABLET, EXTENDED RELEASE ORAL at 17:07

## 2019-04-11 RX ADMIN — CIPROFLOXACIN HYDROCHLORIDE 250 MG: 250 TABLET, FILM COATED ORAL at 09:57

## 2019-04-11 RX ADMIN — FUROSEMIDE 20 MG: 10 INJECTION, SOLUTION INTRAMUSCULAR; INTRAVENOUS at 17:07

## 2019-04-11 RX ADMIN — APIXABAN 2.5 MG: 2.5 TABLET, FILM COATED ORAL at 21:20

## 2019-04-11 RX ADMIN — CITALOPRAM HYDROBROMIDE 20 MG: 20 TABLET ORAL at 09:56

## 2019-04-11 RX ADMIN — IPRATROPIUM BROMIDE AND ALBUTEROL SULFATE 1 AMPULE: .5; 3 SOLUTION RESPIRATORY (INHALATION) at 12:21

## 2019-04-11 RX ADMIN — ATORVASTATIN CALCIUM 80 MG: 80 TABLET, FILM COATED ORAL at 21:21

## 2019-04-11 RX ADMIN — Medication 1 TABLET: at 09:56

## 2019-04-11 RX ADMIN — Medication 10 ML: at 09:55

## 2019-04-11 RX ADMIN — MORPHINE SULFATE 2 MG: 2 INJECTION, SOLUTION INTRAMUSCULAR; INTRAVENOUS at 22:17

## 2019-04-11 RX ADMIN — FUROSEMIDE 20 MG: 10 INJECTION, SOLUTION INTRAMUSCULAR; INTRAVENOUS at 09:55

## 2019-04-11 RX ADMIN — ASPIRIN 81 MG: 81 TABLET, COATED ORAL at 09:57

## 2019-04-11 ASSESSMENT — PAIN SCALES - GENERAL
PAINLEVEL_OUTOF10: 0
PAINLEVEL_OUTOF10: 3
PAINLEVEL_OUTOF10: 0
PAINLEVEL_OUTOF10: 0
PAINLEVEL_OUTOF10: 5
PAINLEVEL_OUTOF10: 0
PAINLEVEL_OUTOF10: 0

## 2019-04-11 ASSESSMENT — PAIN DESCRIPTION - PAIN TYPE: TYPE: CHRONIC PAIN

## 2019-04-11 ASSESSMENT — PAIN DESCRIPTION - LOCATION: LOCATION: GENERALIZED

## 2019-04-11 NOTE — PROGRESS NOTES
Pulmonologist had long discussion with patient and her sons re: illness and prognosis. I called patient's son Hans Esquivel on the phone, we discussed hospice as an option. Hans Esquivel states he has already been in contact with [facility] for information. I offered to arrange a hospice meeting and he is agreeable. [facility] coming tomorrow at 11:30am to see patient and family. Will follow up after the meeting tomorrow. Emotional support offered to son.     [address] [place] [address], RN  [phone]

## 2019-04-11 NOTE — PROGRESS NOTES
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    Iatrogenic hypotension    Lung nodule, solitary    S/P CABG x 4    Acute on chronic respiratory failure with hypoxemia (HCC)    Hypoxia    Moderate malnutrition (HCC)    CHF, left ventricular (HCC)    Acute on chronic diastolic CHF (congestive heart failure) ([facility] [address]       Palliative Interaction: Pt alert and oriented with a flat affect. She is in isolation for C-Diff.  Amberly Baca is at bedside. I had a talk with patient about her condition and how treatment is going. Pt is depressed and states, \"I don't want to live my life like this\". I talked with her about asking her oncologist if there is another regimen they can put her on and patient states, \"there is no other regimen, this is it\". I asked her if she has ever inquired about lowering the dose of chemotherapy. Pt states, \"Well, after I got sick the first time he took me off of the one chemo and I did fine the second time, so the third time he gave me half the dose of the chemo he took me off and here I am\". I encouraged patient to talk with her oncologist again about her quality of life if continuing treatment is what she wants to do. Emotional support offered to her and her . Did not stay long as patient is not feeling good. Pt is currently a DNRCCA. Will continue to follow for support. Goals/Plan of care  Education/support to family  Education/support to patient  Discharge planning/helping to coordinate care  Communications with primary service  Providing support for coping/adaptation/distress of family  Discussing meaning/purpose   Continue with current plan of care  Clarification of medical condition to patient and family  Code status clarified: DNRCCA  Pt still not feeling well today but states she feels better than when she came in. Pt is discouraged about her quality of life. I encouraged her to talk with her doctor about treatment options.  Emotional support offered. Will continue to follow.       Palliative Care Coordinator  Leyda Ta RN, [address] [facility] Office: [address] [place] Office: [phone]  Work Cell Phone: [phone]    For Symptom Management Clinic scheduling please call [phone]

## 2019-04-11 NOTE — FLOWSHEET NOTE
Patient shared that her sister  this week after battling cancer for some time. Patient expressed that she felt her time was \"coming to an end.\" When writer asked how she was coping, she said, \"I'm looking forward to God taking me home. \" She is at peace. 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 Patient's leanne sustains her and prayer calms her.      19 1051   Encounter Summary   Services provided to: Patient   Referral/Consult From: Palliative Care   Continue Visiting   (19)   Complexity of Encounter Moderate   Length of Encounter 15 minutes   Spiritual Assessment Completed Yes   Routine   Type Follow up   Spiritual/Church   Type Spiritual support   Assessment Calm; Approachable   Intervention Active listening;Nurtured hope;Prayer;Sustaining presence/ Ministry of presence; Discussed belief system/Roman [employer] practices/leanne;Discussed illness/injury and it's impact   Outcome Expressed gratitude;Expressed feelings/needs/concerns;Engaged in conversation

## 2019-04-11 NOTE — PROGRESS NOTES
Writer present at bedside with Dr. Tyler Ayala during code status and poor prognosis discussion with patient and family. Pt wishes to be DNR-CCA and be made comfortable. Pt requesting hospice options be presented to her.       Electronically signed by Oksana Reyes RN on 4/11/2019 at 7:58 PM

## 2019-04-11 NOTE — PROGRESS NOTES
Writer received phone call from anonymous phone number at 2003 today. Individual could not be understood as was yelling  obscenities on the phone. Writer than heard individual state \"my mom is in the f**ing basement lost\". Writer than again asked individual who they were talking about, individual stated \"do you not know anything my mom called and said she is lost in the f**ing basement. \" Individual stated that the pt is in 2013. After multiple attempts to calm and reassure individual that his mother was okay and in her room, he stated \"well can someone go f**ing check on her. \" Pt was in bed, in room 2013 with bed alarm on, and assessed. Pt safe, dry, and comfortable with call light in hand when assessed.

## 2019-04-11 NOTE — PROGRESS NOTES
Writer educated pt. And power of attourney  On oxygen, WRITER HAD RESP. THERAPIST COME IN AND EDUCATED PT. And son/POA. Also answered all questions. Pt. And family stated no other questions.

## 2019-04-11 NOTE — PROGRESS NOTES
Discussed patient's situation with her primary outpatient pulmonologist Dr. Brandan Shin. Explained to him that she is still on high flow and has combined underlying severe cardiac and pulmonary disease. I told him that she is not getting better despite aggressive diuresis as well as IV steroids and high flow oxygen. He and I both don't feel that she will be eligible for the new trial for only fibrosis. There has to be a life expectancy of at least 2 years and she has to be able to do a 6 minute walk. I do not feel that she can or will be able to do either. Thus, she will not be enrolled. Additionally, the trial will be randomized with his tissue would get the drug. Explained this to the patient and her 2 sons. Her son Keerthi Mcgee does not seem to understand and is demanding that she be \"given a try\". I told him that she is not eligible because she would not meet the criteria. Additionally, her performance status is extremely poor. Her son Rakesh Sanches who is the power of  and the patient both seem to understand. We'll be going into her home care agency to see what can be provided at home in terms of oxygen needs. I strongly suggest hospice evaluation.

## 2019-04-11 NOTE — PROGRESS NOTES
.. PALLIATIVE CARE NURSING ASSESSMENT    Patient: Kale Kaiser  Room: 2013/2013-01    Reason For Consult   Goals of care evaluation  Distress management  Guidance and support  Facilitate communications  Assistance in coordinating care      Impression: Kale Kaiser is a 78y.o. year old female  has a past medical history of Acute cystitis without hematuria, Anxiety, Atrial fibrillation ([address] Orantes's esophagus, Centrilobular emphysema ([address] Chronic diastolic CHF (congestive heart failure) ([address] Chronic hypoxemic respiratory failure ([address] GERD (gastroesophageal reflux disease), Heart disease, Hypertension, Hypotension, unspecified, Iatrogenic hypotension, Interstitial lung disease ([address] Lipids abnormal, Mixed hyperlipidemia, LIANET on CPAP, Pneumonia, Pulmonary embolus ([address] Pulmonary fibrosis ([address] Pulmonary hypertension ([address] S/P CABG x 4, and UIP (usual interstitial pneumonitis) ([address] .  Currently hospitalized for the management of CHF. The Palliative Care Team is following to assist with chronic disease support. Vital Signs  Blood pressure 121/78, pulse 76, temperature 98.7 °F (37.1 °C), temperature source Axillary, resp. rate 21, height 5' 6\" (1.676 m), weight 223 lb 13 oz (101.5 kg), last menstrual period 11/01/1985, SpO2 (!) 88 %, not currently breastfeeding.     Patient Active Problem List   Diagnosis    GERD (gastroesophageal reflux disease)    Anxiety    Lipids abnormal    Hypertension    Heart disease    Atrial fibrillation ([address]    Pulmonary fibrosis ([address]    Pneumonia    Mixed hyperlipidemia    Acute cystitis without hematuria    Interstitial lung disease ([address]    Centrilobular emphysema ([address]    Pulmonary hypertension (HCC)    Chronic diastolic CHF (congestive heart failure) (HCC)    Pulmonary embolus (HCC)    CAD (coronary artery disease), native coronary artery    Hypertensive heart disease without heart failure    Hypotension    Hypotension, unspecified    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    Iatrogenic hypotension    Lung nodule, solitary    S/P CABG x 4    Acute on chronic respiratory failure with hypoxemia (HCC)    Hypoxia    Moderate malnutrition (HCC)    CHF, left ventricular (HCC)    Acute on chronic diastolic CHF (congestive heart failure) (HCC)       Palliative Interaction: Pt alert and talkative on high flow O2. Pt seems to be oriented but bedside nurse tells me she has periods of intermittent confusion. Pt O2 sat on high flow is 87% when I was in the room. There is no family at bedside. Pt had a period of confusion during the night. Talked with patient about her condition and she is hopeful to go back home. She knows she may have to go to rehab. She denies any pain and does not appear to be in distress but does appear SOB with talking. I talked with pulmonologist and let him know we are available if needed. Not sure how much more improved patient's respiratory status will become. Will be available to talk with family once that is established. Pt is currently a limited code (no intubation). Emotional support offered to patient. Will follow for any needs and try to connect with family for support. Goals/Plan of care  Education/support to patient  Discharge planning/helping to coordinate care  Communications with primary service  Providing support for coping/adaptation/distress of patient  Discussing meaning/purpose   Continue with current plan of care  Clarification of medical condition to patient and family  Need to establish baseline lung condition and how much patient will improve before introducing services. Will follow along for support.           Palliative Care Coordinator  Cinthya Grajeda RN, [address] [facility] Office: [address] [place] Office: [phone]  Work Cell Phone: [phone]    For Symptom Management Clinic scheduling please call [phone]

## 2019-04-11 NOTE — PROGRESS NOTES
2 RN' s checking on pt, pt. Confused. Call light in hand, cell phone in other hand. Writer continued to talk with pt. Pt. Stated you know when you just wake up, you can get confused.

## 2019-04-11 NOTE — PROGRESS NOTES
FAMILY MEDICINE  - PROGRESS NOTE    Date:  4/11/2019  Dre Isidro  [id number]      Chief Complaint   Patient presents with    Shortness of Breath         Interval History:  Improved but still on high flow O2. She has no new complaints.       Subjective  Respiratory: positive for emphysema and shortness of breath  Cardiovascular: positive for irregular heart beat  Gastrointestinal: positive for reflux symptoms  Musculoskeletal:positive for arthralgias, myalgias and stiff joints  Behavioral/Psych: positive for anxiety and obesity:    Objective:    BP (!) 112/58   Pulse 79   Temp 98.6 °F (37 °C)   Resp 22   Ht 5' 6\" (1.676 m)   Wt 223 lb 13 oz (101.5 kg)   LMP 11/01/1985   SpO2 90%   BMI 36.12 kg/m²   General appearance - alert, well appearing, and in no distress and overweight  Mental status - alert, oriented to person, place, and time  Eyes - pupils equal and reactive, extraocular eye movements intact  Ears - hearing grossly normal bilaterally  Nose - normal and patent, no erythema, discharge or polyps  Mouth - mucous membranes moist, pharynx normal without lesions  Neck - supple, no significant adenopathy  Lymphatics - no palpable lymphadenopathy, no hepatosplenomegaly  Chest - decreased air entry noted posteriorly  Heart - irregularly irregular rhythm with rate 79  Abdomen - soft, nontender, nondistended, no masses or organomegaly  Breasts - not examined  Back exam - not examined  Neurological - alert, oriented, normal speech, no focal findings or movement disorder noted  Musculoskeletal - osteoarthritic changes noted in both hands  Extremities - pedal edema trace +  Skin - normal coloration and turgor, no rashes, no suspicious skin lesions noted    Data:   Medications:   Current Facility-Administered Medications   Medication Dose Route Frequency Provider Last Rate Last Dose    potassium chloride (KLOR-CON M) extended release tablet 20 mEq  20 mEq Oral BID WC Nick Bloch Adusumilli, MD   20 mEq at 04/10/19 1639    ipratropium-albuterol (DUONEB) nebulizer solution 1 ampule  1 ampule Inhalation Q4H While awake Jodee Antoine MD   1 ampule at 04/10/19 1941    ciprofloxacin (CIPRO) tablet 250 mg  250 mg Oral 2 times per day Asa Cristina MD   250 mg at 04/10/19 2044    furosemide (LASIX) injection 20 mg  20 mg Intravenous BID Nick Bloch Adusumilli, MD   20 mg at 04/10/19 1639    apixaban (ELIQUIS) tablet 2.5 mg  2.5 mg Oral BID Venessa Hairston MD   2.5 mg at 04/10/19 2044    aspirin EC tablet 81 mg  81 mg Oral Daily Venessa Hairston MD   81 mg at 04/10/19 9697    atorvastatin (LIPITOR) tablet 80 mg  80 mg Oral Daily Venessa Hairston MD   80 mg at 04/10/19 2044    calcium carbonate-vitamin D (CALTRATE) 600-400 MG-UNIT per tab 1 tablet  1 tablet Oral Daily Venessa Hairston MD   1 tablet at 04/10/19 0905    citalopram (CELEXA) tablet 20 mg  20 mg Oral Daily Venessa Hairston MD   20 mg at 04/10/19 5862    LORazepam (ATIVAN) tablet 0.5 mg  0.5 mg Oral Q8H PRN Venessa Hairston MD   0.5 mg at 04/10/19 2155    metoprolol succinate (TOPROL XL) extended release tablet 100 mg  100 mg Oral Daily Venessa Hairston MD   100 mg at 04/10/19 0903    pantoprazole (PROTONIX) tablet 40 mg  40 mg Oral QAM AC Venessa Hairston MD   40 mg at 04/10/19 0553    sodium chloride flush 0.9 % injection 10 mL  10 mL Intravenous 2 times per day Venessa Hairston MD   10 mL at 04/10/19 2300    sodium chloride flush 0.9 % injection 10 mL  10 mL Intravenous PRN Venessa Hairston MD   10 mL at 04/04/19 2048    acetaminophen (TYLENOL) tablet 650 mg  650 mg Oral Q4H PRN Venessa Hairston MD        morphine (PF) injection 2 mg  2 mg Intravenous Q2H PRN Venessa Hairston MD   2 mg at 04/10/19 2339    Or    morphine sulfate (PF) injection 4 mg  4 mg Intravenous Q2H PRN Venessa Hairston MD        ondansetron [facility]) injection 4 mg  4 mg Intravenous Q8H PRN Venessa Hairston MD           Intake/Output Summary (Last 24 hours) at 4/11/2019 0534  Last data filed at 4/11/2019 0514  Gross per 24 hour   Intake 1190 ml   Output 2350 ml   Net -1160 ml     Recent Results (from the past 24 hour(s))   Basic Metabolic Prof    Collection Time: 04/11/19  4:10 AM   Result Value Ref Range    Glucose 104 (H) 70 - 99 mg/dL    BUN 29 (H) 8 - 23 mg/dL    CREATININE 1.00 (H) 0.50 - 0.90 mg/dL    Bun/Cre Ratio NOT REPORTED 9 - 20    Calcium 9.6 8.6 - 10.4 mg/dL    Sodium 135 135 - 144 mmol/L    Potassium 4.9 3.7 - 5.3 mmol/L    Chloride 93 (L) 98 - 107 mmol/L    CO2 31 20 - 31 mmol/L    Anion Gap 11 9 - 17 mmol/L    GFR Non-African American 53 (L) >60 mL/min    GFR African American >60 >60 mL/min    GFR Comment          GFR Staging NOT REPORTED    CBC with DIFF    Collection Time: 04/11/19  4:10 AM   Result Value Ref Range    WBC 6.7 3.5 - 11.0 k/uL    RBC 4.66 4.0 - 5.2 m/uL    Hemoglobin 13.7 12.0 - 16.0 g/dL    Hematocrit 42.2 36 - 46 %    MCV 90.5 80 - 100 fL    MCH 29.5 26 - 34 pg    MCHC 32.6 31 - 37 g/dL    RDW 19.9 (H) 11.5 - 14.9 %    Platelets 171 862 - 477 k/uL    MPV 9.9 6.0 - 12.0 fL    NRBC Automated NOT REPORTED per 100 WBC    Differential Type NOT REPORTED     Seg Neutrophils 78 (H) 36 - 66 %    Lymphocytes 8 (L) 24 - 44 %    Monocytes 12 (H) 1 - 7 %    Eosinophils % 1 0 - 4 %    Basophils 1 0 - 2 %    Immature Granulocytes NOT REPORTED 0 %    Segs Absolute 5.20 1.3 - 9.1 k/uL    Absolute Lymph # 0.60 (L) 1.0 - 4.8 k/uL    Absolute Mono # 0.80 0.1 - 1.3 k/uL    Absolute Eos # 0.10 0.0 - 0.4 k/uL    Basophils # 0.10 0.0 - 0.2 k/uL    Absolute Immature Granulocyte NOT REPORTED 0.00 - 0.30 k/uL    WBC Morphology NOT REPORTED     RBC Morphology NOT REPORTED     Platelet Estimate NOT REPORTED    Brain Natriuretic Peptide    Collection Time: 04/11/19  4:10 AM   Result Value Ref Range    Pro-BNP 9,731 (H) <300 pg/mL    BNP Interpretation Pro-BNP Reference Range: -----------------------------------------------------------------  RAD:  EKG:  Micro:     Assessment & Plan:    Patient Active Problem List:     GERD (gastroesophageal reflux disease)     Anxiety     Lipids abnormal     Hypertension     Heart disease     Atrial fibrillation (HCC)     Pulmonary fibrosis (HCC)     Pneumonia     Mixed hyperlipidemia     Acute cystitis without hematuria     Interstitial lung disease (HCC)     Centrilobular emphysema (HCC)     Pulmonary hypertension (HCC)     Chronic diastolic CHF (congestive heart failure) (HCC)     Pulmonary embolus (HCC)     CAD (coronary artery disease), native coronary artery     Hypertensive heart disease without heart failure     Hypotension     Hypotension, unspecified     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     Iatrogenic hypotension     Lung nodule, solitary     S/P CABG x 4     Acute on chronic respiratory failure with hypoxemia (HCC)     Hypoxia     Moderate malnutrition (HCC)     CHF, left ventricular (HCC)     Acute on chronic diastolic CHF (congestive heart failure) ([facility] [address]           Plan:  COPD, Pulmonary fibrosis - further plans per Pulmonology. Chronic CHF - further plans per Cardiology. Continue current treatments. Complete orders per chart.     See orders   Disposition:    Electronically signed by James Alcala MD on 4/11/2019 at 5:34 AM

## 2019-04-11 NOTE — PLAN OF CARE
Problem: Falls - Risk of:  Goal: Will remain free from falls  Description  Will remain free from falls  4/10/2019 2254 by Tayo Conley RN  Outcome: Met This Shift  4/10/2019 1716 by Courtney Youngblood RN  Outcome: Ongoing  Goal: Absence of physical injury  Description  Absence of physical injury  4/10/2019 2254 by Tayo Conley RN  Outcome: Met This Shift  4/10/2019 1716 by Courtney Youngblood RN  Outcome: Ongoing     Problem: Risk for Impaired Skin Integrity  Goal: Tissue integrity - skin and mucous membranes  Description  Structural intactness and normal physiological function of skin and  mucous membranes.   4/10/2019 2254 by Tayo Conley RN  Outcome: Met This Shift  4/10/2019 1716 by Courtney Youngblood RN  Outcome: Ongoing     Problem: Musculor/Skeletal Functional Status  Goal: Highest potential functional level  4/10/2019 2254 by Tayo Conley RN  Outcome: Met This Shift  4/10/2019 1716 by Courtney Youngblood RN  Outcome: Ongoing  Goal: Absence of falls  4/10/2019 2254 by Tayo Conley RN  Outcome: Met This Shift  4/10/2019 1716 by Courtney Youngblood RN  Outcome: Ongoing     Problem: Pain:  Goal: Pain level will decrease  Description  Pain level will decrease  4/10/2019 2254 by Tayo Conley RN  Outcome: Met This Shift  4/10/2019 1716 by Courtney Youngblood RN  Outcome: Ongoing  Goal: Control of acute pain  Description  Control of acute pain  4/10/2019 2254 by Tayo Conley RN  Outcome: Met This Shift  4/10/2019 1716 by Courtney Youngblood RN  Outcome: Ongoing  Goal: Control of chronic pain  Description  Control of chronic pain  4/10/2019 2254 by Tayo Conley RN  Outcome: Met This Shift  4/10/2019 1716 by Courtney Youngblood RN  Outcome: Ongoing

## 2019-04-11 NOTE — PROGRESS NOTES
Progress Note    Patient Name:  Lucio Mccord    :  1940  2019 12:09 PM      SUBJECTIVE     Continues to feel better. Remains on high flow nasal cannula. OBJECTIVE     Vital signs:    /78   Pulse 76   Temp 98.7 °F (37.1 °C) (Axillary)   Resp 21   Ht 5' 6\" (1.676 m)   Wt 223 lb 13 oz (101.5 kg)   LMP 1985   SpO2 (!) 88%   BMI 36.12 kg/m²  50 L/min      Admit Weight:  210 lb (95.3 kg)    Last 3 weights: Wt Readings from Last 3 Encounters:   19 223 lb 13 oz (101.5 kg)   19 194 lb (88 kg)   19 189 lb (85.7 kg)       BMI: Body mass index is 36.12 kg/m². Input/Output:       Intake/Output Summary (Last 24 hours) at 2019 1209  Last data filed at 2019 0514  Gross per 24 hour   Intake 490 ml   Output 1300 ml   Net -810 ml       Date 19 0000 - 19 2359   Shift 3750-1975 3736-4592 7785-4420 24 Hour Total   INTAKE   P.O.(mL/kg/hr) 250(0.3)   250   Shift Total(mL/kg) 250(2.5)   250(2.5)   OUTPUT   Urine(mL/kg/hr) 950(1.2)   950   Shift Total(mL/kg) 950(9.4)   950(9.4)   Weight (kg) 101.5 101.5 101.5 101.5     Exam:     General appearance: awake and alert moves all ext   Lungs:  Decreased air entry bibasilarly  Heart: S1 and S2 no murmur  Abdomen: positive bowel sounds, no bruits, no masses  Extremities:   Bilateral lower limb edema        Laboratory Studies:     CBC:   Recent Labs     19  0357 04/10/19  0433 19  0410   WBC 6.7 5.9 6.7   HGB 13.4 13.4 13.7   HCT 41.3 40.4 42.2   MCV 90.5 91.3 90.5    184 193     BMP:   Recent Labs     19  0357 04/10/19  0433 19  0410   * 135 135   K 4.4 4.5 4.9   CL 93* 95* 93*   CO2 30 32* 31   BUN 25* 28* 29*   CREATININE 0.93* 1.06* 1.00*     PT/INR: No results for input(s): PROTIME, INR in the last 72 hours. APTT: No results for input(s): APTT in the last 72 hours. MAG: No results for input(s): MG in the last 72 hours.   D Dimer: No results for input(s): DDIMER in the last 72 hours. Troponin  No results for input(s): TROPONINI in the last 72 hours. No results for input(s): TROPONINT in the last 72 hours. BNP No results for input(s): BNP in the last 72 hours. Recent Labs     19  0410   PROBNP 9,731*         Pulse Ox: SpO2  Av.6 %  Min: 85 %  Max: 94 %  Supplemental O2: O2 Flow Rate (L/min): 50 L/min     Current Meds:    ipratropium-albuterol  1 ampule Inhalation 4x daily    methylPREDNISolone  60 mg Intravenous Q8H    potassium chloride  20 mEq Oral BID WC    ciprofloxacin  250 mg Oral 2 times per day    furosemide  20 mg Intravenous BID    apixaban  2.5 mg Oral BID    aspirin  81 mg Oral Daily    atorvastatin  80 mg Oral Daily    calcium carbonate-vitamin D  1 tablet Oral Daily    citalopram  20 mg Oral Daily    metoprolol succinate  100 mg Oral Daily    pantoprazole  40 mg Oral QAM AC    sodium chloride flush  10 mL Intravenous 2 times per day     Continuous Infusions:          ASSESSMENT     Principal Problem:    Acute on chronic diastolic CHF (congestive heart failure) (HCC)  Active Problems:    GERD (gastroesophageal reflux disease)    Anxiety    Hypertension    Atrial fibrillation (HCC)    Pulmonary fibrosis (HCC)    Mixed hyperlipidemia    Acute cystitis without hematuria    Interstitial lung disease (HCC)    Centrilobular emphysema (HCC)    Pulmonary hypertension (HCC)    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 *    Acute on chronic Right and left heart failure with chronic cor pulmonale. LVEF 45-50%.     Severe tricuspid regurgitation, severe pulmonary hypertension with RVSP 70-75 mm Hg, severely dilated right 23, moderate to severe RV systolic dysfunction.     Chronic atrial fibrillation.   On chronic oral anticoagulation using Eliquis 2.5 mg twice a day.     Atherosclerotic coronary artery disease, CABG twice in 1992 in 2010.     Chronic shortness of breath at rest with idiopathic pulmonary fibrosis, associated to peripheral cyanosis and clubbing of the fingers. PLAN     Creatinine remains stable. Continue IV Lasix. Follow creatinine daily. Daily weights. Fluid restriction.    -2.5 L since admission. Continue current cardiac medication. Palliative care consultation noted. Continue to follow.       Electronically signed by Ernie Burgos MD on 4/11/2019 at 12:09 PM

## 2019-04-11 NOTE — FLOWSHEET NOTE
04/11/19 1103   Encounter Summary   Services provided to: Patient   Referral/Consult From: Todd   Continue Visiting   (4/11/19V)   Volunteer Visit Yes   Complexity of Encounter Low   Length of Encounter 15 minutes   Routine   Type Follow up   Spiritual/[employer]   Type Spiritual support   Intervention [address] Patient received communion

## 2019-04-12 LAB
ABSOLUTE EOS #: 0 K/UL (ref 0–0.4)
ABSOLUTE IMMATURE GRANULOCYTE: ABNORMAL K/UL (ref 0–0.3)
ABSOLUTE LYMPH #: 0.14 K/UL (ref 1–4.8)
ABSOLUTE MONO #: 0 K/UL (ref 0.1–1.3)
ANION GAP SERPL CALCULATED.3IONS-SCNC: 8 MMOL/L (ref 9–17)
BASOPHILS # BLD: 0 % (ref 0–2)
BASOPHILS ABSOLUTE: 0 K/UL (ref 0–0.2)
BUN BLDV-MCNC: 31 MG/DL (ref 8–23)
BUN/CREAT BLD: ABNORMAL (ref 9–20)
CALCIUM SERPL-MCNC: 9.2 MG/DL (ref 8.6–10.4)
CHLORIDE BLD-SCNC: 93 MMOL/L (ref 98–107)
CO2: 30 MMOL/L (ref 20–31)
CREAT SERPL-MCNC: 0.88 MG/DL (ref 0.5–0.9)
DIFFERENTIAL TYPE: ABNORMAL
EOSINOPHILS RELATIVE PERCENT: 0 % (ref 0–4)
GFR AFRICAN AMERICAN: >60 ML/MIN
GFR NON-AFRICAN AMERICAN: >60 ML/MIN
GFR SERPL CREATININE-BSD FRML MDRD: ABNORMAL ML/MIN/{1.73_M2}
GFR SERPL CREATININE-BSD FRML MDRD: ABNORMAL ML/MIN/{1.73_M2}
GLUCOSE BLD-MCNC: 158 MG/DL (ref 70–99)
HCT VFR BLD CALC: 43.8 % (ref 36–46)
HEMOGLOBIN: 13.9 G/DL (ref 12–16)
IMMATURE GRANULOCYTES: ABNORMAL %
LYMPHOCYTES # BLD: 3 % (ref 24–44)
MCH RBC QN AUTO: 29 PG (ref 26–34)
MCHC RBC AUTO-ENTMCNC: 31.8 G/DL (ref 31–37)
MCV RBC AUTO: 91 FL (ref 80–100)
MONOCYTES # BLD: 0 % (ref 1–7)
MORPHOLOGY: NORMAL
NRBC AUTOMATED: ABNORMAL PER 100 WBC
PDW BLD-RTO: 20.4 % (ref 11.5–14.9)
PLATELET # BLD: 196 K/UL (ref 150–450)
PLATELET ESTIMATE: ABNORMAL
PMV BLD AUTO: 9.5 FL (ref 6–12)
POTASSIUM SERPL-SCNC: 4.8 MMOL/L (ref 3.7–5.3)
RBC # BLD: 4.81 M/UL (ref 4–5.2)
RBC # BLD: ABNORMAL 10*6/UL
SEG NEUTROPHILS: 97 % (ref 36–66)
SEGMENTED NEUTROPHILS ABSOLUTE COUNT: 4.46 K/UL (ref 1.3–9.1)
SODIUM BLD-SCNC: 131 MMOL/L (ref 135–144)
WBC # BLD: 4.6 K/UL (ref 3.5–11)
WBC # BLD: ABNORMAL 10*3/UL

## 2019-04-12 PROCEDURE — 6370000000 HC RX 637 (ALT 250 FOR IP): Performed by: STUDENT IN AN ORGANIZED HEALTH CARE EDUCATION/TRAINING PROGRAM

## 2019-04-12 PROCEDURE — 85025 COMPLETE CBC W/AUTO DIFF WBC: CPT

## 2019-04-12 PROCEDURE — 2580000003 HC RX 258: Performed by: STUDENT IN AN ORGANIZED HEALTH CARE EDUCATION/TRAINING PROGRAM

## 2019-04-12 PROCEDURE — 6360000002 HC RX W HCPCS: Performed by: INTERNAL MEDICINE

## 2019-04-12 PROCEDURE — 94640 AIRWAY INHALATION TREATMENT: CPT

## 2019-04-12 PROCEDURE — 6360000002 HC RX W HCPCS: Performed by: STUDENT IN AN ORGANIZED HEALTH CARE EDUCATION/TRAINING PROGRAM

## 2019-04-12 PROCEDURE — 2060000000 HC ICU INTERMEDIATE R&B

## 2019-04-12 PROCEDURE — 36415 COLL VENOUS BLD VENIPUNCTURE: CPT

## 2019-04-12 PROCEDURE — 6370000000 HC RX 637 (ALT 250 FOR IP): Performed by: INTERNAL MEDICINE

## 2019-04-12 PROCEDURE — 99232 SBSQ HOSP IP/OBS MODERATE 35: CPT | Performed by: FAMILY MEDICINE

## 2019-04-12 PROCEDURE — 80048 BASIC METABOLIC PNL TOTAL CA: CPT

## 2019-04-12 PROCEDURE — 94762 N-INVAS EAR/PLS OXIMTRY CONT: CPT

## 2019-04-12 PROCEDURE — 2700000000 HC OXYGEN THERAPY PER DAY

## 2019-04-12 RX ADMIN — POTASSIUM CHLORIDE 20 MEQ: 1500 TABLET, EXTENDED RELEASE ORAL at 09:17

## 2019-04-12 RX ADMIN — IPRATROPIUM BROMIDE AND ALBUTEROL SULFATE 1 AMPULE: .5; 3 SOLUTION RESPIRATORY (INHALATION) at 21:18

## 2019-04-12 RX ADMIN — METHYLPREDNISOLONE SODIUM SUCCINATE 60 MG: 125 INJECTION, POWDER, FOR SOLUTION INTRAMUSCULAR; INTRAVENOUS at 05:54

## 2019-04-12 RX ADMIN — MORPHINE SULFATE 2 MG: 2 INJECTION, SOLUTION INTRAMUSCULAR; INTRAVENOUS at 23:43

## 2019-04-12 RX ADMIN — LORAZEPAM 0.5 MG: 0.5 TABLET ORAL at 23:03

## 2019-04-12 RX ADMIN — POTASSIUM CHLORIDE 20 MEQ: 1500 TABLET, EXTENDED RELEASE ORAL at 18:11

## 2019-04-12 RX ADMIN — ATORVASTATIN CALCIUM 80 MG: 80 TABLET, FILM COATED ORAL at 20:23

## 2019-04-12 RX ADMIN — METHYLPREDNISOLONE SODIUM SUCCINATE 60 MG: 125 INJECTION, POWDER, FOR SOLUTION INTRAMUSCULAR; INTRAVENOUS at 20:24

## 2019-04-12 RX ADMIN — IPRATROPIUM BROMIDE AND ALBUTEROL SULFATE 1 AMPULE: .5; 3 SOLUTION RESPIRATORY (INHALATION) at 10:54

## 2019-04-12 RX ADMIN — APIXABAN 2.5 MG: 2.5 TABLET, FILM COATED ORAL at 20:23

## 2019-04-12 RX ADMIN — IPRATROPIUM BROMIDE AND ALBUTEROL SULFATE 1 AMPULE: .5; 3 SOLUTION RESPIRATORY (INHALATION) at 15:12

## 2019-04-12 RX ADMIN — FUROSEMIDE 20 MG: 10 INJECTION, SOLUTION INTRAMUSCULAR; INTRAVENOUS at 09:17

## 2019-04-12 RX ADMIN — METOPROLOL SUCCINATE 100 MG: 100 TABLET, EXTENDED RELEASE ORAL at 09:18

## 2019-04-12 RX ADMIN — CITALOPRAM HYDROBROMIDE 20 MG: 20 TABLET ORAL at 09:18

## 2019-04-12 RX ADMIN — FUROSEMIDE 20 MG: 10 INJECTION, SOLUTION INTRAMUSCULAR; INTRAVENOUS at 18:14

## 2019-04-12 RX ADMIN — PANTOPRAZOLE SODIUM 40 MG: 40 TABLET, DELAYED RELEASE ORAL at 09:17

## 2019-04-12 RX ADMIN — APIXABAN 2.5 MG: 2.5 TABLET, FILM COATED ORAL at 09:18

## 2019-04-12 RX ADMIN — Medication 10 ML: at 20:24

## 2019-04-12 RX ADMIN — ASPIRIN 81 MG: 81 TABLET, COATED ORAL at 09:17

## 2019-04-12 RX ADMIN — IPRATROPIUM BROMIDE AND ALBUTEROL SULFATE 1 AMPULE: .5; 3 SOLUTION RESPIRATORY (INHALATION) at 07:47

## 2019-04-12 RX ADMIN — METHYLPREDNISOLONE SODIUM SUCCINATE 60 MG: 125 INJECTION, POWDER, FOR SOLUTION INTRAMUSCULAR; INTRAVENOUS at 13:00

## 2019-04-12 ASSESSMENT — PAIN SCALES - GENERAL
PAINLEVEL_OUTOF10: 0

## 2019-04-12 NOTE — FLOWSHEET NOTE
SC visit with patient, children and hospice rep prior to hospice meeting; patient asked for prayer for \"healing and her family\"; patient is concerned about her family and what they are going through; listening presence and support provided;     04/12/19 1140   Encounter Summary   Services provided to: Patient and family together   Referral/Consult From: [address] [place] [address] Visiting   (4/12/19)   Complexity of Encounter Moderate   Length of Encounter 15 minutes   Spiritual Assessment Completed Yes   Routine   Type Follow up   Spiritual/Moravian   Type Spiritual support   Assessment Approachable; Anxious; Hopeful;Coping   Intervention Active listening;Explored feelings, thoughts, concerns;Prayer;Sustaining presence/ Ministry of presence; Discussed illness/injury and it's impact   Outcome Comfort;Expressed gratitude;Engaged in conversation;Expressed feelings/needs/concerns;Coping; Hopeful;Receptive

## 2019-04-12 NOTE — PROGRESS NOTES
.. PALLIATIVE CARE NURSING ASSESSMENT    Patient: Tomy Luna  Room: 2013/2013-01    Reason For Consult   Goals of care evaluation  Distress management  Guidance and support  Facilitate communications  Assistance in coordinating care      Impression: Tomy Luna is a 78y.o. year old female  has a past medical history of Acute cystitis without hematuria, Anxiety, Atrial fibrillation ([address] Orantes's esophagus, Centrilobular emphysema ([address] Chronic diastolic CHF (congestive heart failure) ([address] Chronic hypoxemic respiratory failure ([address] GERD (gastroesophageal reflux disease), Heart disease, Hypertension, Hypotension, unspecified, Iatrogenic hypotension, Interstitial lung disease ([address] Lipids abnormal, Mixed hyperlipidemia, LIANET on CPAP, Pneumonia, Pulmonary embolus ([address] Pulmonary fibrosis ([address] Pulmonary hypertension ([address] S/P CABG x 4, and UIP (usual interstitial pneumonitis) ([address] .  Currently hospitalized for the management of CHF. The Palliative Care Team is following to assist with goals of care and support. Vital Signs  Blood pressure 110/72, pulse 69, temperature 97.6 °F (36.4 °C), temperature source Axillary, resp. rate 17, height 5' 6\" (1.676 m), weight 223 lb 13 oz (101.5 kg), last menstrual period 11/01/1985, SpO2 94 %, not currently breastfeeding.     Patient Active Problem List   Diagnosis    GERD (gastroesophageal reflux disease)    Anxiety    Lipids abnormal    Hypertension    Heart disease    Atrial fibrillation ([address]    Pulmonary fibrosis ([address]    Pneumonia    Mixed hyperlipidemia    Acute cystitis without hematuria    Interstitial lung disease ([address]    Centrilobular emphysema ([address]    Pulmonary hypertension (HCC)    Chronic diastolic CHF (congestive heart failure) (HCC)    Pulmonary embolus (HCC)    CAD (coronary artery disease), native coronary artery    Hypertensive heart disease without heart failure    Hypotension    Hypotension, unspecified    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 She remains dyspneic with talking though its improved and she is able to converse with me. She is very alert and oriented. Her son Clarence Ross and her daughter in law met with hospice today. The patient states\" we are deciding what to do, and I know that I will have to go somewhere for rehab. \" She was independent at home,and she has a great support system , and she can't get out much and they all bring her meals and get her to appointments. She does understand that hospice can also help with symptom management , and I did explain outpatient palliative care also. I encouraged her to always keep communication open with her sons, and that way she can express her wishes as time goes  . We discussed it being her life, and we are not going through what she is. He quality of life is the focus, and I offered much emotional support. She does know that she is a DNRCC-A and that they will not intubate her. She says\" I don't think that they will do CPR , and would not like my ribs fractured. \"     The patient is very appreciative of the visit, and the conversation and the support. Will follow for goals of care and support. Goals/Plan of care  Education/support to staff  Education/support to patient  Discharge planning/helping to coordinate care  Providing support for coping/adaptation/distress of patient  Continue with current plan of care  Code status clarified: [facility]  Validating patient/family distress  Patient and family met with hospice of [facility] today.  They are undecided of the care they want to pursue . Will follow for goals of care and support. Cristy Macedo .[facility]  Valerie Quigley RN  [facility]: [phone]  [address]: [phone]  Work Cell: [phone]

## 2019-04-12 NOTE — FLOWSHEET NOTE
04/12/19 0945   Encounter Summary   Services provided to: Patient   Referral/Consult From: [facility] Visiting   (4/12/19V)   Volunteer Visit Yes   Complexity of Encounter Low   Length of Encounter 15 minutes   Routine   Type Follow up   Spiritual/Evangelical   Type Spiritual support   Intervention Communion;Prayer   Sacraments   Communion Patient received communion

## 2019-04-12 NOTE — PLAN OF CARE
Problem: Falls - Risk of:  Goal: Will remain free from falls  Description  Will remain free from falls  Outcome: Met This Shift  Note:   Patient remained free from falls. Call light within reach and bed in lowest position. Patient oriented to room and surroundings. Goal: Absence of physical injury  Description  Absence of physical injury  Outcome: Met This Shift  Note:   Absence of physical injury met this shift. Problem: Risk for Impaired Skin Integrity  Goal: Tissue integrity - skin and mucous membranes  Description  Structural intactness and normal physiological function of skin and  mucous membranes. Outcome: Met This Shift     Problem: Breathing Pattern - Ineffective:  Goal: Ability to achieve and maintain a regular respiratory rate will improve  Description  Ability to achieve and maintain a regular respiratory rate will improve  Outcome: Met This Shift  Note:   Pt still on HFNC at 65%. Pt having dyspnea with exertion. Will continue to monitor breathing. Problem: Musculor/Skeletal Functional Status  Goal: Highest potential functional level  Outcome: Met This Shift  Goal: Absence of falls  Outcome: Met This Shift     Problem: Nutrition  Goal: Optimal nutrition therapy  Outcome: Met This Shift  Note:   No nausea or vomiting. Pt tolerating diet with no issues. Problem: Nutrition  Goal: Optimal nutrition therapy  Outcome: Met This Shift  Note:   No nausea or vomiting. Pt tolerating diet with no issues. Problem: Pain:  Goal: Pain level will decrease  Description  Pain level will decrease  Outcome: Met This Shift  Note:   No pain during shift. Will continue to monitor.    Goal: Control of acute pain  Description  Control of acute pain  Outcome: Met This Shift  Goal: Control of chronic pain  Description  Control of chronic pain  Outcome: Met This Shift

## 2019-04-12 NOTE — FLOWSHEET NOTE
04/12/19 1639   Encounter Summary   Services provided to: Patient   Referral/Consult From: Rounding;Palliative Care   Continue Visiting   (4/12/19)   Complexity of Encounter Moderate   Length of Encounter 15 minutes   Spiritual Assessment Completed Yes   Routine   Type Follow up   Spiritual/Buddhism   Type Spiritual support   Assessment Approachable; Anxious; Hopeful;Coping   Intervention Active listening;Sustaining presence/ Ministry of presence   Outcome Comfort;Expressed gratitude;Engaged in conversation;Coping; Hopeful;Receptive

## 2019-04-12 NOTE — PROGRESS NOTES
[facility]    OCCUPATIONAL THERAPY MISSED TREATMENT NOTE   INPATIENT   Date: 19  Patient Name: Augie Balderrama       Room:   MRN: [id number]   Account #: [de-identified]    : 1940  (78 y.o.)  Gender: female   Referring Practitioner: Dr. Rosetta Rodgers  Diagnosis: CHF             REASON FOR MISSED TREATMENT:  Patient with another ancillary department   -   Other - Respiratory Therapist currently with patient; will continue to make additional attempts this date for LBD AE and shower training.  [address], [place]

## 2019-04-12 NOTE — PROGRESS NOTES
Progress Note    Patient Name:  Dre Isidro    :  1940  2019 11:20 AM      SUBJECTIVE     No acute events overnight. No chest pain or palpitations. Shortness of breath improving. OBJECTIVE     Vital signs:    /72   Pulse 74   Temp 97.6 °F (36.4 °C) (Axillary)   Resp 23   Ht 5' 6\" (1.676 m)   Wt 223 lb 13 oz (101.5 kg)   LMP 1985   SpO2 93%   BMI 36.12 kg/m²  50 L/min      Admit Weight:  210 lb (95.3 kg)    Last 3 weights: Wt Readings from Last 3 Encounters:   19 223 lb 13 oz (101.5 kg)   19 194 lb (88 kg)   19 189 lb (85.7 kg)       BMI: Body mass index is 36.12 kg/m². Input/Output:       Intake/Output Summary (Last 24 hours) at 2019 1120  Last data filed at 2019 0800  Gross per 24 hour   Intake 240 ml   Output 1175 ml   Net -935 ml       Date 19 0000 - 19 2359   Shift 5299-2354 1865-8803 1045-6233 24 Hour Total   INTAKE   P.O.(mL/kg/hr) 120(0.1)   120   Shift Total(mL/kg) 120(1.2)   120(1.2)   OUTPUT   Urine(mL/kg/hr) 400(0.5) 100  500   Shift Total(mL/kg) 400(3.9) 100(1)  500(4.9)   Weight (kg) 101.5 101.5 101.5 101.5     Exam:     General appearance: awake and alert moves all ext   Lungs:  Decreased air entry bibasilarly  Heart: IRR, S1 and S2, 3/6 systolic ejection murmur best heard over the sternum but all over precordium  Abdomen: positive bowel sounds, no bruits, no masses  Extremities:   Bilateral lower limb edema        Laboratory Studies:     CBC:   Recent Labs     04/10/19  0433 19  0410 19  0442   WBC 5.9 6.7 4.6   HGB 13.4 13.7 13.9   HCT 40.4 42.2 43.8   MCV 91.3 90.5 91.0    193 196     BMP:   Recent Labs     04/10/19  0433 19  0410 19  0442    135 131*   K 4.5 4.9 4.8   CL 95* 93* 93*   CO2 32* 31 30   BUN 28* 29* 31*   CREATININE 1.06* 1.00* 0.88     PT/INR: No results for input(s): PROTIME, INR in the last 72 hours. APTT: No results for input(s):  APTT in the last 72 hours.  MAG: No results for input(s): MG in the last 72 hours. D Dimer: No results for input(s): DDIMER in the last 72 hours. Troponin  No results for input(s): TROPONINI in the last 72 hours. No results for input(s): TROPONINT in the last 72 hours. BNP No results for input(s): BNP in the last 72 hours. Recent Labs     19  0410   PROBNP 9,731*         Pulse Ox: SpO2  Av.8 %  Min: 87 %  Max: 95 %  Supplemental O2: O2 Flow Rate (L/min): 50 L/min     Current Meds:    ipratropium-albuterol  1 ampule Inhalation 4x daily    methylPREDNISolone  60 mg Intravenous Q8H    potassium chloride  20 mEq Oral BID WC    furosemide  20 mg Intravenous BID    apixaban  2.5 mg Oral BID    aspirin  81 mg Oral Daily    atorvastatin  80 mg Oral Daily    calcium carbonate-vitamin D  1 tablet Oral Daily    citalopram  20 mg Oral Daily    metoprolol succinate  100 mg Oral Daily    pantoprazole  40 mg Oral QAM AC    sodium chloride flush  10 mL Intravenous 2 times per day     Continuous Infusions:          ASSESSMENT     Principal Problem:    Acute on chronic diastolic CHF (congestive heart failure) (HCC)  Active Problems:    GERD (gastroesophageal reflux disease)    Anxiety    Hypertension    Atrial fibrillation (HCC)    Pulmonary fibrosis (HCC)    Mixed hyperlipidemia    Acute cystitis without hematuria    Interstitial lung disease (HCC)    Centrilobular emphysema (HCC)    Pulmonary hypertension (HCC)    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 *    Acute on chronic Right and left heart failure with chronic cor pulmonale. LVEF 45-50%.     Severe tricuspid regurgitation, severe pulmonary hypertension with RVSP 70-75 mm Hg, severely dilated right 23, moderate to severe RV systolic dysfunction.     Chronic atrial fibrillation.   On chronic oral

## 2019-04-12 NOTE — PROGRESS NOTES
FAMILY MEDICINE  - PROGRESS NOTE    Date:  4/12/2019  Kodi Flores  [id number]      Chief Complaint   Patient presents with    Shortness of Breath         Interval History:  Improved, she feels good but she is still on high flow O2.       Subjective  Respiratory: positive for emphysema and shortness of breath  Cardiovascular: positive for irregular heart beat  Musculoskeletal:positive for arthralgias, myalgias and stiff joints  Behavioral/Psych: positive for anxiety and obesity:    Objective:    BP (!) 127/101   Pulse 86   Temp 97.6 °F (36.4 °C) (Axillary)   Resp 20   Ht 5' 6\" (1.676 m)   Wt 223 lb 13 oz (101.5 kg)   LMP 11/01/1985   SpO2 (!) 89%   BMI 36.12 kg/m²   General appearance - alert, well appearing, and in no distress and overweight  Mental status - alert, oriented to person, place, and time  Eyes - pupils equal and reactive, extraocular eye movements intact  Ears - hearing grossly normal bilaterally  Nose - normal and patent, no erythema, discharge or polyps  Mouth - mucous membranes moist, pharynx normal without lesions  Neck - supple, no significant adenopathy  Lymphatics - no palpable lymphadenopathy, no hepatosplenomegaly  Chest - decreased air entry noted posteriorly  Heart - irregularly irregular rhythm with rate 86  Abdomen - soft, nontender, nondistended, no masses or organomegaly  Breasts - not examined  Back exam - full range of motion, no tenderness, palpable spasm or pain on motion  Neurological - alert, oriented, normal speech, no focal findings or movement disorder noted  Musculoskeletal - osteoarthritic changes noted in both hands  Extremities - pedal edema trace +  Skin - normal coloration and turgor, no rashes, no suspicious skin lesions noted    Data:   Medications:   Current Facility-Administered Medications   Medication Dose Route Frequency Provider Last Rate Last Dose    ipratropium-albuterol (DUONEB) nebulizer solution 1 ampule  1 ampule Inhalation 4x daily Ml Olivares MD   1 ampule at 04/12/19 0747    methylPREDNISolone sodium (SOLU-MEDROL) injection 60 mg  60 mg Intravenous Q8H Priyanka Gamble MD   60 mg at 04/12/19 0554    potassium chloride (KLOR-CON M) extended release tablet 20 mEq  20 mEq Oral BID WC Konstantin Phipps MD   20 mEq at 04/11/19 1707    ciprofloxacin (CIPRO) tablet 250 mg  250 mg Oral 2 times per day Hansa Ramirez MD   250 mg at 04/11/19 2120    furosemide (LASIX) injection 20 mg  20 mg Intravenous BID Konstantin Phipps MD   20 mg at 04/11/19 1707    apixaban (ELIQUIS) tablet 2.5 mg  2.5 mg Oral BID Jaswant Calderon MD   2.5 mg at 04/11/19 2120    aspirin EC tablet 81 mg  81 mg Oral Daily Jaswant Calderon MD   81 mg at 04/11/19 0957    atorvastatin (LIPITOR) tablet 80 mg  80 mg Oral Daily Jaswant Calderon MD   80 mg at 04/11/19 2121    calcium carbonate-vitamin D (CALTRATE) 600-400 MG-UNIT per tab 1 tablet  1 tablet Oral Daily Jaswant Calderon MD   1 tablet at 04/11/19 0956    citalopram (CELEXA) tablet 20 mg  20 mg Oral Daily Jaswant Calderon MD   20 mg at 04/11/19 0956    LORazepam (ATIVAN) tablet 0.5 mg  0.5 mg Oral Q8H PRN Jaswant Calderon MD   0.5 mg at 04/10/19 2155    metoprolol succinate (TOPROL XL) extended release tablet 100 mg  100 mg Oral Daily Jaswant Calderon MD   100 mg at 04/11/19 0957    pantoprazole (PROTONIX) tablet 40 mg  40 mg Oral QAM AC Jaswant Calderon MD   40 mg at 04/11/19 0956    sodium chloride flush 0.9 % injection 10 mL  10 mL Intravenous 2 times per day Jaswant Calderon MD   10 mL at 04/11/19 2220    sodium chloride flush 0.9 % injection 10 mL  10 mL Intravenous PRN Jaswant Calderon MD   10 mL at 04/11/19 1709    acetaminophen (TYLENOL) tablet 650 mg  650 mg Oral Q4H PRN Jaswant Calderon MD        morphine (PF) injection 2 mg  2 mg Intravenous Q2H PRN Jaswant Calderon MD   2 mg at 04/11/19 7687    Or    morphine sulfate (PF) injection 4 mg  4 mg Intravenous Q2H PRN Jaswant Calderon MD        ondansetron [facility] injection 4 mg  4 mg Intravenous Q8H PRN Jaswant Calderon MD           Intake/Output Summary (Last 24 hours) at 4/12/2019 0759  Last data filed at 4/12/2019 0400  Gross per 24 hour   Intake 240 ml   Output 1075 ml   Net -835 ml     Recent Results (from the past 24 hour(s))   Basic Metabolic Prof    Collection Time: 04/12/19  4:42 AM   Result Value Ref Range    Glucose 158 (H) 70 - 99 mg/dL    BUN 31 (H) 8 - 23 mg/dL    CREATININE 0.88 0.50 - 0.90 mg/dL    Bun/Cre Ratio NOT REPORTED 9 - 20    Calcium 9.2 8.6 - 10.4 mg/dL    Sodium 131 (L) 135 - 144 mmol/L    Potassium 4.8 3.7 - 5.3 mmol/L    Chloride 93 (L) 98 - 107 mmol/L    CO2 30 20 - 31 mmol/L    Anion Gap 8 (L) 9 - 17 mmol/L    GFR Non-African American >60 >60 mL/min    GFR African American >60 >60 mL/min    GFR Comment          GFR Staging NOT REPORTED    CBC with DIFF    Collection Time: 04/12/19  4:42 AM   Result Value Ref Range    WBC 4.6 3.5 - 11.0 k/uL    RBC 4.81 4.0 - 5.2 m/uL    Hemoglobin 13.9 12.0 - 16.0 g/dL    Hematocrit 43.8 36 - 46 %    MCV 91.0 80 - 100 fL    MCH 29.0 26 - 34 pg    MCHC 31.8 31 - 37 g/dL    RDW 20.4 (H) 11.5 - 14.9 %    Platelets 552 548 - 580 k/uL    MPV 9.5 6.0 - 12.0 fL    NRBC Automated NOT REPORTED per 100 WBC    Differential Type NOT REPORTED     Immature Granulocytes NOT REPORTED 0 %    Absolute Immature Granulocyte NOT REPORTED 0.00 - 0.30 k/uL    WBC Morphology NOT REPORTED     RBC Morphology NOT REPORTED     Platelet Estimate NOT REPORTED     Seg Neutrophils 97 (H) 36 - 66 %    Lymphocytes 3 (L) 24 - 44 %    Monocytes 0 (L) 1 - 7 %    Eosinophils % 0 0 - 4 %    Basophils 0 0 - 2 %    Segs Absolute 4.46 1.3 - 9.1 k/uL    Absolute Lymph # 0.14 (L) 1.0 - 4.8 k/uL    Absolute Mono # 0.00 (L) 0.1 - 1.3 k/uL    Absolute Eos # 0.00 0.0 - 0.4 k/uL    Basophils # 0.00 0.0 - 0.2 k/uL    Morphology Normal -----------------------------------------------------------------  RAD:  EKG:  Micro:     Assessment & Plan:    Patient Active Problem List:     GERD (gastroesophageal reflux disease)     Anxiety     Lipids abnormal     Hypertension     Heart disease     Atrial fibrillation (HCC)     Pulmonary fibrosis (HCC)     Pneumonia     Mixed hyperlipidemia     Acute cystitis without hematuria     Interstitial lung disease (HCC)     Centrilobular emphysema (HCC)     Pulmonary hypertension (HCC)     Chronic diastolic CHF (congestive heart failure) (HCC)     Pulmonary embolus (HCC)     CAD (coronary artery disease), native coronary artery     Hypertensive heart disease without heart failure     Hypotension     Hypotension, unspecified     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 CHF, A. Fib - stable, Cardiology on board. Continue current treatments. Complete orders per chart.     See orders   Disposition:    Electronically signed by Izzy Pozo MD on 4/12/2019 at 7:59 AM

## 2019-04-12 NOTE — PROGRESS NOTES
MODE NOT REPORTED 01/24/2019         INR  No results for input(s): PROTIME, INR in the last 72 hours. APTT  No results for input(s): APTT in the last 72 hours. Lactic Acid  No results found for: LACTA     BNP   No results for input(s): BNP in the last 72 hours. Cultures       Radiology           SYSTEMS ASSESSMENT    Acute on chronic hypoxic respiratory failure  Acute on chronic CHF  History of atrial fibrillation  LIANET        Neuro   No further confusion overnight    Respiratory   Wean oxygen as tolerated. Keep O2 sat > 88%  Will wean IV Solu-Medrol  Continue her BiPAP overnight if she wishes    Cardiovascular   Continue keep her on the dry side  Hopefully transition to oral Lasix next 2448 hrs. Gastrointestinal   Diet is poor because of her dyspnea    Renal   She is prerenal, but that is helping her oxygenation. Also, some elevation in BUN is due to steroids    Infectious Disease       Hematology/Oncology   Continue Eliquis    Endocrine       Social/Spiritual/DNR/Disposition/Other     Hospice meeting noted, they agreeable to inpatient hospice with transition to home hospice care.   However, they want to be \"medically optimized\" before discharge, which is not unreasonable  Continue current level of care  Ford Mota was here at bedside and prognosis and plans explained to him    Critical Care Time   0 min    Electronically signed by Jessica Scott MD on 4/12/2019 at 1:03 PM

## 2019-04-12 NOTE — CARE COORDINATION
ONGOING DISCHARGE PLAN:    Spoke with patient regarding discharge plan and patient confirms that plan is still undecided at this time. Pt. States, She did meet w/ [facility] today w/ her Colt Jacobson. Pt states, she also spoke w/ Dr. Nadeem Nevarez today, & they discussed, Possible DC on Sunday, Go to Inpatient Hospice for Rehab for a few weeks & then Home w/ [facility]. She states, she was informed that [address] [place] [address] can take her on her [place] oxygen. Pt. Was made a [facility] today. She also states, \" she is so overwelmed & tired & is still unsure of needs at this time, & that her Colt Real, has been very involved w/ the decision making. \". Writer informed pt. That we are here to assist w/ her needs & that when she & her family make the final decision, to let staff know & we can follow. Pt very appreciative of this & will let us know plans when they are decided. Will continue to follow for additional discharge needs.     Electronically signed by Amanda Antonio RN on 4/12/2019 at 4:17 PM

## 2019-04-12 NOTE — DISCHARGE INSTR - COC
 Lipids abnormal E78.89    Hypertension I10    Heart disease I51.9    Atrial fibrillation (HCC) I48.91    Pulmonary fibrosis (HCC) J84.10    Pneumonia J18.9    Mixed hyperlipidemia E78.2    Acute cystitis without hematuria N30.00    Interstitial lung disease (HCC) J84.9    Centrilobular emphysema (HCC) J43.2    Pulmonary hypertension (HCC) I27.20    Chronic diastolic CHF (congestive heart failure) ([facility]) I50.32    Pulmonary embolus ([facility]) I26.99    CAD (coronary artery disease), native coronary artery I25.10    Hypertensive heart disease without heart failure I11.9    Hypotension I95.9    Hypotension, unspecified I95.9    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    Iatrogenic hypotension I95.89    Lung nodule, solitary R91.1    S/P CABG x 4 Z95.1    Acute on chronic respiratory failure with hypoxemia ([facility]) J96.21    Hypoxia R09.02    Moderate malnutrition ([facility]) E44.0    CHF, left ventricular ([facility]) I50.9    Acute on chronic diastolic CHF (congestive heart failure) ([facility]) I50.33       Isolation/Infection:   Isolation          No Isolation            Nurse Assessment:  Last Vital Signs: BP (!) 127/101   Pulse 86   Temp 97.6 °F (36.4 °C) (Axillary)   Resp 20   Ht 5' 6\" (1.676 m)   Wt 223 lb 13 oz (101.5 kg)   LMP 11/01/1985   SpO2 (!) 89%   BMI 36.12 kg/m²     Last documented pain score (0-10 scale): Pain Level: 0  Last Weight:   Wt Readings from Last 1 Encounters:   04/11/19 223 lb 13 oz (101.5 kg)     Mental Status:  oriented and alert    IV Access:  - None    Nursing Mobility/ADLs:  Walking   Independent  Transfer  Independent  Bathing  Independent  Dressing  Independent  Toileting  Assisted  Feeding  Independent  Med Admin  Independent  Med Delivery   whole    Wound Care Documentation and Therapy:        Elimination:  Continence:   · Bowel:  Yes  · Bladder: Yes  Urinary Catheter: None   Colostomy/Ileostomy/Ileal Conduit: No       Date of Last BM: 04/13/19    Intake/Output Summary (Last 24 hours) at 4/12/2019 0841  Last data filed at 4/12/2019 0400  Gross per 24 hour   Intake 240 ml   Output 1075 ml   Net -835 ml     I/O last 3 completed shifts: In: 240 [P.O.:240]  Out: 1075 [Urine:1075]    Safety Concerns: At Risk for Falls    Impairments/Disabilities:      None    Nutrition Therapy:  Current Nutrition Therapy:   - Oral Diet:  Cardiac    Routes of Feeding: Oral  Liquids: No Restrictions  Daily Fluid Restriction: yes - amount 1500  Last Modified Barium Swallow with Video (Video Swallowing Test): not done    Treatments at the Time of Hospital Discharge:   Respiratory Treatments: Q4H while awake, high flow oxygen 50L. Oxygen Therapy:  is on oxygen at heated high flow 50 L/min per nasal cannula. Ventilator:    - BiPAP   IPAP: 12 cmH20, EPAP: 6 cmH2O only when sleeping    Rehab Therapies: Physical Therapy and Occupational Therapy  Weight Bearing Status/Restrictions: No weight bearing restirctions  Other Medical Equipment (for information only, NOT a DME order):  walker  Other Treatments: Skilled Nursing Assessment Per Protocol. Medication Education & Monitoring. Please make patient an appointment with their PCP within 7 days of discharge from your facility. Please refer patient to [facility]  when discharged from your facility for home health services. Home health care agency's  to evaluate patient two weeks prior to discharge from home health to determine post-discharge services. Patient's personal belongings (please select all that are sent with patient):  None    RN SIGNATURE:  Electronically signed by Belkys Hernadez RN on 4/13/19 at 5:50 PM    CASE MANAGEMENT/SOCIAL WORK SECTION    Inpatient Status Date: 4/4/19    Readmission Risk Assessment Score:  Readmission Risk              Risk of Unplanned Readmission:        17           Discharging to Facility/ [address]    Phone: [phone]  · Fax: [phone]    Dialysis

## 2019-04-13 LAB
ABSOLUTE EOS #: 0 K/UL (ref 0–0.4)
ABSOLUTE IMMATURE GRANULOCYTE: ABNORMAL K/UL (ref 0–0.3)
ABSOLUTE LYMPH #: 0.56 K/UL (ref 1–4.8)
ABSOLUTE MONO #: 0.34 K/UL (ref 0.1–1.3)
ANION GAP SERPL CALCULATED.3IONS-SCNC: 8 MMOL/L (ref 9–17)
BASOPHILS # BLD: 0 % (ref 0–2)
BASOPHILS ABSOLUTE: 0 K/UL (ref 0–0.2)
BUN BLDV-MCNC: 39 MG/DL (ref 8–23)
BUN/CREAT BLD: ABNORMAL (ref 9–20)
CALCIUM SERPL-MCNC: 9.1 MG/DL (ref 8.6–10.4)
CHLORIDE BLD-SCNC: 95 MMOL/L (ref 98–107)
CO2: 31 MMOL/L (ref 20–31)
CREAT SERPL-MCNC: 0.76 MG/DL (ref 0.5–0.9)
DIFFERENTIAL TYPE: ABNORMAL
EOSINOPHILS RELATIVE PERCENT: 0 % (ref 0–4)
GFR AFRICAN AMERICAN: >60 ML/MIN
GFR NON-AFRICAN AMERICAN: >60 ML/MIN
GFR SERPL CREATININE-BSD FRML MDRD: ABNORMAL ML/MIN/{1.73_M2}
GFR SERPL CREATININE-BSD FRML MDRD: ABNORMAL ML/MIN/{1.73_M2}
GLUCOSE BLD-MCNC: 154 MG/DL (ref 70–99)
HCT VFR BLD CALC: 41.7 % (ref 36–46)
HEMOGLOBIN: 13.5 G/DL (ref 12–16)
IMMATURE GRANULOCYTES: ABNORMAL %
LYMPHOCYTES # BLD: 5 % (ref 24–44)
MCH RBC QN AUTO: 29.7 PG (ref 26–34)
MCHC RBC AUTO-ENTMCNC: 32.5 G/DL (ref 31–37)
MCV RBC AUTO: 91.4 FL (ref 80–100)
MONOCYTES # BLD: 3 % (ref 1–7)
MORPHOLOGY: ABNORMAL
MORPHOLOGY: ABNORMAL
NRBC AUTOMATED: ABNORMAL PER 100 WBC
PDW BLD-RTO: 20.3 % (ref 11.5–14.9)
PLATELET # BLD: 215 K/UL (ref 150–450)
PLATELET ESTIMATE: ABNORMAL
PMV BLD AUTO: 9.7 FL (ref 6–12)
POTASSIUM SERPL-SCNC: 5 MMOL/L (ref 3.7–5.3)
RBC # BLD: 4.56 M/UL (ref 4–5.2)
RBC # BLD: ABNORMAL 10*6/UL
SEG NEUTROPHILS: 92 % (ref 36–66)
SEGMENTED NEUTROPHILS ABSOLUTE COUNT: 10.3 K/UL (ref 1.3–9.1)
SODIUM BLD-SCNC: 134 MMOL/L (ref 135–144)
WBC # BLD: 11.2 K/UL (ref 3.5–11)
WBC # BLD: ABNORMAL 10*3/UL

## 2019-04-13 PROCEDURE — 6370000000 HC RX 637 (ALT 250 FOR IP): Performed by: INTERNAL MEDICINE

## 2019-04-13 PROCEDURE — 6360000002 HC RX W HCPCS: Performed by: INTERNAL MEDICINE

## 2019-04-13 PROCEDURE — 94762 N-INVAS EAR/PLS OXIMTRY CONT: CPT

## 2019-04-13 PROCEDURE — 99231 SBSQ HOSP IP/OBS SF/LOW 25: CPT | Performed by: FAMILY MEDICINE

## 2019-04-13 PROCEDURE — 94640 AIRWAY INHALATION TREATMENT: CPT

## 2019-04-13 PROCEDURE — 6370000000 HC RX 637 (ALT 250 FOR IP): Performed by: STUDENT IN AN ORGANIZED HEALTH CARE EDUCATION/TRAINING PROGRAM

## 2019-04-13 PROCEDURE — 2060000000 HC ICU INTERMEDIATE R&B

## 2019-04-13 PROCEDURE — 2580000003 HC RX 258: Performed by: STUDENT IN AN ORGANIZED HEALTH CARE EDUCATION/TRAINING PROGRAM

## 2019-04-13 PROCEDURE — 94660 CPAP INITIATION&MGMT: CPT

## 2019-04-13 PROCEDURE — 85025 COMPLETE CBC W/AUTO DIFF WBC: CPT

## 2019-04-13 PROCEDURE — 2700000000 HC OXYGEN THERAPY PER DAY

## 2019-04-13 PROCEDURE — 36415 COLL VENOUS BLD VENIPUNCTURE: CPT

## 2019-04-13 PROCEDURE — 80048 BASIC METABOLIC PNL TOTAL CA: CPT

## 2019-04-13 RX ORDER — FUROSEMIDE 10 MG/ML
40 INJECTION INTRAMUSCULAR; INTRAVENOUS 2 TIMES DAILY
Status: DISCONTINUED | OUTPATIENT
Start: 2019-04-13 | End: 2019-04-19 | Stop reason: HOSPADM

## 2019-04-13 RX ORDER — METHYLPREDNISOLONE SODIUM SUCCINATE 40 MG/ML
40 INJECTION, POWDER, LYOPHILIZED, FOR SOLUTION INTRAMUSCULAR; INTRAVENOUS EVERY 12 HOURS
Status: DISCONTINUED | OUTPATIENT
Start: 2019-04-13 | End: 2019-04-14

## 2019-04-13 RX ADMIN — LORAZEPAM 0.5 MG: 0.5 TABLET ORAL at 08:46

## 2019-04-13 RX ADMIN — IPRATROPIUM BROMIDE AND ALBUTEROL SULFATE 1 AMPULE: .5; 3 SOLUTION RESPIRATORY (INHALATION) at 20:59

## 2019-04-13 RX ADMIN — APIXABAN 2.5 MG: 2.5 TABLET, FILM COATED ORAL at 20:45

## 2019-04-13 RX ADMIN — ATORVASTATIN CALCIUM 80 MG: 80 TABLET, FILM COATED ORAL at 20:45

## 2019-04-13 RX ADMIN — CITALOPRAM HYDROBROMIDE 20 MG: 20 TABLET ORAL at 08:46

## 2019-04-13 RX ADMIN — IPRATROPIUM BROMIDE AND ALBUTEROL SULFATE 1 AMPULE: .5; 3 SOLUTION RESPIRATORY (INHALATION) at 10:53

## 2019-04-13 RX ADMIN — FUROSEMIDE 40 MG: 10 INJECTION, SOLUTION INTRAMUSCULAR; INTRAVENOUS at 17:23

## 2019-04-13 RX ADMIN — Medication 1 TABLET: at 08:46

## 2019-04-13 RX ADMIN — METOPROLOL SUCCINATE 100 MG: 100 TABLET, EXTENDED RELEASE ORAL at 08:46

## 2019-04-13 RX ADMIN — METHYLPREDNISOLONE SODIUM SUCCINATE 60 MG: 125 INJECTION, POWDER, FOR SOLUTION INTRAMUSCULAR; INTRAVENOUS at 03:16

## 2019-04-13 RX ADMIN — IPRATROPIUM BROMIDE AND ALBUTEROL SULFATE 1 AMPULE: .5; 3 SOLUTION RESPIRATORY (INHALATION) at 16:30

## 2019-04-13 RX ADMIN — Medication 10 ML: at 21:00

## 2019-04-13 RX ADMIN — Medication 10 ML: at 08:49

## 2019-04-13 RX ADMIN — APIXABAN 2.5 MG: 2.5 TABLET, FILM COATED ORAL at 08:47

## 2019-04-13 RX ADMIN — FUROSEMIDE 40 MG: 10 INJECTION, SOLUTION INTRAMUSCULAR; INTRAVENOUS at 08:46

## 2019-04-13 RX ADMIN — ASPIRIN 81 MG: 81 TABLET, COATED ORAL at 08:47

## 2019-04-13 RX ADMIN — METHYLPREDNISOLONE SODIUM SUCCINATE 40 MG: 40 INJECTION, POWDER, LYOPHILIZED, FOR SOLUTION INTRAMUSCULAR; INTRAVENOUS at 15:14

## 2019-04-13 RX ADMIN — PANTOPRAZOLE SODIUM 40 MG: 40 TABLET, DELAYED RELEASE ORAL at 03:14

## 2019-04-13 ASSESSMENT — PAIN SCALES - GENERAL: PAINLEVEL_OUTOF10: 0

## 2019-04-13 NOTE — PROGRESS NOTES
ICU Progress Note (Non-Vent)  [facility]    Patient - Lucio Mccord,  Age - 78 y.o.    - 1940      Room Number -    N -  [id number]   Acct # - [de-identified]  Date of Admission -  2019  4:16 PM    Events of Past 24 Hours   Uneventful night, says she slept well    Vitals    height is 5' 6\" (1.676 m) and weight is 229 lb 4.5 oz (104 kg). Her axillary temperature is 96.9 °F (36.1 °C). Her blood pressure is 109/66 and her pulse is 75. Her respiration is 13 and oxygen saturation is 93%.        Temperature Range: Temp: 96.9 °F (36.1 °C) Temp  Av.5 °F (36.4 °C)  Min: 96.9 °F (36.1 °C)  Max: 97.8 °F (36.6 °C)  BP Range:  Systolic (24IZM), KQZ:293 , Min:101 , DYE:925     Diastolic (80LKZ), ZBS:23, Min:55, Max:105    Pulse Range: Pulse  Av.3  Min: 64  Max: 87  Respiration Range: Resp  Av  Min: 13  Max: 27  Current Pulse Ox[de-identified]  SpO2: 93 %  24HR Pulse Ox Range:  SpO2  Av.4 %  Min: 82 %  Max: 94 %  Oxygen Amount and Delivery: O2 Flow Rate (L/min): 50 L/min    Wt Readings from Last 3 Encounters:   19 229 lb 4.5 oz (104 kg)   19 194 lb (88 kg)   19 189 lb (85.7 kg)     I/O       Intake/Output Summary (Last 24 hours) at 2019 0849  Last data filed at 2019 0515  Gross per 24 hour   Intake 360 ml   Output 1000 ml   Net -640 ml     DRAIN/TUBE OUTPUT       Invasive Lines   ICP PRESSURE RANGE  No data recorded  CVP PRESSURE RANGE  No data recorded      Medications      furosemide  40 mg Intravenous BID    ipratropium-albuterol  1 ampule Inhalation 4x daily    methylPREDNISolone  60 mg Intravenous Q8H    potassium chloride  20 mEq Oral BID WC    apixaban  2.5 mg Oral BID    aspirin  81 mg Oral Daily    atorvastatin  80 mg Oral Daily    calcium carbonate-vitamin D  1 tablet Oral Daily    citalopram  20 mg Oral Daily    metoprolol succinate  100 mg Oral Daily    pantoprazole  40 mg Oral QAM AC    sodium chloride flush  10 mL Intravenous 2 times per day     LORazepam, sodium chloride flush, acetaminophen, morphine **OR** morphine, ondansetron  IV Drips/Infusions      Diet/Nutrition   DIET CARDIAC; Daily Fluid Restriction: 1500 ml    Exam      Constitutional - Alert, arousable  General Appearance  well developed, well nourished  HEENT -normocephalic, atraumatic. PERRLA  Lungs - Chest expands equally, no wheezes, bibasilar crackles, slightly improved Cardiovascular - Heart sounds are normal.  normal rate and rhythm regular, no murmur, gallop or rub. Abdomen - soft, nontender, nondistended, no masses or organomegaly  Neurologic - CN II-XII are grossly intact.  There are no focal motor deficits  Skin - no bruising or bleeding  Extremities - no cyanosis, clubbing, +edema    Lab Results   CBC     Lab Results   Component Value Date    WBC 11.2 04/13/2019    RBC 4.56 04/13/2019    RBC 4.53 10/18/2011    HGB 13.5 04/13/2019    HCT 41.7 04/13/2019     04/13/2019     10/18/2011    MCV 91.4 04/13/2019    MCH 29.7 04/13/2019    MCHC 32.5 04/13/2019    RDW 20.3 04/13/2019    LYMPHOPCT 5 04/13/2019    MONOPCT 3 04/13/2019    BASOPCT 0 04/13/2019    MONOSABS 0.34 04/13/2019    LYMPHSABS 0.56 04/13/2019    EOSABS 0.00 04/13/2019    BASOSABS 0.00 04/13/2019    DIFFTYPE NOT REPORTED 04/13/2019       BMP   Lab Results   Component Value Date     04/13/2019    K 5.0 04/13/2019    CL 95 04/13/2019    CO2 31 04/13/2019    BUN 39 04/13/2019    CREATININE 0.76 04/13/2019    GLUCOSE 154 04/13/2019       LFTS  Lab Results   Component Value Date    ALKPHOS 189 01/30/2019    ALT 20 01/30/2019    AST 37 01/30/2019    PROT 7.2 01/30/2019    BILITOT 1.70 01/30/2019    BILIDIR 0.08 09/12/2017    IBILI 0.12 09/12/2017    LABALBU 3.2 01/30/2019       ABG ABGs:   Lab Results   Component Value Date    PHART 7.404 01/24/2019    PO2ART 64.1 01/24/2019    UJY8MHC 46.1 01/24/2019       Lab

## 2019-04-13 NOTE — FLOWSHEET NOTE
Patient talked about her wishes to be able to go back home; patient discussed \"going to hospice for awhile and then going home\"; patient comforted by prayer; listening presence and support;     04/13/19 1146   Encounter Summary   Services provided to: Patient   Referral/Consult From: Palliative Care   Continue Visiting   (4/13/19)   Complexity of Encounter Moderate   Length of Encounter 15 minutes   Spiritual Assessment Completed Yes   Routine   Type Follow up   Spiritual/Jewish   Type Spiritual support   Assessment Approachable; Hopeful;Coping   Intervention Active listening;Prayer;Sustaining presence/ Ministry of presence; Discussed illness/injury and it's impact   Outcome Comfort;Expressed gratitude;Engaged in conversation;Expressed feelings/needs/concerns;Coping; Hopeful;Receptive

## 2019-04-13 NOTE — PROGRESS NOTES
Progress Note    Patient Name:  Kishan Lawton    :  1940  2019 10:02 AM      SUBJECTIVE       Ms. Lloyd Lozatles that she is feeling better. She feels if she is breathing more easily. She has no chest pain complaints. OBJECTIVE     Vital signs:    /66   Pulse 75   Temp 96.9 °F (36.1 °C) (Axillary)   Resp 13   Ht 5' 6\" (1.676 m)   Wt 229 lb 4.5 oz (104 kg)   LMP 1985   SpO2 93%   BMI 37.01 kg/m²  50 L/min  .tro    Admit Weight:  210 lb (95.3 kg)    Last 3 weights: Wt Readings from Last 3 Encounters:   19 229 lb 4.5 oz (104 kg)   19 194 lb (88 kg)   19 189 lb (85.7 kg)       BMI: Body mass index is 37.01 kg/m². Input/Output:       Intake/Output Summary (Last 24 hours) at 2019 1002  Last data filed at 2019 0930  Gross per 24 hour   Intake 560 ml   Output 1000 ml   Net -440 ml       Date 19 0000 - 19 2359   Shift 1723-5900 2326-7397 9261-3138 24 Hour Total   INTAKE   P.O.(mL/kg/hr) 360(0.4) 200  560   Shift Total(mL/kg) 360(3.5) 200(1.9)  560(5.4)   OUTPUT   Urine(mL/kg/hr) 500(0.6)   500   Shift Total(mL/kg) 500(4.8)   500(4.8)   Weight (kg) 104 104 104 104     Exam:     General appearance: awake and alert moves all ext   Lungs:   Distant sounds with few rales noted. Heart: S1 and S2 no murmur  Extremities: warm and dry, no cyanosis, no clubbing        Laboratory Studies:     CBC:   Recent Labs     19  0410 19  0442 19  0415   WBC 6.7 4.6 11.2*   HGB 13.7 13.9 13.5   HCT 42.2 43.8 41.7   MCV 90.5 91.0 91.4    196 215     BMP:   Recent Labs     19  0410 19  0442 19  0415    131* 134*   K 4.9 4.8 5.0   CL 93* 93* 95*   CO2 31 30 31   BUN 29* 31* 39*   CREATININE 1.00* 0.88 0.76     PT/INR: No results for input(s): PROTIME, INR in the last 72 hours. APTT: No results for input(s): APTT in the last 72 hours. MAG: No results for input(s): MG in the last 72 hours.   D Dimer: No results for input(s): DDIMER in the last 72 hours. Troponin  Invalid input(s): TROP      troponins  BNP No results for input(s): BNP in the last 72 hours. Recent Labs     19  0410   PROBNP 9,731*         Pulse Ox: SpO2  Av.4 %  Min: 82 %  Max: 94 %  Supplemental O2: O2 Flow Rate (L/min): 50 L/min     Current Meds:    furosemide  40 mg Intravenous BID    methylPREDNISolone  40 mg Intravenous Q12H    ipratropium-albuterol  1 ampule Inhalation 4x daily    potassium chloride  20 mEq Oral BID WC    apixaban  2.5 mg Oral BID    aspirin  81 mg Oral Daily    atorvastatin  80 mg Oral Daily    calcium carbonate-vitamin D  1 tablet Oral Daily    citalopram  20 mg Oral Daily    metoprolol succinate  100 mg Oral Daily    pantoprazole  40 mg Oral QAM AC    sodium chloride flush  10 mL Intravenous 2 times per day     Continuous Infusions:          ASSESSMENT     Principal Problem:    Acute on chronic diastolic CHF (congestive heart failure) (HCC)  Active Problems:    GERD (gastroesophageal reflux disease)    Anxiety    Hypertension    Atrial fibrillation (HCC)    Pulmonary fibrosis (HCC)    Mixed hyperlipidemia    Acute cystitis without hematuria    Interstitial lung disease (HCC)    Centrilobular emphysema (HCC)    Pulmonary hypertension (HCC)    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 *     1. Acute on chronic right and left congestive heart failure with cor pulmonale. Ejection fraction noted to be 45-50%     2. Severe tricuspid regurgitation, severe pulmonary hypertension and at least moderate right ventricular systolic dysfunction  Likely secondary to overwhelming pulmonary issues    3. Longstanding persistent atrial fibrillation; rate reasonable    4.  Coronary disease with history of bypass grafting on 2 separate occasions do not feel ongoing ischemic issues major problem    5. Chronic shortness of breath at rest with pulmonary fibrosis    Discussed with Dr. Marc Bradley. I do believe more aggressive diuresis might be worthwhile looking had to patient going home. I believe we get her fluid down  May help with her overall state. We can try 40 mg intravenously twice a day and switch to oral in the morning. We will see how she responds. Follow BUN and creatinine.     PLAN           Electronically signed by Keesha Zamudio MD on 4/13/2019 at 10:02 AM

## 2019-04-13 NOTE — CARE COORDINATION
DISCHARGE PLANNING NOTE:    Plan is for this patient to either return to home with [facility] care or go inpatient Hospice of [address] [place] [address]. Remains on High flow O2 and Bipap PRN. On steroids 40Q12 and lasix 40 BID. Will continue to follow. Bruce Header - 17%.      Electronically signed by Rose Zepeda RN on 4/13/2019 at 5:05 PM

## 2019-04-13 NOTE — FLOWSHEET NOTE
04/13/19 1220   Encounter Summary   Services provided to: Patient   Referral/Consult From: [facility]   (4/13/19V)   Volunteer Visit Yes   Complexity of Encounter Low   Length of Encounter 15 minutes   Routine   Type Follow up   Spiritual/Jain   Type Spiritual support   Intervention Communion;Prayer   Sacraments   Communion Patient received communion

## 2019-04-13 NOTE — PLAN OF CARE
Problem: Falls - Risk of:  Goal: Will remain free from falls  Description  Will remain free from falls  4/13/2019 0218 by Kei Mcmahon RN  Outcome: Ongoing  Note:   Patient alert and oriented. Using call light appropriately. Bed alarm on at HS. Able to transfer from chair to bed with minimal assistance. 4/12/2019 1932 by Tr Patel RN  Outcome: Met This Shift  Note:   Patient remained free from falls. Call light within reach and bed in lowest position. Patient oriented to room and surroundings. Goal: Absence of physical injury  Description  Absence of physical injury  4/13/2019 0218 by Kei Mcmahon RN  Outcome: Ongoing  4/12/2019 1932 by Tr Patel RN  Outcome: Met This Shift  Note:   Absence of physical injury met this shift. Problem: Risk for Impaired Skin Integrity  Goal: Tissue integrity - skin and mucous membranes  Description  Structural intactness and normal physiological function of skin and  mucous membranes. 4/13/2019 0218 by Kei Mcmahon RN  Outcome: Ongoing  Note:   Dina area and buttocks reddened. Pure wick in place. Skin kept clean and dry. Able to move self in bed.  4/12/2019 1932 by Tr Patel RN  Outcome: Met This Shift     Problem: Breathing Pattern - Ineffective:  Goal: Ability to achieve and maintain a regular respiratory rate will improve  Description  Ability to achieve and maintain a regular respiratory rate will improve  4/13/2019 0218 by Kei Mcmahon RN  Outcome: Ongoing  Note:   Remains on high flow oxygen. Short of breath and desats with movement. Declined bipap at HS. Sats remain above 88%. Occasional congested nonproductive cough. 4/12/2019 1932 by Tr Patel RN  Outcome: Met This Shift  Note:   Pt still on HFNC at 65%. Pt having dyspnea with exertion. Will continue to monitor breathing.       Problem: Musculor/Skeletal Functional Status  Goal: Highest potential functional level  4/13/2019 0218 by Andrew Cleaning RN  Outcome: Ongoing  4/12/2019 1932 by Lisbeth Sullivan RN  Outcome: Met This Shift  Goal: Absence of falls  4/13/2019 0218 by Andrew Cleaning RN  Outcome: Ongoing  4/12/2019 1932 by Lisbeth Sullivan RN  Outcome: Met This Shift     Problem: Pain:  Goal: Pain level will decrease  Description  Pain level will decrease  4/13/2019 0218 by Andrew Cleaning RN  Outcome: Ongoing  Note:   Patient given morphine for ease of comfort of breathing and for rest.  Patient able to rest after morphine given. 4/12/2019 1932 by Lisbeth Sullivan RN  Outcome: Met This Shift  Note:   No pain during shift. Will continue to monitor. Goal: Control of acute pain  Description  Control of acute pain  4/13/2019 0218 by Andrew Cleaning RN  Outcome: Ongoing  4/12/2019 1932 by Lisbeth Sullivan RN  Outcome: Met This Shift  Goal: Control of chronic pain  Description  Control of chronic pain  4/13/2019 0218 by nAdrew Cleaning RN  Outcome: Ongoing  4/12/2019 1932 by Lisbeth Sullivan RN  Outcome: Met This Shift     Problem: Nutrition  Goal: Optimal nutrition therapy  4/13/2019 0218 by Andrew Cleaning RN  Outcome: Ongoing  4/12/2019 1932 by Lisbeth Sullivan RN  Outcome: Met This Shift  Note:   No nausea or vomiting. Pt tolerating diet with no issues.

## 2019-04-14 LAB
ABSOLUTE BANDS #: 0.8 K/UL (ref 0–1)
ABSOLUTE EOS #: 0 K/UL (ref 0–0.4)
ABSOLUTE IMMATURE GRANULOCYTE: ABNORMAL K/UL (ref 0–0.3)
ABSOLUTE LYMPH #: 0.4 K/UL (ref 1–4.8)
ABSOLUTE MONO #: 0.3 K/UL (ref 0.1–1.3)
ANION GAP SERPL CALCULATED.3IONS-SCNC: 8 MMOL/L (ref 9–17)
BANDS: 8 % (ref 0–10)
BASOPHILS # BLD: 0 % (ref 0–2)
BASOPHILS ABSOLUTE: 0 K/UL (ref 0–0.2)
BUN BLDV-MCNC: 46 MG/DL (ref 8–23)
BUN/CREAT BLD: ABNORMAL (ref 9–20)
CALCIUM SERPL-MCNC: 9.6 MG/DL (ref 8.6–10.4)
CHLORIDE BLD-SCNC: 94 MMOL/L (ref 98–107)
CO2: 33 MMOL/L (ref 20–31)
CREAT SERPL-MCNC: 0.84 MG/DL (ref 0.5–0.9)
DIFFERENTIAL TYPE: ABNORMAL
EOSINOPHILS RELATIVE PERCENT: 0 % (ref 0–4)
GFR AFRICAN AMERICAN: >60 ML/MIN
GFR NON-AFRICAN AMERICAN: >60 ML/MIN
GFR SERPL CREATININE-BSD FRML MDRD: ABNORMAL ML/MIN/{1.73_M2}
GFR SERPL CREATININE-BSD FRML MDRD: ABNORMAL ML/MIN/{1.73_M2}
GLUCOSE BLD-MCNC: 148 MG/DL (ref 70–99)
HCT VFR BLD CALC: 40.4 % (ref 36–46)
HEMOGLOBIN: 13.1 G/DL (ref 12–16)
IMMATURE GRANULOCYTES: ABNORMAL %
LYMPHOCYTES # BLD: 4 % (ref 24–44)
MCH RBC QN AUTO: 29.2 PG (ref 26–34)
MCHC RBC AUTO-ENTMCNC: 32.5 G/DL (ref 31–37)
MCV RBC AUTO: 90 FL (ref 80–100)
MONOCYTES # BLD: 3 % (ref 1–7)
MORPHOLOGY: ABNORMAL
MORPHOLOGY: ABNORMAL
NRBC AUTOMATED: ABNORMAL PER 100 WBC
PDW BLD-RTO: 20.2 % (ref 11.5–14.9)
PLATELET # BLD: 188 K/UL (ref 150–450)
PLATELET ESTIMATE: ABNORMAL
PMV BLD AUTO: 9.6 FL (ref 6–12)
POTASSIUM SERPL-SCNC: 4.6 MMOL/L (ref 3.7–5.3)
RBC # BLD: 4.49 M/UL (ref 4–5.2)
RBC # BLD: ABNORMAL 10*6/UL
SEG NEUTROPHILS: 85 % (ref 36–66)
SEGMENTED NEUTROPHILS ABSOLUTE COUNT: 8.5 K/UL (ref 1.3–9.1)
SODIUM BLD-SCNC: 135 MMOL/L (ref 135–144)
WBC # BLD: 10 K/UL (ref 3.5–11)
WBC # BLD: ABNORMAL 10*3/UL

## 2019-04-14 PROCEDURE — 6370000000 HC RX 637 (ALT 250 FOR IP): Performed by: STUDENT IN AN ORGANIZED HEALTH CARE EDUCATION/TRAINING PROGRAM

## 2019-04-14 PROCEDURE — 36415 COLL VENOUS BLD VENIPUNCTURE: CPT

## 2019-04-14 PROCEDURE — 94660 CPAP INITIATION&MGMT: CPT

## 2019-04-14 PROCEDURE — 2700000000 HC OXYGEN THERAPY PER DAY

## 2019-04-14 PROCEDURE — 85025 COMPLETE CBC W/AUTO DIFF WBC: CPT

## 2019-04-14 PROCEDURE — 6370000000 HC RX 637 (ALT 250 FOR IP): Performed by: INTERNAL MEDICINE

## 2019-04-14 PROCEDURE — 2580000003 HC RX 258: Performed by: STUDENT IN AN ORGANIZED HEALTH CARE EDUCATION/TRAINING PROGRAM

## 2019-04-14 PROCEDURE — 97110 THERAPEUTIC EXERCISES: CPT

## 2019-04-14 PROCEDURE — 94640 AIRWAY INHALATION TREATMENT: CPT

## 2019-04-14 PROCEDURE — 2060000000 HC ICU INTERMEDIATE R&B

## 2019-04-14 PROCEDURE — 6360000002 HC RX W HCPCS: Performed by: INTERNAL MEDICINE

## 2019-04-14 PROCEDURE — 6360000002 HC RX W HCPCS: Performed by: STUDENT IN AN ORGANIZED HEALTH CARE EDUCATION/TRAINING PROGRAM

## 2019-04-14 PROCEDURE — 94762 N-INVAS EAR/PLS OXIMTRY CONT: CPT

## 2019-04-14 PROCEDURE — 80048 BASIC METABOLIC PNL TOTAL CA: CPT

## 2019-04-14 RX ORDER — PREDNISONE 20 MG/1
40 TABLET ORAL DAILY
Status: DISCONTINUED | OUTPATIENT
Start: 2019-04-14 | End: 2019-04-17

## 2019-04-14 RX ADMIN — POTASSIUM CHLORIDE 20 MEQ: 1500 TABLET, EXTENDED RELEASE ORAL at 09:58

## 2019-04-14 RX ADMIN — ASPIRIN 81 MG: 81 TABLET, COATED ORAL at 09:33

## 2019-04-14 RX ADMIN — PREDNISONE 40 MG: 20 TABLET ORAL at 11:46

## 2019-04-14 RX ADMIN — Medication 10 ML: at 21:00

## 2019-04-14 RX ADMIN — FUROSEMIDE 40 MG: 10 INJECTION, SOLUTION INTRAMUSCULAR; INTRAVENOUS at 09:33

## 2019-04-14 RX ADMIN — METHYLPREDNISOLONE SODIUM SUCCINATE 40 MG: 40 INJECTION, POWDER, LYOPHILIZED, FOR SOLUTION INTRAMUSCULAR; INTRAVENOUS at 03:30

## 2019-04-14 RX ADMIN — IPRATROPIUM BROMIDE AND ALBUTEROL SULFATE 1 AMPULE: .5; 3 SOLUTION RESPIRATORY (INHALATION) at 12:48

## 2019-04-14 RX ADMIN — ACETAMINOPHEN 650 MG: 325 TABLET, FILM COATED ORAL at 11:45

## 2019-04-14 RX ADMIN — PANTOPRAZOLE SODIUM 40 MG: 40 TABLET, DELAYED RELEASE ORAL at 05:25

## 2019-04-14 RX ADMIN — CITALOPRAM HYDROBROMIDE 20 MG: 20 TABLET ORAL at 09:33

## 2019-04-14 RX ADMIN — Medication 10 ML: at 18:10

## 2019-04-14 RX ADMIN — IPRATROPIUM BROMIDE AND ALBUTEROL SULFATE 1 AMPULE: .5; 3 SOLUTION RESPIRATORY (INHALATION) at 15:54

## 2019-04-14 RX ADMIN — ATORVASTATIN CALCIUM 80 MG: 80 TABLET, FILM COATED ORAL at 20:21

## 2019-04-14 RX ADMIN — APIXABAN 2.5 MG: 2.5 TABLET, FILM COATED ORAL at 20:21

## 2019-04-14 RX ADMIN — IPRATROPIUM BROMIDE AND ALBUTEROL SULFATE 1 AMPULE: .5; 3 SOLUTION RESPIRATORY (INHALATION) at 09:18

## 2019-04-14 RX ADMIN — Medication 1 TABLET: at 09:33

## 2019-04-14 RX ADMIN — APIXABAN 2.5 MG: 2.5 TABLET, FILM COATED ORAL at 09:34

## 2019-04-14 RX ADMIN — IPRATROPIUM BROMIDE AND ALBUTEROL SULFATE 1 AMPULE: .5; 3 SOLUTION RESPIRATORY (INHALATION) at 21:38

## 2019-04-14 RX ADMIN — LORAZEPAM 0.5 MG: 0.5 TABLET ORAL at 23:17

## 2019-04-14 RX ADMIN — METOPROLOL SUCCINATE 100 MG: 100 TABLET, EXTENDED RELEASE ORAL at 09:33

## 2019-04-14 RX ADMIN — FUROSEMIDE 40 MG: 10 INJECTION, SOLUTION INTRAMUSCULAR; INTRAVENOUS at 18:08

## 2019-04-14 RX ADMIN — MORPHINE SULFATE 2 MG: 2 INJECTION, SOLUTION INTRAMUSCULAR; INTRAVENOUS at 01:14

## 2019-04-14 RX ADMIN — Medication 10 ML: at 09:35

## 2019-04-14 ASSESSMENT — PAIN SCALES - GENERAL
PAINLEVEL_OUTOF10: 2
PAINLEVEL_OUTOF10: 3
PAINLEVEL_OUTOF10: 4
PAINLEVEL_OUTOF10: 0

## 2019-04-14 ASSESSMENT — PAIN DESCRIPTION - PAIN TYPE: TYPE: ACUTE PAIN

## 2019-04-14 ASSESSMENT — PAIN DESCRIPTION - LOCATION: LOCATION: HEAD

## 2019-04-14 NOTE — FLOWSHEET NOTE
04/14/19 0806   Provider Notification   Reason for Communication Evaluate   Provider Name Dr. Zina Obregon   Provider Notification Physician   Method of Communication Face to face   Response At bedside   Notification Time 0806     Rounded with Cardiologist. Per MD, pt is diuresing well. Dr. Zina Obregon was going to change pt to PO lasix but d/t discharge planning still being figured out (and won't be until tomorrow d/t hospice vs SNF), will continue on IV lasix for another day. Will continue to monitor.      Electronically signed by Janie Jordan RN on 4/14/2019 at 8:13 AM

## 2019-04-14 NOTE — PROGRESS NOTES
Progress Note    Patient Name:  Oneyda Barrera    :  1940  2019 10:02 AM      SUBJECTIVE       Ms. Guero Berg  Has  Appears to be fairly comfortable at rest.  States she does not feel as if she is short of breath. OBJECTIVE     Vital signs:    /74   Pulse 85   Temp 97.4 °F (36.3 °C)   Resp 15   Ht 5' 6\" (1.676 m)   Wt 224 lb 3.3 oz (101.7 kg)   LMP 1985   SpO2 92%   BMI 36.19 kg/m²  50 L/min  .tro    Admit Weight:  210 lb (95.3 kg)    Last 3 weights: Wt Readings from Last 3 Encounters:   19 224 lb 3.3 oz (101.7 kg)   19 194 lb (88 kg)   19 189 lb (85.7 kg)       BMI: Body mass index is 36.19 kg/m². Input/Output:       Intake/Output Summary (Last 24 hours) at 2019 1002  Last data filed at 2019 0525  Gross per 24 hour   Intake 425 ml   Output 2300 ml   Net -1875 ml       Date 19 0000 - 19 2359   Shift 4518-4557 3487-7501 3514-2132 24 Hour Total   INTAKE   P.O.(mL/kg/hr) 100(0.1)   100   Shift Total(mL/kg) 100(1)   100(1)   OUTPUT   Urine(mL/kg/hr) 600(0.7)   600   Shift Total(mL/kg) 600(5.8)   600(5.9)   Weight (kg) 104 101.7 101.7 101.7     Exam:     General appearance: awake and alert moves all ext   Lungs:  Some rhonchi, rales bilateral bases. Heart: S1 and S2 no murmur ; irregular  Rhythm, rate controlled. Extremities: warm and dry, no cyanosis, no clubbing        Laboratory Studies:     CBC:   Recent Labs     19  0442 19  0415 19  0404   WBC 4.6 11.2* 10.0   HGB 13.9 13.5 13.1   HCT 43.8 41.7 40.4   MCV 91.0 91.4 90.0    215 188     BMP:   Recent Labs     19  0442 19  0415 19  0404   * 134* 135   K 4.8 5.0 4.6   CL 93* 95* 94*   CO2 30 31 33*   BUN 31* 39* 46*   CREATININE 0.88 0.76 0.84     PT/INR: No results for input(s): PROTIME, INR in the last 72 hours. APTT: No results for input(s): APTT in the last 72 hours. MAG: No results for input(s): MG in the last 72 hours.   D Dimer: No results for input(s): DDIMER in the last 72 hours. Troponin  Invalid input(s): TROP      troponins  BNP No results for input(s): BNP in the last 72 hours. No results for input(s): PROBNP in the last 72 hours. Pulse Ox: SpO2  Av.3 %  Min: 86 %  Max: 94 %  Supplemental O2: O2 Flow Rate (L/min): 50 L/min     Current Meds:    furosemide  40 mg Intravenous BID    methylPREDNISolone  40 mg Intravenous Q12H    ipratropium-albuterol  1 ampule Inhalation 4x daily    potassium chloride  20 mEq Oral BID WC    apixaban  2.5 mg Oral BID    aspirin  81 mg Oral Daily    atorvastatin  80 mg Oral Daily    calcium carbonate-vitamin D  1 tablet Oral Daily    citalopram  20 mg Oral Daily    metoprolol succinate  100 mg Oral Daily    pantoprazole  40 mg Oral QAM AC    sodium chloride flush  10 mL Intravenous 2 times per day     Continuous Infusions:          ASSESSMENT     Principal Problem:    Acute on chronic diastolic CHF (congestive heart failure) (HCC)  Active Problems:    GERD (gastroesophageal reflux disease)    Anxiety    Hypertension    Atrial fibrillation (HCC)    Pulmonary fibrosis (HCC)    Mixed hyperlipidemia    Acute cystitis without hematuria    Interstitial lung disease (HCC)    Centrilobular emphysema (HCC)    Pulmonary hypertension (HCC)    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 *    1. Acute on chronic right and left heart congestive heart failure with cor pulmonale. Ejection fraction noted to be 45-50% the past.  Stable situation. In will give another day of IV diuretics. Appeared to respond appropriately in BUN and creatinine stable     2. Severe tricuspid regurgitation with severe pulmonary hypertension secondary pulmonary issues.   Hospice has been discussed with the patient and the family and I believe removing that way; going to rehab for a week or 2 before going home. 3. Longstanding persistent atrial fibrillation. Rate is reasonable    4. History of coronary artery disease. 5. Chronic shortness of breath at rest with pulmonary fibrosis. Stay with intravenous diuretics as it sounds if she is going to be here today. Will switch to p.o. In the morning.     PLAN           Electronically signed by Eugene Lew MD on 4/14/2019 at 10:02 AM

## 2019-04-14 NOTE — PROGRESS NOTES
Reena Mortimer is a 78 y.o. female patient.     Current Facility-Administered Medications   Medication Dose Route Frequency Provider Last Rate Last Dose    furosemide (LASIX) injection 40 mg  40 mg Intravenous BID Komal Cox MD   40 mg at 04/13/19 1723    methylPREDNISolone sodium (SOLU-MEDROL) injection 40 mg  40 mg Intravenous Q12H Edgard Hoyos MD   40 mg at 04/14/19 0330    ipratropium-albuterol (DUONEB) nebulizer solution 1 ampule  1 ampule Inhalation 4x daily Deloise Kawasaki, MD   1 ampule at 04/13/19 2059    potassium chloride (KLOR-CON M) extended release tablet 20 mEq  20 mEq Oral BID [facility] MD Moise   20 mEq at 04/12/19 1811    apixaban (ELIQUIS) tablet 2.5 mg  2.5 mg Oral BID Hua Tay MD   2.5 mg at 04/13/19 2045    aspirin EC tablet 81 mg  81 mg Oral Daily Hua Tay MD   81 mg at 04/13/19 0847    atorvastatin (LIPITOR) tablet 80 mg  80 mg Oral Daily Hua Tay MD   80 mg at 04/13/19 2045    calcium carbonate-vitamin D (CALTRATE) 600-400 MG-UNIT per tab 1 tablet  1 tablet Oral Daily Hua Tay MD   1 tablet at 04/13/19 0846    citalopram (CELEXA) tablet 20 mg  20 mg Oral Daily Hua Tay MD   20 mg at 04/13/19 0846    LORazepam (ATIVAN) tablet 0.5 mg  0.5 mg Oral Q8H PRN Hua Tay MD   0.5 mg at 04/13/19 0846    metoprolol succinate (TOPROL XL) extended release tablet 100 mg  100 mg Oral Daily Hua Tay MD   100 mg at 04/13/19 0846    pantoprazole (PROTONIX) tablet 40 mg  40 mg Oral QAM AC Hua Tay MD   40 mg at 04/14/19 0525    sodium chloride flush 0.9 % injection 10 mL  10 mL Intravenous 2 times per day Hua Tay MD   10 mL at 04/13/19 2100    sodium chloride flush 0.9 % injection 10 mL  10 mL Intravenous PRN Hua Tay MD   10 mL at 04/11/19 1709    acetaminophen (TYLENOL) tablet 650 mg  650 mg Oral Q4H PRN Hua Tay MD        morphine (PF) injection 2 mg  2 mg Intravenous Q2H PRN Sendy Krishnan Kaleigh Del Real MD   2 mg at 04/14/19 0114    Or    morphine sulfate (PF) injection 4 mg  4 mg Intravenous Q2H PRN Jojo King MD        ondansetron [facility]) injection 4 mg  4 mg Intravenous Q8H PRN Jojo King MD         Allergies   Allergen Reactions    Penicillins Rash     Principal Problem:    Acute on chronic diastolic CHF (congestive heart failure) (HCC)  Active Problems:    GERD (gastroesophageal reflux disease)    Anxiety    Hypertension    Atrial fibrillation (HCC)    Pulmonary fibrosis (HCC)    Mixed hyperlipidemia    Acute cystitis without hematuria    Interstitial lung disease (HCC)    Centrilobular emphysema (HCC)    Pulmonary hypertension (HCC)    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 *    Blood pressure (!) 141/68, pulse 81, temperature 97.6 °F (36.4 °C), temperature source Axillary, resp. rate 21, height 5' 6\" (1.676 m), weight 229 lb 4.5 oz (104 kg), last menstrual period 11/01/1985, SpO2 92 %, not currently breastfeeding. Subjective:  Symptoms:  Stable. (High flow oxygen on. ). Activity level: Impaired due to weakness. Pain:  She reports no pain. 1675 mL fluid out yesterday     Objective:  General Appearance:  Comfortable. Vital signs: (most recent): Blood pressure (!) 141/68, pulse 81, temperature 97.6 °F (36.4 °C), temperature source Axillary, resp. rate 21, height 5' 6\" (1.676 m), weight 229 lb 4.5 oz (104 kg), last menstrual period 11/01/1985, SpO2 92 %, not currently breastfeeding. Vital signs are normal.    Lungs: There are decreased breath sounds. Heart: Normal rate. Regular rhythm. S1 normal and S2 normal.  Positive for murmur.       Assessment & Plan  D/c planning per pulmonology/cardiology  Darline Arenas MD  4/14/2019

## 2019-04-14 NOTE — PROGRESS NOTES
Physical Therapy  Facility/Department: [facility] ICU  Daily Treatment Note  NAME: Lucio Mccord  : 1940  MRN: [id number]    Date of Service: 2019    Discharge Recommendations:  Subacute/Skilled Nursing Facility   PT Equipment Recommendations  Equipment Needed: No    Patient Diagnosis(es): The encounter diagnosis was Congestive heart failure, unspecified HF chronicity, unspecified heart failure type ([address] has a past medical history of Acute cystitis without hematuria, Anxiety, Atrial fibrillation ([address] Orantes's esophagus, Centrilobular emphysema (HCC), Chronic diastolic CHF (congestive heart failure) ([address] Chronic hypoxemic respiratory failure ([address] GERD (gastroesophageal reflux disease), Heart disease, Hypertension, Hypotension, unspecified, Iatrogenic hypotension, Interstitial lung disease ([address] Lipids abnormal, Mixed hyperlipidemia, LIANET on CPAP, Pneumonia, Pulmonary embolus ([address] Pulmonary fibrosis ([address] Pulmonary hypertension ([address] S/P CABG x 4, and UIP (usual interstitial pneumonitis) ([address] has a past surgical history that includes Breast biopsy (); Cardiac surgery (; ); Cholecystectomy (); Cardioversion; and Bypass Graft (N/A).     Restrictions  Restrictions/Precautions  Restrictions/Precautions: Fall Risk  Required Braces or Orthoses?: No  Implants present? : Metal implants(strnal wires from CABG)  Subjective   Subjective  Subjective: no complains of pain but tired  Pain Screening  Patient Currently in Pain: No  Vital Signs  Patient Currently in Pain: No       Orientation  Orientation  Overall Orientation Status: Within Normal Limits  Cognition    WNL  Objective   Bed mobility  Supine to Sit: Minimal assistance  Transfers  Sit to Stand: Contact guard assistance  Stand to sit: Contact guard assistance  Bed to Chair: Contact guard assistance  Stand Pivot Transfers: Contact guard assistance  Ambulation 1  Surface: level tile  Device: Rolling Walker  Other Apparatus: O2  Assistance: Contact guard assistance  Distance: 10ft-fom bed to chair,unable to go further due lines connected to patient  Stairs/Curb  Stairs?: No     Assessment   REQUIRES PT FOLLOW UP: Yes  Activity Tolerance  Activity Tolerance: Patient limited by fatigue     Goals  Short term goals  Time Frame for Short term goals: 5-7 treatments/ week  Short term goal 1: pt to roll independently using rail for position change  Short term goal 2: pt to transfer supine to sit w/ supervision using rail and sit to supine w/ mod x 1  Short term goal 3: pt to transfer sit to stand w/ SBA x 1 and bed to chair using w walker w/ CGA x 1  Short term goal 4: pt to ambulate w/ w walker 20-30' w/ CGA x 1 and O2  Short term goal 5: pt to demonstrate good balance using w walker  Short term goal 6: pt to demonstrate good technique for LE strengthening exercises and energy conservation techniques  Short term goal 7: pt to tolerate 1/2 hour of therapuetic exercise keeping O2 sats 90% or higher  Patient Goals   Patient goals : walk    Plan    Plan  Times per week: 5-7 treatments/ week  Times per day: (5-7 treatments/ week)  Specific instructions for Next Treatment: 4-8-19 SNF W/ PULMONARY REHAB; gait w/ w walker 5' x 3 forward and back w/ CGA x 1 w/ O2 at 6 L, watch O2 sats  Current Treatment Recommendations: Strengthening, Transfer Training, Endurance Training, Patient/Caregiver Education & Training, Balance Training, Gait Training, Functional Mobility Training, Stair training, Safety Education & Training  Plan Comment: to continue PT per POC  Safety Devices  Type of devices: Left in chair, Call light within reach  Restraints  Initially in place: No     Therapy Time   Individual Concurrent Group Co-treatment   Time In 1000         Time Out 1015         Minutes 15         Timed Code Treatment Minutes: 15 Minutes     Electronically signed by: Leora Hamman, PT

## 2019-04-14 NOTE — CARE COORDINATION
DISCHARGE PLANNING NOTE:    I spoke with patients son Lon Mancuso (Jaja Cantrell). He states they want to send mother somewhere where they can accept high flow O2 as they want to try and get her some rehab first and he knows the patient eventually needs Hospice. 1st choice is [address] [place] [address] of [address] [place] [address]. 2nd choice is anywhere is [place] that will accept High Flow O2. I called Nilda Guardado with [address] [place] [address] of [address] [place] [address] at [phone] and left a message inquiring if they are able to accommodate high flow O2 - Awaiting call back. MD recommendations are inpatient hospice with eventual transition to home hospice. Unsure if any SNF is able to accommodate high flow O2. Will continue to follow along. Electronically signed by Leyla Pink RN on 4/14/2019 at 12:48 PM     I received a call from Nilda Guardado at [facility] and she states that she is almost positive they have done high flow before. She needs to verify this on Monday with her respiratory staff. I informed her the patient desats to 70's80's with minimal exertion and she stated that they have a bed available right next to nurses station and they could place her there. I faxed referral to Nilda Guardado at [phone]. LSW to follow up on this on Monday. Electronically signed by Leyla Pink RN on 4/14/2019 at 12:58 PM    I spoke with son Lon Mancuso [facility]) and informed him of the above information. He was happy with the efforts made and if [facility] is able to accommodate High flow o2, he does want patient to go there. States he may change patient to [facility]. He is going to follow-up with  on Monday.      Electronically signed by Leyla Pink RN on 4/14/2019 at 2:43 PM

## 2019-04-14 NOTE — PLAN OF CARE
Problem: Falls - Risk of:  Goal: Will remain free from falls  Description  Will remain free from falls  4/14/2019 0327 by Negrito Madrid RN  Outcome: Ongoing  Patient remains safe and free from falls. Problem: Falls - Risk of:  Goal: Absence of physical injury  Description  Absence of physical injury  4/14/2019 0327 by Negrito Madird RN  Outcome: Ongoing  Pt assessed as a fall risk this shift. Remains free from falls and accidental injury at this time. Fall precautions in place, including falling star sign and fall risk band on pt. Floor free from obstacles, and bed is locked and in lowest position. Adequate lighting provided. Pt encouraged to call before getting OOB for any need. Bed alarm activated. Will continue to monitor needs during hourly rounding, and reinforce education on use of call light. Problem: Risk for Impaired Skin Integrity  Goal: Tissue integrity - skin and mucous membranes  Description  Structural intactness and normal physiological function of skin and  mucous membranes. 4/14/2019 0327 by Negrito Madrid RN  Outcome: Ongoing  4/13/2019 1606 by Jory Eric RN  Outcome: Ongoing  Note:   The patient's skin remains dry and intact. Assist patient with turning Q2 hours and PRN. Problem: Breathing Pattern - Ineffective:  Goal: Ability to achieve and maintain a regular respiratory rate will improve  Description  Ability to achieve and maintain a regular respiratory rate will improve  4/14/2019 0327 by Negrito Madrid RN  Outcome: Ongoing  4/13/2019 1606 by Jory Eric RN  Outcome: Ongoing  Note:   Patient remains on heated high flow oxygen at 50L. Per Dr. Cari Sears keep oxygen saturation at 87% or above.       Problem: Musculor/Skeletal Functional Status  Goal: Highest potential functional level  4/14/2019 0327 by Negrito Madrid RN  Outcome: Ongoing  4/13/2019 1606 by Jory Eric RN  Outcome: Ongoing  Note:   Patient still experiences dyspnea upon exertion therefore ambulation and strengthening is difficult at this time.

## 2019-04-14 NOTE — PROGRESS NOTES
ICU Progress Note (Non-Vent)  Pulmonary and Critical Care Specialists    Patient - Augie Balderrama,  Age - 78 y.o.    - 1940      Room Number -    MRN -  [id number]   [facility]  Date of Admission -  2019  4:16 PM    Events of Past 24 Hours   Uneventful night, denies any worsening dyspnea    Vitals    height is 5' 6\" (1.676 m) and weight is 224 lb 3.3 oz (101.7 kg). Her temperature is 97.4 °F (36.3 °C). Her blood pressure is 111/74 and her pulse is 85. Her respiration is 15 and oxygen saturation is 92%.        Temperature Range: Temp: 97.4 °F (36.3 °C) Temp  Av.6 °F (36.4 °C)  Min: 97.4 °F (36.3 °C)  Max: 98.1 °F (36.7 °C)  BP Range:  Systolic (30PKR), CLA:323 , Min:95 , MTS:337     Diastolic (19CJC), IQL:51, Min:59, Max:95    Pulse Range: Pulse  Av.2  Min: 73  Max: 85  Respiration Range: Resp  Av.6  Min: 12  Max: 31  Current Pulse Ox[de-identified]  SpO2: 92 %  24HR Pulse Ox Range:  SpO2  Av.3 %  Min: 86 %  Max: 94 %  Oxygen Amount and Delivery: O2 Flow Rate (L/min): 50 L/min    Wt Readings from Last 3 Encounters:   19 224 lb 3.3 oz (101.7 kg)   19 194 lb (88 kg)   19 189 lb (85.7 kg)     I/O       Intake/Output Summary (Last 24 hours) at 2019 1027  Last data filed at 2019 0525  Gross per 24 hour   Intake 425 ml   Output 2300 ml   Net -1875 ml     DRAIN/TUBE OUTPUT       Invasive Lines   ICP PRESSURE RANGE  No data recorded  CVP PRESSURE RANGE  No data recorded      Medications      furosemide  40 mg Intravenous BID    methylPREDNISolone  40 mg Intravenous Q12H    ipratropium-albuterol  1 ampule Inhalation 4x daily    potassium chloride  20 mEq Oral BID WC    apixaban  2.5 mg Oral BID    aspirin  81 mg Oral Daily    atorvastatin  80 mg Oral Daily    calcium carbonate-vitamin D  1 tablet Oral Daily    citalopram  20 mg Oral Daily    metoprolol succinate  100 mg Oral Daily  pantoprazole  40 mg Oral QAM AC    sodium chloride flush  10 mL Intravenous 2 times per day     LORazepam, sodium chloride flush, acetaminophen, morphine **OR** morphine, ondansetron  IV Drips/Infusions      Diet/Nutrition   DIET CARDIAC; Daily Fluid Restriction: 1500 ml    Exam      Constitutional - Alert, arousable  General Appearance  well developed, well nourished  HEENT -normocephalic, atraumatic. PERRLA  Lungs - Chest expands equally, no wheezes, bibasilar crackles   Cardiovascular - Heart sounds are normal.  normal rate and rhythm regular, no murmur, gallop or rub. Abdomen - soft, nontender, nondistended, no masses or organomegaly  Neurologic - CN II-XII are grossly intact.  There are no focal motor deficits  Skin - no bruising or bleeding  Extremities - no cyanosis, clubbing, ++edema    Lab Results   CBC     Lab Results   Component Value Date    WBC 10.0 04/14/2019    RBC 4.49 04/14/2019    RBC 4.53 10/18/2011    HGB 13.1 04/14/2019    HCT 40.4 04/14/2019     04/14/2019     10/18/2011    MCV 90.0 04/14/2019    MCH 29.2 04/14/2019    MCHC 32.5 04/14/2019    RDW 20.2 04/14/2019    LYMPHOPCT 4 04/14/2019    MONOPCT 3 04/14/2019    BASOPCT 0 04/14/2019    MONOSABS 0.30 04/14/2019    LYMPHSABS 0.40 04/14/2019    EOSABS 0.00 04/14/2019    BASOSABS 0.00 04/14/2019    DIFFTYPE NOT REPORTED 04/14/2019       BMP   Lab Results   Component Value Date     04/14/2019    K 4.6 04/14/2019    CL 94 04/14/2019    CO2 33 04/14/2019    BUN 46 04/14/2019    CREATININE 0.84 04/14/2019    GLUCOSE 148 04/14/2019       LFTS  Lab Results   Component Value Date    ALKPHOS 189 01/30/2019    ALT 20 01/30/2019    AST 37 01/30/2019    PROT 7.2 01/30/2019    BILITOT 1.70 01/30/2019    BILIDIR 0.08 09/12/2017    IBILI 0.12 09/12/2017    LABALBU 3.2 01/30/2019       ABG ABGs:   Lab Results   Component Value Date    PHART 7.404 01/24/2019    PO2ART 64.1 01/24/2019    VSV4VOW 46.1 01/24/2019       Lab Results Component Value Date    MODE NOT REPORTED 01/24/2019         INR  No results for input(s): PROTIME, INR in the last 72 hours. APTT  No results for input(s): APTT in the last 72 hours. Lactic Acid  No results found for: LACTA     BNP   No results for input(s): BNP in the last 72 hours. Cultures       Radiology           SYSTEMS ASSESSMENT    Acute on chronic hypoxic respiratory failure  Acute on chronic CHF  History of atrial fibrillation  LIANET        Neuro   Intermittently anxious, but I feel that she is accepted her fate    Respiratory   Wean oxygen as tolerated.  Keep O2 sat > 87%  She is not a candidate for any new studies for IPF, discussed with Dr. Erasto Mota to oral prednisone today  Continue aerosols 4 times a day    Cardiovascular   Switching to oral Lasix per cardiology tomorrow  Goal will be to keep her on the dry side, even if she has prerenal indices     Gastrointestinal   Can take oral medications and eating as long as she is on the high flow    Renal   Will defer Lasix dose to cardiology as an outpatient    Infectious Disease   Not on any antibiotic    Hematology/Oncology   On Eliquis    Endocrine       Social/Spiritual/DNR/Disposition/Other     She cannot go to skilled nursing unit because she is on high flow oxygen  Should go to inpatient hospice with transition to home hospice    Critical Care Time   0 min    Electronically signed by Malini Nails MD on 4/14/2019 at 10:27 AM

## 2019-04-15 LAB
ABSOLUTE EOS #: 0 K/UL (ref 0–0.4)
ABSOLUTE IMMATURE GRANULOCYTE: ABNORMAL K/UL (ref 0–0.3)
ABSOLUTE LYMPH #: 0.17 K/UL (ref 1–4.8)
ABSOLUTE MONO #: 1.11 K/UL (ref 0.1–1.3)
ANION GAP SERPL CALCULATED.3IONS-SCNC: 7 MMOL/L (ref 9–17)
BASOPHILS # BLD: 0 % (ref 0–2)
BASOPHILS ABSOLUTE: 0 K/UL (ref 0–0.2)
BUN BLDV-MCNC: 56 MG/DL (ref 8–23)
BUN/CREAT BLD: ABNORMAL (ref 9–20)
CALCIUM SERPL-MCNC: 9.5 MG/DL (ref 8.6–10.4)
CHLORIDE BLD-SCNC: 96 MMOL/L (ref 98–107)
CO2: 35 MMOL/L (ref 20–31)
CREAT SERPL-MCNC: 0.92 MG/DL (ref 0.5–0.9)
DIFFERENTIAL TYPE: ABNORMAL
EOSINOPHILS RELATIVE PERCENT: 0 % (ref 0–4)
GFR AFRICAN AMERICAN: >60 ML/MIN
GFR NON-AFRICAN AMERICAN: 59 ML/MIN
GFR SERPL CREATININE-BSD FRML MDRD: ABNORMAL ML/MIN/{1.73_M2}
GFR SERPL CREATININE-BSD FRML MDRD: ABNORMAL ML/MIN/{1.73_M2}
GLUCOSE BLD-MCNC: 124 MG/DL (ref 70–99)
HCT VFR BLD CALC: 38.6 % (ref 36–46)
HEMOGLOBIN: 12.6 G/DL (ref 12–16)
IMMATURE GRANULOCYTES: ABNORMAL %
LYMPHOCYTES # BLD: 2 % (ref 24–44)
MCH RBC QN AUTO: 29.5 PG (ref 26–34)
MCHC RBC AUTO-ENTMCNC: 32.7 G/DL (ref 31–37)
MCV RBC AUTO: 90.3 FL (ref 80–100)
MONOCYTES # BLD: 13 % (ref 1–7)
MORPHOLOGY: ABNORMAL
NRBC AUTOMATED: ABNORMAL PER 100 WBC
PDW BLD-RTO: 19.8 % (ref 11.5–14.9)
PLATELET # BLD: 191 K/UL (ref 150–450)
PLATELET ESTIMATE: ABNORMAL
PMV BLD AUTO: 10 FL (ref 6–12)
POTASSIUM SERPL-SCNC: 4.7 MMOL/L (ref 3.7–5.3)
RBC # BLD: 4.27 M/UL (ref 4–5.2)
RBC # BLD: ABNORMAL 10*6/UL
SEG NEUTROPHILS: 85 % (ref 36–66)
SEGMENTED NEUTROPHILS ABSOLUTE COUNT: 7.22 K/UL (ref 1.3–9.1)
SODIUM BLD-SCNC: 138 MMOL/L (ref 135–144)
WBC # BLD: 8.5 K/UL (ref 3.5–11)
WBC # BLD: ABNORMAL 10*3/UL

## 2019-04-15 PROCEDURE — 6370000000 HC RX 637 (ALT 250 FOR IP): Performed by: INTERNAL MEDICINE

## 2019-04-15 PROCEDURE — 97530 THERAPEUTIC ACTIVITIES: CPT

## 2019-04-15 PROCEDURE — 2700000000 HC OXYGEN THERAPY PER DAY

## 2019-04-15 PROCEDURE — 99232 SBSQ HOSP IP/OBS MODERATE 35: CPT | Performed by: FAMILY MEDICINE

## 2019-04-15 PROCEDURE — 97110 THERAPEUTIC EXERCISES: CPT

## 2019-04-15 PROCEDURE — 6370000000 HC RX 637 (ALT 250 FOR IP): Performed by: STUDENT IN AN ORGANIZED HEALTH CARE EDUCATION/TRAINING PROGRAM

## 2019-04-15 PROCEDURE — 85025 COMPLETE CBC W/AUTO DIFF WBC: CPT

## 2019-04-15 PROCEDURE — 6360000002 HC RX W HCPCS: Performed by: STUDENT IN AN ORGANIZED HEALTH CARE EDUCATION/TRAINING PROGRAM

## 2019-04-15 PROCEDURE — 2580000003 HC RX 258: Performed by: STUDENT IN AN ORGANIZED HEALTH CARE EDUCATION/TRAINING PROGRAM

## 2019-04-15 PROCEDURE — 94762 N-INVAS EAR/PLS OXIMTRY CONT: CPT

## 2019-04-15 PROCEDURE — 97535 SELF CARE MNGMENT TRAINING: CPT

## 2019-04-15 PROCEDURE — 6360000002 HC RX W HCPCS: Performed by: INTERNAL MEDICINE

## 2019-04-15 PROCEDURE — 2060000000 HC ICU INTERMEDIATE R&B

## 2019-04-15 PROCEDURE — 80048 BASIC METABOLIC PNL TOTAL CA: CPT

## 2019-04-15 PROCEDURE — 94640 AIRWAY INHALATION TREATMENT: CPT

## 2019-04-15 PROCEDURE — 36415 COLL VENOUS BLD VENIPUNCTURE: CPT

## 2019-04-15 RX ADMIN — CITALOPRAM HYDROBROMIDE 20 MG: 20 TABLET ORAL at 08:34

## 2019-04-15 RX ADMIN — MORPHINE SULFATE 2 MG: 2 INJECTION, SOLUTION INTRAMUSCULAR; INTRAVENOUS at 00:30

## 2019-04-15 RX ADMIN — LORAZEPAM 0.5 MG: 0.5 TABLET ORAL at 21:50

## 2019-04-15 RX ADMIN — POTASSIUM CHLORIDE 20 MEQ: 1500 TABLET, EXTENDED RELEASE ORAL at 08:34

## 2019-04-15 RX ADMIN — APIXABAN 2.5 MG: 2.5 TABLET, FILM COATED ORAL at 08:34

## 2019-04-15 RX ADMIN — IPRATROPIUM BROMIDE AND ALBUTEROL SULFATE 1 AMPULE: .5; 3 SOLUTION RESPIRATORY (INHALATION) at 15:32

## 2019-04-15 RX ADMIN — POTASSIUM CHLORIDE 20 MEQ: 1500 TABLET, EXTENDED RELEASE ORAL at 17:55

## 2019-04-15 RX ADMIN — Medication 10 ML: at 08:35

## 2019-04-15 RX ADMIN — PANTOPRAZOLE SODIUM 40 MG: 40 TABLET, DELAYED RELEASE ORAL at 06:41

## 2019-04-15 RX ADMIN — FUROSEMIDE 40 MG: 10 INJECTION, SOLUTION INTRAMUSCULAR; INTRAVENOUS at 17:55

## 2019-04-15 RX ADMIN — APIXABAN 2.5 MG: 2.5 TABLET, FILM COATED ORAL at 20:35

## 2019-04-15 RX ADMIN — Medication 1 TABLET: at 08:35

## 2019-04-15 RX ADMIN — FUROSEMIDE 40 MG: 10 INJECTION, SOLUTION INTRAMUSCULAR; INTRAVENOUS at 08:34

## 2019-04-15 RX ADMIN — PREDNISONE 40 MG: 20 TABLET ORAL at 08:34

## 2019-04-15 RX ADMIN — MORPHINE SULFATE 4 MG: 4 INJECTION INTRAVENOUS at 21:50

## 2019-04-15 RX ADMIN — ATORVASTATIN CALCIUM 80 MG: 80 TABLET, FILM COATED ORAL at 20:35

## 2019-04-15 RX ADMIN — ASPIRIN 81 MG: 81 TABLET, COATED ORAL at 08:34

## 2019-04-15 RX ADMIN — METOPROLOL SUCCINATE 100 MG: 100 TABLET, EXTENDED RELEASE ORAL at 08:34

## 2019-04-15 RX ADMIN — IPRATROPIUM BROMIDE AND ALBUTEROL SULFATE 1 AMPULE: .5; 3 SOLUTION RESPIRATORY (INHALATION) at 10:48

## 2019-04-15 RX ADMIN — IPRATROPIUM BROMIDE AND ALBUTEROL SULFATE 1 AMPULE: .5; 3 SOLUTION RESPIRATORY (INHALATION) at 06:50

## 2019-04-15 RX ADMIN — IPRATROPIUM BROMIDE AND ALBUTEROL SULFATE 1 AMPULE: .5; 3 SOLUTION RESPIRATORY (INHALATION) at 20:41

## 2019-04-15 RX ADMIN — Medication 10 ML: at 20:35

## 2019-04-15 ASSESSMENT — PAIN DESCRIPTION - LOCATION
LOCATION: HEAD
LOCATION: HEAD

## 2019-04-15 ASSESSMENT — PAIN SCALES - GENERAL
PAINLEVEL_OUTOF10: 4
PAINLEVEL_OUTOF10: 2
PAINLEVEL_OUTOF10: 4
PAINLEVEL_OUTOF10: 3
PAINLEVEL_OUTOF10: 7

## 2019-04-15 ASSESSMENT — PAIN DESCRIPTION - PAIN TYPE
TYPE: ACUTE PAIN
TYPE: ACUTE PAIN

## 2019-04-15 NOTE — PROGRESS NOTES
Patient placed on bipap due to increased SOB. Patient states breathing feels easier on bipap, patient spo2 91% at this time on settings of 12/6 70% Fio2.

## 2019-04-15 NOTE — CARE COORDINATION
Writer received a [employer] message from [employer] of [facility]. She said they can take [place] at [facility].  She requested writer fax info ,info faxed

## 2019-04-15 NOTE — CARE COORDINATION
ONGOING DISCHARGE PLAN:    Plan remains for LSW to continue to follow for DC to SNF, the [address] They can accept pt. On [address], [address]. Per Keenan Holbrook, They do have a bed, available today, but need just a few hours to get equipment & everything else arranged. Pt remains on IV Lasix, 40 MG, Bid, most likely will switch to oral.     Per Pulmonary Notes, Pt. Is not ready for SNF yet. Pt. Is agreeable to SNF, & states \"I am just afraid, if I go to Hospice, I will never leave there\". ?DC patricio. Will continue to follow for additional discharge needs.     Electronically signed by Rey Darby RN on 4/15/2019 at 1:28 PM

## 2019-04-15 NOTE — PROGRESS NOTES
FAMILY MEDICINE  - PROGRESS NOTE    Date:  4/15/2019  Marilee Hanson  [id number]      Chief Complaint   Patient presents with    Shortness of Breath         Interval History:  not changed much, she is sleeping soundly. Her nurse reports that she did not sleep well overnight & they just put the BiPAP back on.       Subjective  Respiratory: positive for emphysema and shortness of breath  Cardiovascular: positive for irregular heart beat and lower extremity edema  Musculoskeletal:positive for arthralgias and myalgias  Behavioral/Psych: positive for anxiety and obesity:    Objective:    BP (!) 101/55   Pulse 84   Temp 97.5 °F (36.4 °C) (Oral)   Resp 25   Ht 5' 6\" (1.676 m)   Wt 224 lb 3.3 oz (101.7 kg)   LMP 11/01/1985   SpO2 93%   BMI 36.19 kg/m²   General appearance - overweight  Mental status - drowsy  Eyes - not examined  Ears - bilateral TM's and external ear canals normal  Nose - normal and patent, no erythema, discharge or polyps  Mouth - mucous membranes moist, pharynx normal without lesions  Neck - supple, no significant adenopathy  Lymphatics - no palpable lymphadenopathy, no hepatosplenomegaly  Chest - decreased air entry noted posteriorly  Heart - irregularly irregular rhythm with rate 84  Abdomen - soft, nontender, nondistended, no masses or organomegaly  Breasts - not examined  Back exam - not examined  Neurological - neck supple without rigidity  Musculoskeletal - osteoarthritic changes noted in both hands  Extremities - pedal edema trace +  Skin - normal coloration and turgor, no rashes, no suspicious skin lesions noted    Data:   Medications:   Current Facility-Administered Medications   Medication Dose Route Frequency Provider Last Rate Last Dose    predniSONE (DELTASONE) tablet 40 mg  40 mg Oral Daily Orin Pereira MD   40 mg at 04/14/19 1146    furosemide (LASIX) injection 40 mg  40 mg Intravenous BID Ayesha Breen MD   40 mg at 04/14/19 1808    ipratropium-albuterol (DUONEB) nebulizer solution 1 ampule  1 ampule Inhalation 4x daily Tana Altamirano MD   1 ampule at 04/15/19 0650    potassium chloride (KLOR-CON M) extended release tablet 20 mEq  20 mEq Oral BID  Shane Mary Phipps MD   20 mEq at 04/14/19 0958    apixaban (ELIQUIS) tablet 2.5 mg  2.5 mg Oral BID Wolfgang Zurita MD   2.5 mg at 04/14/19 2021    aspirin EC tablet 81 mg  81 mg Oral Daily Wolfgang Zurita MD   81 mg at 04/14/19 0933    atorvastatin (LIPITOR) tablet 80 mg  80 mg Oral Daily Wolfgang Zurita MD   80 mg at 04/14/19 2021    calcium carbonate-vitamin D (CALTRATE) 600-400 MG-UNIT per tab 1 tablet  1 tablet Oral Daily Wolfgang Zurita MD   1 tablet at 04/14/19 0933    citalopram (CELEXA) tablet 20 mg  20 mg Oral Daily Wolfgang Zurita MD   20 mg at 04/14/19 0933    LORazepam (ATIVAN) tablet 0.5 mg  0.5 mg Oral Q8H PRN Wolfgang Zurita MD   0.5 mg at 04/14/19 2317    metoprolol succinate (TOPROL XL) extended release tablet 100 mg  100 mg Oral Daily Wolfgang Zurita MD   100 mg at 04/14/19 0933    pantoprazole (PROTONIX) tablet 40 mg  40 mg Oral QAM AC Wolfgang Zurita MD   40 mg at 04/15/19 0641    sodium chloride flush 0.9 % injection 10 mL  10 mL Intravenous 2 times per day Wolfgang Zurita MD   10 mL at 04/14/19 2100    sodium chloride flush 0.9 % injection 10 mL  10 mL Intravenous PRN Wolfgang Zurita MD   10 mL at 04/14/19 1810    acetaminophen (TYLENOL) tablet 650 mg  650 mg Oral Q4H PRN Wolfgang Zurita MD   650 mg at 04/14/19 1145    morphine (PF) injection 2 mg  2 mg Intravenous Q2H PRN Wolfgang Zurita MD   2 mg at 04/15/19 0030    Or    morphine sulfate (PF) injection 4 mg  4 mg Intravenous Q2H PRN Wolfgang Zurita MD        ondansetron [facility]) injection 4 mg  4 mg Intravenous Q8H PRN Wolfgang Zurita MD           Intake/Output Summary (Last 24 hours) at 4/15/2019 0706  Last data filed at 4/15/2019 0530  Gross per 24 hour   Intake 920 ml Output 1525 ml   Net -605 ml     Recent Results (from the past 24 hour(s))   Basic Metabolic Prof    Collection Time: 04/15/19  4:29 AM   Result Value Ref Range    Glucose 124 (H) 70 - 99 mg/dL    BUN 56 (H) 8 - 23 mg/dL    CREATININE 0.92 (H) 0.50 - 0.90 mg/dL    Bun/Cre Ratio NOT REPORTED 9 - 20    Calcium 9.5 8.6 - 10.4 mg/dL    Sodium 138 135 - 144 mmol/L    Potassium 4.7 3.7 - 5.3 mmol/L    Chloride 96 (L) 98 - 107 mmol/L    CO2 35 (H) 20 - 31 mmol/L    Anion Gap 7 (L) 9 - 17 mmol/L    GFR Non-African American 59 (L) >60 mL/min    GFR African American >60 >60 mL/min    GFR Comment          GFR Staging NOT REPORTED    CBC with DIFF    Collection Time: 04/15/19  4:29 AM   Result Value Ref Range    WBC 8.5 3.5 - 11.0 k/uL    RBC 4.27 4.0 - 5.2 m/uL    Hemoglobin 12.6 12.0 - 16.0 g/dL    Hematocrit 38.6 36 - 46 %    MCV 90.3 80 - 100 fL    MCH 29.5 26 - 34 pg    MCHC 32.7 31 - 37 g/dL    RDW 19.8 (H) 11.5 - 14.9 %    Platelets 124 719 - 504 k/uL    MPV 10.0 6.0 - 12.0 fL    NRBC Automated NOT REPORTED per 100 WBC    Differential Type NOT REPORTED     Immature Granulocytes NOT REPORTED 0 %    Absolute Immature Granulocyte NOT REPORTED 0.00 - 0.30 k/uL    WBC Morphology NOT REPORTED     RBC Morphology NOT REPORTED     Platelet Estimate NOT REPORTED     Seg Neutrophils 85 (H) 36 - 66 %    Lymphocytes 2 (L) 24 - 44 %    Monocytes 13 (H) 1 - 7 %    Eosinophils % 0 0 - 4 %    Basophils 0 0 - 2 %    Segs Absolute 7.22 1.3 - 9.1 k/uL    Absolute Lymph # 0.17 (L) 1.0 - 4.8 k/uL    Absolute Mono # 1.11 0.1 - 1.3 k/uL    Absolute Eos # 0.00 0.0 - 0.4 k/uL    Basophils # 0.00 0.0 - 0.2 k/uL    Morphology ANISOCYTOSIS PRESENT      -----------------------------------------------------------------  RAD:  EKG:  Micro:     Assessment & Plan:    Patient Active Problem List:     GERD (gastroesophageal reflux disease)     Anxiety     Lipids abnormal     Hypertension     Heart disease     Atrial fibrillation (HCC)     Pulmonary

## 2019-04-15 NOTE — PROGRESS NOTES
Report: No visual complaint reported. Assessment  Performance deficits / Impairments: Decreased functional mobility ; Decreased ADL status; Decreased endurance;Decreased balance;Decreased high-level IADLs  Prognosis: Good  Discharge Recommendations: Subacute/Skilled Nursing Facility;Continue to assess pending progress(w/pulmonary rehab )  Activity Tolerance: Treatment limited secondary to medical complications (free text)(req multiple RB during minimal exertion)  Safety Devices in place: Yes  Type of devices: Patient at risk for falls;Gait belt;Left in bed;Call light within reach;Nurse notified  Equipment Recommendations  Equipment Needed: Yes  Grab bars: for shower transfers  Reacher: yes  Sock Aid: yes  Long-handled Shoehorn: yes  Other: long handled sponge          Patient Education:  Patient Education: OT POC, EC/WS, AE/DME, breathing tech for SOB mgt, HEP  Learner:patient  Method: demonstration, explanation and handout       Outcome: acknowledged understanding, demonstrated understanding and asked questions     Plan  Safety Devices  Safety Devices in place: Yes  Type of devices: Patient at risk for falls, Gait belt, Left in bed, Call light within reach, Nurse notified  Plan  Times per week: 5-7  Times per day: Daily  Current Treatment Recommendations: Balance Training, Functional Mobility Training, Endurance Training, Safety Education & Training, Patient/Caregiver Education & Training, Equipment Evaluation, Education, & procurement, Self-Care / ADL      Goals  Short term goals  Time Frame for Short term goals: By Discharge  Short term goal 1: Pt will actively participate in self-care routine while sitting EOB or in chair and maintaining O2 levels 88%+. Short term goal 2: Pt will V/D good understanding of AE/modified techniques for LB ADL's and complete tasks with Min A. Short term goal 3: Pt will complete toilet transfer and toileting with SBA and Good safety using appropriate DME.   Short term goal 4:

## 2019-04-15 NOTE — PROGRESS NOTES
Pulmonary Progress Note  Pulmonary and Critical Care Specialists      Patient - Dre Isidro,  Age - 78 y.o.    - 1940      Room Number -    MRN -  [id number]   Acct # - [de-identified]  Date of Admission -  2019  4:16 PM    Consulting 838 Loly Sanchez MD  Primary Care Physician - Gay Perez MD     SUBJECTIVE   No visible distress, on FIO2 75% and high flow    OBJECTIVE   VITALS    height is 5' 6\" (1.676 m) and weight is 224 lb 3.3 oz (101.7 kg). Her axillary temperature is 97.6 °F (36.4 °C). Her blood pressure is 104/61 and her pulse is 74. Her respiration is 17 and oxygen saturation is 93%. Body mass index is 36.19 kg/m². Temperature Range: Temp: 97.6 °F (36.4 °C) Temp  Av.6 °F (36.4 °C)  Min: 97.5 °F (36.4 °C)  Max: 97.8 °F (36.6 °C)  BP Range:  Systolic (21NJZ), JCY:271 , Min:91 , ZMC:119     Diastolic (48QCE), QCT:71, Min:45, Max:90    Pulse Range: Pulse  Av.6  Min: 71  Max: 86  Respiration Range: Resp  Av.7  Min: 12  Max: 31  Current Pulse Ox[de-identified]  SpO2: 93 %  24HR Pulse Ox Range:  SpO2  Av %  Min: 66 %  Max: 96 %  Oxygen Amount and Delivery: O2 Flow Rate (L/min): 60 L/min    Wt Readings from Last 3 Encounters:   19 224 lb 3.3 oz (101.7 kg)   19 194 lb (88 kg)   19 189 lb (85.7 kg)       I/O (24 Hours)    Intake/Output Summary (Last 24 hours) at 4/15/2019 1356  Last data filed at 4/15/2019 0530  Gross per 24 hour   Intake 680 ml   Output 600 ml   Net 80 ml       EXAM     General Appearance  Awake, alert, oriented, in no acute distress  HEENT - normocephalic, atraumatic. Neck - Supple,  trachea midline   Lungs - coarse  Heart Exam:PMI normal. No lifts, heaves, or thrills. RRR. No murmurs, clicks, gallops, or rubs  Abdomen Exam: Abdomen soft, non-tender.  BS normal.  Extremity Exam: no edema    MEDS      predniSONE  40 mg Oral Daily    furosemide  40 mg Intravenous BID    ipratropium-albuterol  1 ampule Inhalation 4x daily    potassium chloride  20 mEq Oral BID WC    apixaban  2.5 mg Oral BID    aspirin  81 mg Oral Daily    atorvastatin  80 mg Oral Daily    calcium carbonate-vitamin D  1 tablet Oral Daily    citalopram  20 mg Oral Daily    metoprolol succinate  100 mg Oral Daily    pantoprazole  40 mg Oral QAM AC    sodium chloride flush  10 mL Intravenous 2 times per day       LORazepam, sodium chloride flush, acetaminophen, morphine **OR** morphine, ondansetron    LABS   CBC   Recent Labs     04/15/19  0429   WBC 8.5   HGB 12.6   HCT 38.6   MCV 90.3        BMP:   Lab Results   Component Value Date     04/15/2019    K 4.7 04/15/2019    CL 96 04/15/2019    CO2 35 04/15/2019    BUN 56 04/15/2019    LABALBU 3.2 01/30/2019    CREATININE 0.92 04/15/2019    CALCIUM 9.5 04/15/2019    GFRAA >60 04/15/2019    LABGLOM 59 04/15/2019     ABGs:  Lab Results   Component Value Date    PHART 7.404 01/24/2019    PO2ART 64.1 01/24/2019    BVU8MPA 46.1 01/24/2019      Lab Results   Component Value Date    MODE NOT REPORTED 01/24/2019     Ionized Calcium:  No results found for: IONCA  Magnesium:    Lab Results   Component Value Date    MG 1.8 12/27/2017     Phosphorus:  No results found for: PHOS     LIVER PROFILE No results for input(s): AST, ALT, LIPASE, BILIDIR, BILITOT, ALKPHOS in the last 72 hours. Invalid input(s): AMYLASE,  ALB  INR No results for input(s): INR in the last 72 hours.   PTT No results found for: APTT      RADIOLOGY     (See actual reports for details)    ASSESSMENT/PLAN     Patient Active Problem List   Diagnosis    GERD (gastroesophageal reflux disease)    Anxiety    Lipids abnormal    Hypertension    Heart disease    Atrial fibrillation (HCC)    Pulmonary fibrosis (HCC)    Pneumonia    Mixed hyperlipidemia    Acute cystitis without hematuria    Interstitial lung disease ([address]    [facility]rilobular emphysema [facility] [facility]    Pulmonary hypertension (HCC)    Chronic diastolic CHF (congestive heart failure) [facility] [facility]    Pulmonary embolus (HCC)    CAD (coronary artery disease), native coronary artery    Hypertensive heart disease without heart failure    Hypotension    Hypotension, unspecified    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

## 2019-04-15 NOTE — CARE COORDINATION
Notified, by Lily Baca, From [facility], that if pt. Does decide to return to home, they can provide, the [place] oxygen, that will most likely be needed. However, Lily Baca, has some concerns, about Family support, that would be needed at home to help w/ some of the equipment. Informed, that Pt. Wants to DC to the [facility]. Lily Baca states, Dinesh Quevedo can also follow her there, as well. \"

## 2019-04-15 NOTE — FLOWSHEET NOTE
04/14/19 1048   Encounter Summary   Services provided to: Patient   Referral/Consult From: Todd   Continue Visiting   (4/14/19V)   Volunteer Visit Yes   Complexity of Encounter Low   Length of Encounter 15 minutes   Routine   Type Follow up   Spiritual/Zoroastrianism   Type Spiritual support   Intervention Communion   Sacraments   Communion Patient received communion

## 2019-04-15 NOTE — FLOWSHEET NOTE
SC visit with patient and her son; patient stated she is waiting for her other son to arrive so they can decide discharge plan; patient talked about her discharge wishes and her goal of \"getting back home\"; patient talked about the illness and death of her last sibling, Reba Fabry, on April 8th; patient stated she had eight siblings and she is the last to survive; listening presence and support; patient comforted by prayer;     04/15/19 3893   Encounter Summary   Services provided to: Patient and family together   Referral/Consult From: [address] [place] [address] Visiting   (4/15/19)   Complexity of Encounter Moderate   Length of Encounter 15 minutes   Spiritual Assessment Completed Yes   Routine   Type Follow up   Grief and Life Adjustment   Type Palliative care;Grief and loss   Assessment Approachable;Grieving; Anxious; Hopeful;Coping   Intervention Active listening;Nurtured hope;Prayer;Sustaining presence/ Ministry of presence;Grief care; Discussed illness/injury and it's impact   Outcome Comfort;Expressed gratitude;Engaged in conversation;Expressed feelings/needs/concerns;Coping; Hopeful;Receptive

## 2019-04-15 NOTE — FLOWSHEET NOTE
04/15/19 1334   Encounter Summary   Services provided to: Patient   Referral/Consult From: Todd   Continue Visiting   (4/15/19V)   Volunteer Visit Yes   Complexity of Encounter Low   Length of Encounter 15 minutes   Routine   Type Follow up   Spiritual/Rastafarian   Type Spiritual support   Intervention [address] Patient received communion

## 2019-04-15 NOTE — PLAN OF CARE
Problem: Falls - Risk of:  Goal: Will remain free from falls  Description  Will remain free from falls  Outcome: Ongoing  Goal: Absence of physical injury  Description  Absence of physical injury  Outcome: Ongoing     Problem: Risk for Impaired Skin Integrity  Goal: Tissue integrity - skin and mucous membranes  Description  Structural intactness and normal physiological function of skin and  mucous membranes.   Outcome: Ongoing     Problem: Breathing Pattern - Ineffective:  Goal: Ability to achieve and maintain a regular respiratory rate will improve  Description  Ability to achieve and maintain a regular respiratory rate will improve  Outcome: Ongoing     Problem: Musculor/Skeletal Functional Status  Goal: Highest potential functional level  Outcome: Ongoing  Goal: Absence of falls  Outcome: Ongoing     Problem: Pain:  Goal: Pain level will decrease  Description  Pain level will decrease  Outcome: Ongoing  Goal: Control of acute pain  Description  Control of acute pain  Outcome: Ongoing  Goal: Control of chronic pain  Description  Control of chronic pain  Outcome: Ongoing     Problem: Nutrition  Goal: Optimal nutrition therapy  Outcome: Ongoing

## 2019-04-15 NOTE — PROGRESS NOTES
atrial fibrillation-rate controlled. Other medical problems as charted. REC/PLAN:    Slow improvement. We will continue on IV Lasix until the time of discharge and will switch it over to oral Lasix. We'll continue on low-dose aspirin, Eliquis 2.5 mg twice a day, Toprol- mg daily, IV/oral Lasix, potassium supplement. Not on ACE inhibitor or due to chronic hypertension and renal insufficiency as charted previously. Discussed with the Pulmonologist.    Overall very poor prognosis and increased risk for recurrent hospitalizations. Noted plans to discharge tomorrow . D/W martin Snowden) at bed side. Electronically signed by Taco Joseph MD, [facility]        PLEASE NOTE:  This progress note was completed using a voice transcription system. Every effort was made to ensure accuracy. However, inadvertent computerized transcription errors may be present.

## 2019-04-16 LAB
ABSOLUTE BANDS #: 0.09 K/UL (ref 0–1)
ABSOLUTE EOS #: 0 K/UL (ref 0–0.4)
ABSOLUTE IMMATURE GRANULOCYTE: ABNORMAL K/UL (ref 0–0.3)
ABSOLUTE LYMPH #: 0.34 K/UL (ref 1–4.8)
ABSOLUTE MONO #: 0.43 K/UL (ref 0.1–1.3)
ANION GAP SERPL CALCULATED.3IONS-SCNC: 8 MMOL/L (ref 9–17)
BANDS: 1 % (ref 0–10)
BASOPHILS # BLD: 0 % (ref 0–2)
BASOPHILS ABSOLUTE: 0 K/UL (ref 0–0.2)
BUN BLDV-MCNC: 55 MG/DL (ref 8–23)
BUN/CREAT BLD: ABNORMAL (ref 9–20)
CALCIUM SERPL-MCNC: 9.3 MG/DL (ref 8.6–10.4)
CHLORIDE BLD-SCNC: 93 MMOL/L (ref 98–107)
CO2: 36 MMOL/L (ref 20–31)
CREAT SERPL-MCNC: 0.8 MG/DL (ref 0.5–0.9)
DIFFERENTIAL TYPE: ABNORMAL
EOSINOPHILS RELATIVE PERCENT: 0 % (ref 0–4)
GFR AFRICAN AMERICAN: >60 ML/MIN
GFR NON-AFRICAN AMERICAN: >60 ML/MIN
GFR SERPL CREATININE-BSD FRML MDRD: ABNORMAL ML/MIN/{1.73_M2}
GFR SERPL CREATININE-BSD FRML MDRD: ABNORMAL ML/MIN/{1.73_M2}
GLUCOSE BLD-MCNC: 128 MG/DL (ref 70–99)
HCT VFR BLD CALC: 40.5 % (ref 36–46)
HEMOGLOBIN: 13.1 G/DL (ref 12–16)
IMMATURE GRANULOCYTES: ABNORMAL %
LYMPHOCYTES # BLD: 4 % (ref 24–44)
MCH RBC QN AUTO: 29.2 PG (ref 26–34)
MCHC RBC AUTO-ENTMCNC: 32.4 G/DL (ref 31–37)
MCV RBC AUTO: 90.2 FL (ref 80–100)
MONOCYTES # BLD: 5 % (ref 1–7)
MORPHOLOGY: ABNORMAL
NRBC AUTOMATED: ABNORMAL PER 100 WBC
PDW BLD-RTO: 20.2 % (ref 11.5–14.9)
PLATELET # BLD: 186 K/UL (ref 150–450)
PLATELET ESTIMATE: ABNORMAL
PMV BLD AUTO: 9.3 FL (ref 6–12)
POTASSIUM SERPL-SCNC: 4.3 MMOL/L (ref 3.7–5.3)
RBC # BLD: 4.49 M/UL (ref 4–5.2)
RBC # BLD: ABNORMAL 10*6/UL
SEG NEUTROPHILS: 90 % (ref 36–66)
SEGMENTED NEUTROPHILS ABSOLUTE COUNT: 7.64 K/UL (ref 1.3–9.1)
SODIUM BLD-SCNC: 137 MMOL/L (ref 135–144)
WBC # BLD: 8.5 K/UL (ref 3.5–11)
WBC # BLD: ABNORMAL 10*3/UL

## 2019-04-16 PROCEDURE — 6370000000 HC RX 637 (ALT 250 FOR IP): Performed by: STUDENT IN AN ORGANIZED HEALTH CARE EDUCATION/TRAINING PROGRAM

## 2019-04-16 PROCEDURE — 80048 BASIC METABOLIC PNL TOTAL CA: CPT

## 2019-04-16 PROCEDURE — 97530 THERAPEUTIC ACTIVITIES: CPT

## 2019-04-16 PROCEDURE — 94762 N-INVAS EAR/PLS OXIMTRY CONT: CPT

## 2019-04-16 PROCEDURE — 2580000003 HC RX 258: Performed by: STUDENT IN AN ORGANIZED HEALTH CARE EDUCATION/TRAINING PROGRAM

## 2019-04-16 PROCEDURE — 85025 COMPLETE CBC W/AUTO DIFF WBC: CPT

## 2019-04-16 PROCEDURE — 6370000000 HC RX 637 (ALT 250 FOR IP): Performed by: INTERNAL MEDICINE

## 2019-04-16 PROCEDURE — 2060000000 HC ICU INTERMEDIATE R&B

## 2019-04-16 PROCEDURE — 94640 AIRWAY INHALATION TREATMENT: CPT

## 2019-04-16 PROCEDURE — 94660 CPAP INITIATION&MGMT: CPT

## 2019-04-16 PROCEDURE — 2700000000 HC OXYGEN THERAPY PER DAY

## 2019-04-16 PROCEDURE — 97535 SELF CARE MNGMENT TRAINING: CPT

## 2019-04-16 PROCEDURE — 97116 GAIT TRAINING THERAPY: CPT

## 2019-04-16 PROCEDURE — 99232 SBSQ HOSP IP/OBS MODERATE 35: CPT | Performed by: FAMILY MEDICINE

## 2019-04-16 PROCEDURE — 6360000002 HC RX W HCPCS: Performed by: STUDENT IN AN ORGANIZED HEALTH CARE EDUCATION/TRAINING PROGRAM

## 2019-04-16 PROCEDURE — 36415 COLL VENOUS BLD VENIPUNCTURE: CPT

## 2019-04-16 PROCEDURE — 6360000002 HC RX W HCPCS: Performed by: INTERNAL MEDICINE

## 2019-04-16 RX ADMIN — IPRATROPIUM BROMIDE AND ALBUTEROL SULFATE 1 AMPULE: .5; 3 SOLUTION RESPIRATORY (INHALATION) at 08:03

## 2019-04-16 RX ADMIN — Medication 1 TABLET: at 08:59

## 2019-04-16 RX ADMIN — IPRATROPIUM BROMIDE AND ALBUTEROL SULFATE 1 AMPULE: .5; 3 SOLUTION RESPIRATORY (INHALATION) at 11:23

## 2019-04-16 RX ADMIN — PREDNISONE 40 MG: 20 TABLET ORAL at 08:57

## 2019-04-16 RX ADMIN — FUROSEMIDE 40 MG: 10 INJECTION, SOLUTION INTRAMUSCULAR; INTRAVENOUS at 08:58

## 2019-04-16 RX ADMIN — CITALOPRAM HYDROBROMIDE 20 MG: 20 TABLET ORAL at 08:55

## 2019-04-16 RX ADMIN — APIXABAN 2.5 MG: 2.5 TABLET, FILM COATED ORAL at 20:59

## 2019-04-16 RX ADMIN — ATORVASTATIN CALCIUM 80 MG: 80 TABLET, FILM COATED ORAL at 20:59

## 2019-04-16 RX ADMIN — FUROSEMIDE 40 MG: 10 INJECTION, SOLUTION INTRAMUSCULAR; INTRAVENOUS at 18:00

## 2019-04-16 RX ADMIN — ASPIRIN 81 MG: 81 TABLET, COATED ORAL at 08:55

## 2019-04-16 RX ADMIN — MORPHINE SULFATE 4 MG: 4 INJECTION INTRAVENOUS at 22:33

## 2019-04-16 RX ADMIN — PANTOPRAZOLE SODIUM 40 MG: 40 TABLET, DELAYED RELEASE ORAL at 05:18

## 2019-04-16 RX ADMIN — POTASSIUM CHLORIDE 20 MEQ: 1500 TABLET, EXTENDED RELEASE ORAL at 17:00

## 2019-04-16 RX ADMIN — Medication 10 ML: at 21:02

## 2019-04-16 RX ADMIN — IPRATROPIUM BROMIDE AND ALBUTEROL SULFATE 1 AMPULE: .5; 3 SOLUTION RESPIRATORY (INHALATION) at 15:32

## 2019-04-16 RX ADMIN — Medication 10 ML: at 09:02

## 2019-04-16 RX ADMIN — POTASSIUM CHLORIDE 20 MEQ: 1500 TABLET, EXTENDED RELEASE ORAL at 08:57

## 2019-04-16 RX ADMIN — APIXABAN 2.5 MG: 2.5 TABLET, FILM COATED ORAL at 08:57

## 2019-04-16 RX ADMIN — LORAZEPAM 0.5 MG: 0.5 TABLET ORAL at 22:33

## 2019-04-16 RX ADMIN — IPRATROPIUM BROMIDE AND ALBUTEROL SULFATE 1 AMPULE: .5; 3 SOLUTION RESPIRATORY (INHALATION) at 19:44

## 2019-04-16 ASSESSMENT — PAIN SCALES - GENERAL
PAINLEVEL_OUTOF10: 0

## 2019-04-16 NOTE — FLOWSHEET NOTE
Patient anxious and worried as she waits on the arrival of her children and the discharge plan;     04/16/19 1058   Encounter Summary   Services provided to: Patient   Referral/Consult From: Palliative Care   Support System Children   Continue Visiting   (4/16/19)   Complexity of Encounter Moderate   Length of Encounter 15 minutes   Spiritual Assessment Completed Yes   Routine   Type Follow up   Spiritual/Church   Type Spiritual support   Assessment Approachable; Anxious; Hopeful;Helplessness   Intervention Active listening;Prayer;Sustaining presence/ Ministry of presence;Nurtured hope;Discussed illness/injury and it's impact   Outcome Comfort;Expressed gratitude;Engaged in conversation;Expressed feelings/needs/concerns; Hopeful;Receptive

## 2019-04-16 NOTE — PLAN OF CARE
Problem: Falls - Risk of:  Goal: Will remain free from falls  Description  Will remain free from falls  4/16/2019 1725 by Paul Sharif RN  Outcome: Met This Shift  Note:   Patient remained free from falls. Call light within reach and bed in lowest position. Patient oriented to room and surroundings. 4/16/2019 0514 by Quinten Segovia RN  Outcome: Met This Shift  Goal: Absence of physical injury  Description  Absence of physical injury  Outcome: Met This Shift  Note:   Absence of physical injury met this shift. Problem: Risk for Impaired Skin Integrity  Goal: Tissue integrity - skin and mucous membranes  Description  Structural intactness and normal physiological function of skin and  mucous membranes. 4/16/2019 1725 by Paul Sharif RN  Outcome: Ongoing  Note:   Pt up to chair and up to commode as needed today. Pt kept dry and protective barrier applied to prevent any skin breakdowns. 4/16/2019 0514 by Quinten Segovia RN  Outcome: Met This Shift     Problem: Breathing Pattern - Ineffective:  Goal: Ability to achieve and maintain a regular respiratory rate will improve  Description  Ability to achieve and maintain a regular respiratory rate will improve  4/16/2019 1725 by Paul Sharif RN  Outcome: Ongoing  Note:   Pt on HFNC at 75 % FiO2 and 60 L. Pt with SpO2 between 88-96%. Pt does drop to 88-89% with exertion. 4/16/2019 0514 by Quinten Segovia RN  Outcome: Met This Shift     Problem: Nutrition  Goal: Optimal nutrition therapy  4/16/2019 1725 by Paul Sharif RN  Outcome: Ongoing  Note:   Pt tolerating diet without any nausea or vomiting.    4/16/2019 1642 by Nancy Alcaraz RD, LD  Outcome: Ongoing     Problem: Pain:  Goal: Pain level will decrease  Description  Pain level will decrease  4/16/2019 1725 by Paul Sharif RN  Outcome: Met This Shift  4/16/2019 0514 by Quinten Segovia RN  Outcome: Met This Shift

## 2019-04-16 NOTE — CARE COORDINATION
Marvr received a vmail from snf they now say now they cant take the because of her o2 level, writer informed rn staff and the pt. Marvr made a referral to [facility] to see if she meets ltac criteria.

## 2019-04-16 NOTE — PLAN OF CARE
Nutrition Problem: Altered nutrition-related lab values  Intervention: Food and/or Nutrient Delivery: Continue current diet, Start ONS  Nutritional Goals: Adequate nutrition intake or provision

## 2019-04-16 NOTE — FLOWSHEET NOTE
04/16/19 1319   Encounter Summary   Services provided to: Patient   Referral/Consult From: Todd   Continue Visiting   (4/16/19V)   Volunteer Visit Yes   Complexity of Encounter Low   Length of Encounter 15 minutes   Routine   Type Follow up   Spiritual/Oriental orthodox   Type Spiritual support   Intervention [address] Patient received communion

## 2019-04-16 NOTE — PROGRESS NOTES
[address]    INPATIENT OCCUPATIONAL THERAPY  PROGRESS NOTE  Date: 2019  Patient Name: William Zhang      Room:   MRN: [id number]    : 1940  (78 y.o.) Gender: female     Discharge Recommendations:  [address], Continue to assess pending progress(w/pulmonary rehab )       Referring Practitioner: Dr. Soy Hernandez  Diagnosis: CHF  General  Chart Reviewed: Yes  Patient assessed for rehabilitation services?: Yes  Additional Pertinent Hx: Pulmonary fibrosis, oxygen dependent  Family / Caregiver Present: No  Referring Practitioner: Dr. Soy Hernandez  Diagnosis: CHF    Restrictions  Restrictions/Precautions: Fall Risk  Implants present? : Metal implants(strnal wires from CABG)  Required Braces or Orthoses?: No      Subjective  Subjective: Pt reports  Comments: Alert and oriented. In bedside chair upon arrival. Wearing HighFlo throughout treatment. Pt O2 89-93 during functional LBD tasks this date. Pt notes she may d/c to [facility] tonight. Patient Currently in Pain: Denies  Overall Orientation Status: Within Functional Limits     Pain Assessment  Response to Pain Intervention: Patient Satisfied    Objective  Cognition  Overall Cognitive Status: WFL     Balance  Sitting Balance: Independent  Standing Balance: Contact guard assistance(per pt report)      ADL  Feeding: Independent  Grooming: Setup; Increased time to complete  UE Bathing: Setup; Increased time to complete  LE Bathing: Minimal assistance  UE Dressing: Setup; Increased time to complete  LE Dressing: Minimal Assistance  Toileting: Dependent/Total  Additional Comments: Reacher and Sock aid training for footies completed this date while seated in bedside chair. Leanne to doff c reacher c VC for tech and use of fxl knee movements to A c ease. Writer provided setup of device and V tech, pt donned c SBA and VC for tech for fxl use of sock aid handles. , pt able to complete c increased time and VC.  Reviewed wide sock aid vc stnadard sock aid and difficulties donning TEDs c standard size. Demo provided for EZ slide use to increase ease c ADL tasks. Pt V understanding of use and ability to refer to directions. This writer encouraged pt to continue to practice fxl use of sock aid and EZ slide at next rehab facility. Transfers  Sit to stand: Contact guard assistance  Stand to sit: Contact guard assistance  Transfer Comments: w/RW  Toilet Transfers  Toilet Transfers Comments: educated on importance of bladder mgt, fxl use of bladder, writer encouraged BSC and discouraged external cath as appropriate. Pt V understanding of bladder protection. Additional Activities: pt fxl lowered and raised leg rests of recliner for fxl RUE  strengthening. Increased time required. ADditioanlly educated on EC/WS c ADLs and IADLs, handouts provided, pt agreeable to review. Vision  Patient Visual Report: No visual complaint reported. Assessment  Performance deficits / Impairments: Decreased functional mobility ; Decreased ADL status; Decreased endurance;Decreased balance;Decreased high-level IADLs  Assessment: resume OT POC, O2 sats WNL during LBD c AE   Prognosis: Good  Discharge Recommendations: Subacute/Skilled Nursing Facility;Continue to assess pending progress(w/pulmonary rehab )  Activity Tolerance: Treatment limited secondary to medical complications (free text)(req multiple RB during minimal exertion)  Safety Devices in place: Yes  Type of devices: Patient at risk for falls;Gait belt;Left in bed;Call light within reach;Nurse notified  Equipment Recommendations  Equipment Needed: Yes  Grab bars: for shower transfers  Reacher: yes  Sock Aid: yes  Long-handled Shoehorn: yes  Other: long handled sponge          Patient Education:  Patient Education: OT POC, EC/WS, AE, breathing tech for SOB mgt,   Learner:patient  Method: demonstration, explanation and handout       Outcome: needs reinforcement     Plan  Safety Devices  Safety Devices in place: Yes  Type of devices: Patient at risk for falls, Gait belt, Left in bed, Call light within reach, Nurse notified  Plan  Times per week: 5-7  Times per day: Daily  Current Treatment Recommendations: Balance Training, Functional Mobility Training, Endurance Training, Safety Education & Training, Patient/Caregiver Education & Training, Equipment Evaluation, Education, & procurement, Self-Care / ADL      Goals  Short term goals  Time Frame for Short term goals: By Discharge  Short term goal 1: Pt will actively participate in self-care routine while sitting EOB or in chair and maintaining O2 levels 88%+. Short term goal 2: Pt will V/D good understanding of AE/modified techniques for LB ADL's and complete tasks with Min A. Short term goal 3: Pt will complete toilet transfer and toileting with SBA and Good safety using appropriate DME. Short term goal 4: Pt will V/D good understanding of EC/WS that she can utilize during daily routines. Short term goal 5: Pt will actively participate in 15-20 minutes of therapeutic exercise/activity to promote increased independence and safety with self-care and mobility.     OT Individual Minutes  Time In: 7325  Time Out: 7819  Minutes: 45      Electronically signed by MONCHO Ceja on 4/16/19 at 2:39 PM

## 2019-04-16 NOTE — PROGRESS NOTES
Pulmonary Progress Note  [facility]      Patient - Marilee Hanson,  Age - 78 y.o.    - 1940      Room Number -    N -  [id number]   [facility]  Date of Admission -  2019  4:16 PM    Consulting 838 Loly Sanchez MD  Primary Care Physician - Ginger Burch MD     SUBJECTIVE   No distress  On 75% high flow--- 60 liters  OBJECTIVE   VITALS    height is 5' 6\" (1.676 m) and weight is 216 lb 0.8 oz (98 kg). Her axillary temperature is 97.6 °F (36.4 °C). Her blood pressure is 117/70 and her pulse is 83. Her respiration is 20 and oxygen saturation is 93%. Body mass index is 34.87 kg/m². Temperature Range: Temp: 97.6 °F (36.4 °C) Temp  Av.6 °F (36.4 °C)  Min: 97.3 °F (36.3 °C)  Max: 98.4 °F (36.9 °C)  BP Range:  Systolic (00XCU), AHW:398 , Min:96 , DPI:633     Diastolic (02MBU), TGJ:51, Min:45, Max:96    Pulse Range: Pulse  Av.9  Min: 63  Max: 88  Respiration Range: Resp  Av.2  Min: 12  Max: 29  Current Pulse Ox[de-identified]  SpO2: 93 %  24HR Pulse Ox Range:  SpO2  Av.7 %  Min: 89 %  Max: 96 %  Oxygen Amount and Delivery: O2 Flow Rate (L/min): 60 L/min    Wt Readings from Last 3 Encounters:   19 216 lb 0.8 oz (98 kg)   19 194 lb (88 kg)   19 189 lb (85.7 kg)       I/O (24 Hours)    Intake/Output Summary (Last 24 hours) at 2019 1342  Last data filed at 2019 0800  Gross per 24 hour   Intake 240 ml   Output 1250 ml   Net -1010 ml       EXAM     General Appearance  Awake, alert, oriented, in no acute distress  HEENT - normocephalic, atraumatic. Neck - Supple,  trachea midline   Lungs - no distress  Heart Exam:PMI normal. No lifts, heaves, or thrills. RRR. No murmurs, clicks, gallops, or rubs  Abdomen Exam: Abdomen soft, non-tender.  BS normal.  Extremity Exam: no cyanosis    MEDS      predniSONE  40 mg Oral Daily    furosemide  40 mg Intravenous BID    ipratropium-albuterol  1 ampule Inhalation 4x daily    potassium chloride  20 mEq Oral BID WC    apixaban  2.5 mg Oral BID    aspirin  81 mg Oral Daily    atorvastatin  80 mg Oral Daily    calcium carbonate-vitamin D  1 tablet Oral Daily    citalopram  20 mg Oral Daily    metoprolol succinate  100 mg Oral Daily    pantoprazole  40 mg Oral QAM AC    sodium chloride flush  10 mL Intravenous 2 times per day       LORazepam, sodium chloride flush, acetaminophen, morphine **OR** morphine, ondansetron    LABS   CBC   Recent Labs     04/16/19  0428   WBC 8.5   HGB 13.1   HCT 40.5   MCV 90.2        BMP:   Lab Results   Component Value Date     04/16/2019    K 4.3 04/16/2019    CL 93 04/16/2019    CO2 36 04/16/2019    BUN 55 04/16/2019    LABALBU 3.2 01/30/2019    CREATININE 0.80 04/16/2019    CALCIUM 9.3 04/16/2019    GFRAA >60 04/16/2019    LABGLOM >60 04/16/2019     ABGs:  Lab Results   Component Value Date    PHART 7.404 01/24/2019    PO2ART 64.1 01/24/2019    HMA4PJC 46.1 01/24/2019      Lab Results   Component Value Date    MODE NOT REPORTED 01/24/2019     Ionized Calcium:  No results found for: IONCA  Magnesium:    Lab Results   Component Value Date    MG 1.8 12/27/2017     Phosphorus:  No results found for: PHOS     LIVER PROFILE No results for input(s): AST, ALT, LIPASE, BILIDIR, BILITOT, ALKPHOS in the last 72 hours. Invalid input(s): AMYLASE,  ALB  INR No results for input(s): INR in the last 72 hours.   PTT No results found for: APTT      RADIOLOGY     (See actual reports for details)    ASSESSMENT/PLAN     Patient Active Problem List   Diagnosis    GERD (gastroesophageal reflux disease)    Anxiety    Lipids abnormal    Hypertension    Heart disease    Atrial fibrillation (HCC)    Pulmonary fibrosis (HCC)    Pneumonia    Mixed hyperlipidemia    Acute cystitis without hematuria    Interstitial lung disease ([address]    Centrilobular emphysema ([address]    Pulmonary hypertension (HCC)    Chronic diastolic CHF (congestive heart failure) (HCC)    Pulmonary embolus (HCC)    CAD (coronary artery disease), native coronary artery    Hypertensive heart disease without heart failure    Hypotension    Hypotension, unspecified    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    Iatrogenic hypotension    Lung nodule, solitary    S/P CABG x 4    Acute on chronic respiratory failure with hypoxemia (HCC)    Hypoxia    Moderate malnutrition (HCC)    CHF, left ventricular (HCC)    Acute on chronic diastolic CHF (congestive heart failure) (HCC)     Severe chronic resp failure  pulm fibrosis  On high flow  On duoneb treatments  Non eliquis  She might be as good as she gets  SNF [facility] can provide high flow care  Pt OK with SNF  Ford Mancuso OK with transfer  Will proceed --- worked with charge RN    Long term prognosis poor    Electronically signed by Suma Ramirez MD on 4/16/2019 at 1:42 PM

## 2019-04-16 NOTE — PROGRESS NOTES
Physical Therapy  Facility/Department: [facility] ICU  Daily Treatment Note  NAME: Magaly Davis  : 1940  MRN: [id number]    Date of Service: 2019    Discharge Recommendations:  [address]        Patient Diagnosis(es): The encounter diagnosis was Congestive heart failure, unspecified HF chronicity, unspecified heart failure type ([address] has a past medical history of Acute cystitis without hematuria, Anxiety, Atrial fibrillation ([address] Orantes's esophagus, Centrilobular emphysema (HCC), Chronic diastolic CHF (congestive heart failure) ([address] Chronic hypoxemic respiratory failure ([address] GERD (gastroesophageal reflux disease), Heart disease, Hypertension, Hypotension, unspecified, Iatrogenic hypotension, Interstitial lung disease ([address] Lipids abnormal, Mixed hyperlipidemia, LIANET on CPAP, Pneumonia, Pulmonary embolus ([address] Pulmonary fibrosis ([address] Pulmonary hypertension ([address] S/P CABG x 4, and UIP (usual interstitial pneumonitis) ([address] has a past surgical history that includes Breast biopsy (); Cardiac surgery (; ); Cholecystectomy (); Cardioversion; and Bypass Graft (N/A). Restrictions  Restrictions/Precautions  Restrictions/Precautions: Fall Risk  Required Braces or Orthoses?: No  Implants present? : Metal implants(Sternal wires from CABG)  Subjective   General  Response To Previous Treatment: Patient with no complaints from previous session. Family / Caregiver Present: No  Referring Practitioner: Dr. Geraldine Alva  Subjective  Subjective: Pt agreeable to get from chair to bed this date. Pt is limited by fatigue. General Comment  Comments: ARA Lemos ok's pt for PT.    Pain Screening  Patient Currently in Pain: Denies  Vital Signs  Patient Currently in Pain: Denies  Oxygen Therapy  SpO2: 95 %  Pulse Oximeter Device Mode: Continuous  Pulse Oximeter Device Location: Finger  O2 Device: Heated high flow cannula  FiO2 : 75 %  O2 Flow Rate (L/min): 60 L/min 20-30' w/ CGA x 1 and O2  Short term goal 5: pt to demonstrate good balance using w walker  Short term goal 6: pt to demonstrate good technique for LE strengthening exercises and energy conservation techniques  Short term goal 7: pt to tolerate 1/2 hour of therapuetic exercise keeping O2 sats 90% or higher  Patient Goals   Patient goals : walk    Plan    Plan  Times per week: 5-7 treatments/ week  Times per day: (5-7 treatments/ week)  Specific instructions for Next Treatment: 4-8-19 SNF W/ PULMONARY REHAB; gait w/ w walker 5' x 3 forward and back w/ CGA x 1 w/ O2 at 6 L, watch O2 sats  Current Treatment Recommendations: Strengthening, Transfer Training, Endurance Training, Patient/Caregiver Education & Training, Balance Training, Gait Training, Functional Mobility Training, Stair training, Safety Education & Training  Plan Comment: to continue PT per POC  Safety Devices  Type of devices:  All fall risk precautions in place, Bed alarm in place, Call light within reach, Gait belt, Patient at risk for falls, Left in bed, Nurse notified  Restraints  Initially in place: No     Therapy Time   Individual Concurrent Group Co-treatment   Time In 1538         Time Out 1602         Minutes 30 [address] [place] [address]

## 2019-04-16 NOTE — PROGRESS NOTES
FIO2 too high for this SNF--- just found out    Will try [facility]--- discharge planning underway.     Sherie Breen MD

## 2019-04-16 NOTE — PROGRESS NOTES
[facility] PHYSICIANS CARDIOLOGY Progress Note    4/16/2019 7:16 AM      Subjective:  Ms. Landy Buckner  Stated that she slept better and feeling better. Still on high-flow oxygen. Denies any anginal chest pain.            LABS:     Recent Results (from the past 24 hour(s))   Basic Metabolic Prof    Collection Time: 04/16/19  4:28 AM   Result Value Ref Range    Glucose 128 (H) 70 - 99 mg/dL    BUN 55 (H) 8 - 23 mg/dL    CREATININE 0.80 0.50 - 0.90 mg/dL    Bun/Cre Ratio NOT REPORTED 9 - 20    Calcium 9.3 8.6 - 10.4 mg/dL    Sodium 137 135 - 144 mmol/L    Potassium 4.3 3.7 - 5.3 mmol/L    Chloride 93 (L) 98 - 107 mmol/L    CO2 36 (H) 20 - 31 mmol/L    Anion Gap 8 (L) 9 - 17 mmol/L    GFR Non-African American >60 >60 mL/min    GFR African American >60 >60 mL/min    GFR Comment          GFR Staging NOT REPORTED    CBC with DIFF    Collection Time: 04/16/19  4:28 AM   Result Value Ref Range    WBC 8.5 3.5 - 11.0 k/uL    RBC 4.49 4.0 - 5.2 m/uL    Hemoglobin 13.1 12.0 - 16.0 g/dL    Hematocrit 40.5 36 - 46 %    MCV 90.2 80 - 100 fL    MCH 29.2 26 - 34 pg    MCHC 32.4 31 - 37 g/dL    RDW 20.2 (H) 11.5 - 14.9 %    Platelets 441 092 - 013 k/uL    MPV 9.3 6.0 - 12.0 fL    NRBC Automated NOT REPORTED per 100 WBC    Differential Type NOT REPORTED     Immature Granulocytes NOT REPORTED 0 %    Absolute Immature Granulocyte NOT REPORTED 0.00 - 0.30 k/uL    WBC Morphology NOT REPORTED     RBC Morphology NOT REPORTED     Platelet Estimate NOT REPORTED     Seg Neutrophils 90 (H) 36 - 66 %    Lymphocytes 4 (L) 24 - 44 %    Monocytes 5 1 - 7 %    Eosinophils % 0 0 - 4 %    Basophils 0 0 - 2 %    Bands 1 0 - 10 %    Segs Absolute 7.64 1.3 - 9.1 k/uL    Absolute Lymph # 0.34 (L) 1.0 - 4.8 k/uL    Absolute Mono # 0.43 0.1 - 1.3 k/uL    Absolute Eos # 0.00 0.0 - 0.4 k/uL    Basophils # 0.00 0.0 - 0.2 k/uL    Absolute Bands # 0.09 0.0 - 1.0 k/uL    Morphology HYPOCHROMIA PRESENT     Morphology ANISOCYTOSIS PRESENT     Morphology FEW controlled.     Other medical problems as charted. REC/PLAN:     Status quo. Suggest maintaining on IV Lasix until the time of discharge when we can switch it over to oral Lasix. Charge nurse is not aware of any discharge today, when I spoke to her. No family members available at bedside. Will follow. Electronically signed by Nuria Venegas MD, [facility]        PLEASE NOTE:  This progress note was completed using a voice transcription system. Every effort was made to ensure accuracy. However, inadvertent computerized transcription errors may be present.

## 2019-04-16 NOTE — PLAN OF CARE
Problem: Falls - Risk of:  Goal: Will remain free from falls  Description  Will remain free from falls  4/16/2019 0514 by Tiago Garcia RN  Outcome: Met This Shift     Problem: Risk for Impaired Skin Integrity  Goal: Tissue integrity - skin and mucous membranes  Description  Structural intactness and normal physiological function of skin and  mucous membranes.   4/16/2019 0514 by Tiago Garcia RN  Outcome: Met This Shift     Problem: Breathing Pattern - Ineffective:  Goal: Ability to achieve and maintain a regular respiratory rate will improve  Description  Ability to achieve and maintain a regular respiratory rate will improve  4/16/2019 0514 by Tiago Garcia RN  Outcome: Met This Shift     Problem: Pain:  Goal: Pain level will decrease  Description  Pain level will decrease  4/16/2019 0514 by Tiago Garcia RN  Outcome: Met This Shift

## 2019-04-16 NOTE — PROGRESS NOTES
Medications   Medication Dose Route Frequency Provider Last Rate Last Dose    predniSONE (DELTASONE) tablet 40 mg  40 mg Oral Daily Catherine Muñoz MD   40 mg at 04/15/19 0834    furosemide (LASIX) injection 40 mg  40 mg Intravenous BID Carol Payan MD   40 mg at 04/15/19 1755    ipratropium-albuterol (DUONEB) nebulizer solution 1 ampule  1 ampule Inhalation 4x daily Pedro Carrington MD   1 ampule at 04/15/19 2041    potassium chloride (KLOR-CON M) extended release tablet 20 mEq  20 mEq Oral BID  Shiela Phipps MD   20 mEq at 04/15/19 1755    apixaban (ELIQUIS) tablet 2.5 mg  2.5 mg Oral BID Nia Zelaya MD   2.5 mg at 04/15/19 2035    aspirin EC tablet 81 mg  81 mg Oral Daily Nia Zelaya MD   81 mg at 04/15/19 0834    atorvastatin (LIPITOR) tablet 80 mg  80 mg Oral Daily Nia Zleaya MD   80 mg at 04/15/19 2035    calcium carbonate-vitamin D (CALTRATE) 600-400 MG-UNIT per tab 1 tablet  1 tablet Oral Daily Nia Zelaya MD   1 tablet at 04/15/19 0835    citalopram (CELEXA) tablet 20 mg  20 mg Oral Daily Nia Zelaya MD   20 mg at 04/15/19 0834    LORazepam (ATIVAN) tablet 0.5 mg  0.5 mg Oral Q8H PRN Nia Zelaya MD   0.5 mg at 04/15/19 2150    metoprolol succinate (TOPROL XL) extended release tablet 100 mg  100 mg Oral Daily Nia Zelaya MD   100 mg at 04/15/19 0834    pantoprazole (PROTONIX) tablet 40 mg  40 mg Oral QAM AC Nia Zelaya MD   40 mg at 04/16/19 0518    sodium chloride flush 0.9 % injection 10 mL  10 mL Intravenous 2 times per day Nia Zelaya MD   10 mL at 04/15/19 2035    sodium chloride flush 0.9 % injection 10 mL  10 mL Intravenous PRN Nia Zelaya MD   10 mL at 04/14/19 1810    acetaminophen (TYLENOL) tablet 650 mg  650 mg Oral Q4H PRN Nia Zelaya MD   650 mg at 04/14/19 1145    morphine (PF) injection 2 mg  2 mg Intravenous Q2H PRN Nia Zelaya MD   2 mg at 04/15/19 0030    Or    morphine sulfate (PF) injection 4 mg  4 mg Intravenous Q2H PRN Aquiles Roland MD   4 mg at 04/15/19 2150    ondansetron [facility] injection 4 mg  4 mg Intravenous Q8H PRN Aquiles Roland MD           Intake/Output Summary (Last 24 hours) at 4/16/2019 0546  Last data filed at 4/16/2019 0521  Gross per 24 hour   Intake 240 ml   Output 1750 ml   Net -1510 ml     Recent Results (from the past 24 hour(s))   Basic Metabolic Prof    Collection Time: 04/16/19  4:28 AM   Result Value Ref Range    Glucose 128 (H) 70 - 99 mg/dL    BUN 55 (H) 8 - 23 mg/dL    CREATININE 0.80 0.50 - 0.90 mg/dL    Bun/Cre Ratio NOT REPORTED 9 - 20    Calcium 9.3 8.6 - 10.4 mg/dL    Sodium 137 135 - 144 mmol/L    Potassium 4.3 3.7 - 5.3 mmol/L    Chloride 93 (L) 98 - 107 mmol/L    CO2 36 (H) 20 - 31 mmol/L    Anion Gap 8 (L) 9 - 17 mmol/L    GFR Non-African American >60 >60 mL/min    GFR African American >60 >60 mL/min    GFR Comment          GFR Staging NOT REPORTED    CBC with DIFF    Collection Time: 04/16/19  4:28 AM   Result Value Ref Range    WBC 8.5 3.5 - 11.0 k/uL    RBC 4.49 4.0 - 5.2 m/uL    Hemoglobin 13.1 12.0 - 16.0 g/dL    Hematocrit 40.5 36 - 46 %    MCV 90.2 80 - 100 fL    MCH 29.2 26 - 34 pg    MCHC 32.4 31 - 37 g/dL    RDW 20.2 (H) 11.5 - 14.9 %    Platelets 841 168 - 242 k/uL    MPV 9.3 6.0 - 12.0 fL    NRBC Automated NOT REPORTED per 100 WBC    Differential Type NOT REPORTED     Seg Neutrophils PENDING %    Lymphocytes PENDING %    Monocytes PENDING %    Eosinophils % PENDING %    Basophils PENDING %    Immature Granulocytes NOT REPORTED 0 %    Segs Absolute PENDING k/uL    Absolute Lymph # PENDING k/uL    Absolute Mono # PENDING k/uL    Absolute Eos # PENDING k/uL    Basophils # PENDING 0.0 - 0.2 k/uL    Absolute Immature Granulocyte NOT REPORTED 0.00 - 0.30 k/uL    WBC Morphology NOT REPORTED     RBC Morphology NOT REPORTED     Platelet Estimate NOT REPORTED      -----------------------------------------------------------------  RAD:  CORKYG:  Micro:     Assessment & Plan:    Patient Active Problem List:     GERD (gastroesophageal reflux disease)     Anxiety     Lipids abnormal     Hypertension     Heart disease     Atrial fibrillation (HCC)     Pulmonary fibrosis (HCC)     Pneumonia     Mixed hyperlipidemia     Acute cystitis without hematuria     Interstitial lung disease (HCC)     Centrilobular emphysema (HCC)     Pulmonary hypertension (HCC)     Chronic diastolic CHF (congestive heart failure) (HCC)     Pulmonary embolus (HCC)     CAD (coronary artery disease), native coronary artery     Hypertensive heart disease without heart failure     Hypotension     Hypotension, unspecified     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 Chronic CHF - at baseline. D/C planning ongoing - okay to D/C to SNF. Continue current treatments. Complete orders per chart.     See orders   Disposition:    Electronically signed by Gay Perez MD on 4/16/2019 at 5:46 AM

## 2019-04-16 NOTE — PROGRESS NOTES
Nutrition Assessment    Type and Reason for Visit: Reassess    Nutrition Recommendations: Provide Ensure High Protein twice daily to boost intake. Nutrition Assessment: Pt deteriorating from a nutritional standpoint, RN states since the weekend she has become very weak and not eating much. Malnutrition Assessment:  · Malnutrition Status: No malnutrition  · Context: Acute illness or injury    Nutrition Risk Level: High    Nutrient Needs:  · Estimated Daily Total Kcal: 16-17 kcal   · Estimated Daily Protein (g): 76-88 gm pro based on 1.3-1.5 gm/kg IBW    Nutrition Diagnosis:   · Problem: Altered nutrition-related lab values  · Etiology: related to Cardiac dysfunction, Other (Comment)(Pulmonary fibrosis)     Signs and symptoms:  as evidenced by Lab values, Localized or generalized fluid accumulation    Objective Information:  · Nutrition-Focused Physical Findings: Edema: +2 pitting RLE, +3 pitting LLE, +2 sacral  · Wound Type: None  · Current Nutrition Therapies:  · Oral Diet Orders: Cardiac   · Oral Diet intake: 26-50%  · Anthropometric Measures:  · Ht: 5' 6\" (167.6 cm)   · Current Body Wt: 216 lb (98 kg)  · Admission Body Wt: 223 lb (101.2 kg)  · Ideal Body Wt: 130 lb (59 kg), % Ideal Body 172%  · BMI Classification: BMI 30.0 - 34.9 Obese Class I    Nutrition Interventions:   Continue current diet, Start ONS  Continued Inpatient Monitoring, Education Not Indicated    Nutrition Evaluation:   · Evaluation: No progress toward goals   · Goals: Adequate nutrition intake or provision    · Monitoring: Meal Intake, Supplement Intake, Weight, Pertinent Labs, Monitor Hemodynamic Status, I&O    Natalie Goodman R.D., L.NINO.   Clinical Dietitian  Office: [phone]

## 2019-04-17 LAB
ABSOLUTE EOS #: 0 K/UL (ref 0–0.4)
ABSOLUTE IMMATURE GRANULOCYTE: ABNORMAL K/UL (ref 0–0.3)
ABSOLUTE LYMPH #: 0.97 K/UL (ref 1–4.8)
ABSOLUTE MONO #: 0.11 K/UL (ref 0.1–1.3)
ANION GAP SERPL CALCULATED.3IONS-SCNC: 8 MMOL/L (ref 9–17)
BASOPHILS # BLD: 0 % (ref 0–2)
BASOPHILS ABSOLUTE: 0 K/UL (ref 0–0.2)
BUN BLDV-MCNC: 55 MG/DL (ref 8–23)
BUN/CREAT BLD: ABNORMAL (ref 9–20)
CALCIUM SERPL-MCNC: 9.2 MG/DL (ref 8.6–10.4)
CHLORIDE BLD-SCNC: 92 MMOL/L (ref 98–107)
CO2: 35 MMOL/L (ref 20–31)
CREAT SERPL-MCNC: 0.73 MG/DL (ref 0.5–0.9)
DIFFERENTIAL TYPE: ABNORMAL
EOSINOPHILS RELATIVE PERCENT: 0 % (ref 0–4)
GFR AFRICAN AMERICAN: >60 ML/MIN
GFR NON-AFRICAN AMERICAN: >60 ML/MIN
GFR SERPL CREATININE-BSD FRML MDRD: ABNORMAL ML/MIN/{1.73_M2}
GFR SERPL CREATININE-BSD FRML MDRD: ABNORMAL ML/MIN/{1.73_M2}
GLUCOSE BLD-MCNC: 115 MG/DL (ref 70–99)
HCT VFR BLD CALC: 39.4 % (ref 36–46)
HEMOGLOBIN: 12.7 G/DL (ref 12–16)
IMMATURE GRANULOCYTES: ABNORMAL %
LYMPHOCYTES # BLD: 9 % (ref 24–44)
MCH RBC QN AUTO: 29 PG (ref 26–34)
MCHC RBC AUTO-ENTMCNC: 32.1 G/DL (ref 31–37)
MCV RBC AUTO: 90.2 FL (ref 80–100)
MONOCYTES # BLD: 1 % (ref 1–7)
MORPHOLOGY: ABNORMAL
NRBC AUTOMATED: ABNORMAL PER 100 WBC
PDW BLD-RTO: 20.1 % (ref 11.5–14.9)
PLATELET # BLD: 188 K/UL (ref 150–450)
PLATELET ESTIMATE: ABNORMAL
PMV BLD AUTO: 9.5 FL (ref 6–12)
POTASSIUM SERPL-SCNC: 4.3 MMOL/L (ref 3.7–5.3)
RBC # BLD: 4.37 M/UL (ref 4–5.2)
RBC # BLD: ABNORMAL 10*6/UL
SEG NEUTROPHILS: 90 % (ref 36–66)
SEGMENTED NEUTROPHILS ABSOLUTE COUNT: 9.72 K/UL (ref 1.3–9.1)
SODIUM BLD-SCNC: 135 MMOL/L (ref 135–144)
WBC # BLD: 10.8 K/UL (ref 3.5–11)
WBC # BLD: ABNORMAL 10*3/UL

## 2019-04-17 PROCEDURE — 6370000000 HC RX 637 (ALT 250 FOR IP): Performed by: INTERNAL MEDICINE

## 2019-04-17 PROCEDURE — 99232 SBSQ HOSP IP/OBS MODERATE 35: CPT | Performed by: FAMILY MEDICINE

## 2019-04-17 PROCEDURE — 6370000000 HC RX 637 (ALT 250 FOR IP): Performed by: STUDENT IN AN ORGANIZED HEALTH CARE EDUCATION/TRAINING PROGRAM

## 2019-04-17 PROCEDURE — 2700000000 HC OXYGEN THERAPY PER DAY

## 2019-04-17 PROCEDURE — 94761 N-INVAS EAR/PLS OXIMETRY MLT: CPT

## 2019-04-17 PROCEDURE — 85025 COMPLETE CBC W/AUTO DIFF WBC: CPT

## 2019-04-17 PROCEDURE — 2060000000 HC ICU INTERMEDIATE R&B

## 2019-04-17 PROCEDURE — 2580000003 HC RX 258: Performed by: STUDENT IN AN ORGANIZED HEALTH CARE EDUCATION/TRAINING PROGRAM

## 2019-04-17 PROCEDURE — 97110 THERAPEUTIC EXERCISES: CPT

## 2019-04-17 PROCEDURE — 94762 N-INVAS EAR/PLS OXIMTRY CONT: CPT

## 2019-04-17 PROCEDURE — 6360000002 HC RX W HCPCS: Performed by: STUDENT IN AN ORGANIZED HEALTH CARE EDUCATION/TRAINING PROGRAM

## 2019-04-17 PROCEDURE — 80048 BASIC METABOLIC PNL TOTAL CA: CPT

## 2019-04-17 PROCEDURE — 97116 GAIT TRAINING THERAPY: CPT

## 2019-04-17 PROCEDURE — 94640 AIRWAY INHALATION TREATMENT: CPT

## 2019-04-17 PROCEDURE — 6360000002 HC RX W HCPCS: Performed by: INTERNAL MEDICINE

## 2019-04-17 PROCEDURE — 36415 COLL VENOUS BLD VENIPUNCTURE: CPT

## 2019-04-17 PROCEDURE — 94660 CPAP INITIATION&MGMT: CPT

## 2019-04-17 RX ORDER — CALCIUM CARBONATE/VITAMIN D3 600 MG-10
1 TABLET ORAL DAILY
Status: DISCONTINUED | OUTPATIENT
Start: 2019-04-17 | End: 2019-04-19 | Stop reason: HOSPADM

## 2019-04-17 RX ADMIN — LORAZEPAM 0.5 MG: 0.5 TABLET ORAL at 23:11

## 2019-04-17 RX ADMIN — Medication 10 ML: at 21:24

## 2019-04-17 RX ADMIN — ATORVASTATIN CALCIUM 80 MG: 80 TABLET, FILM COATED ORAL at 21:24

## 2019-04-17 RX ADMIN — METOPROLOL SUCCINATE 100 MG: 100 TABLET, EXTENDED RELEASE ORAL at 08:30

## 2019-04-17 RX ADMIN — MORPHINE SULFATE 4 MG: 4 INJECTION INTRAVENOUS at 23:11

## 2019-04-17 RX ADMIN — IPRATROPIUM BROMIDE AND ALBUTEROL SULFATE 1 AMPULE: .5; 3 SOLUTION RESPIRATORY (INHALATION) at 07:07

## 2019-04-17 RX ADMIN — ASPIRIN 81 MG: 81 TABLET, COATED ORAL at 08:30

## 2019-04-17 RX ADMIN — PREDNISONE 40 MG: 20 TABLET ORAL at 08:30

## 2019-04-17 RX ADMIN — CITALOPRAM HYDROBROMIDE 20 MG: 20 TABLET ORAL at 08:30

## 2019-04-17 RX ADMIN — POTASSIUM CHLORIDE 20 MEQ: 1500 TABLET, EXTENDED RELEASE ORAL at 16:20

## 2019-04-17 RX ADMIN — IPRATROPIUM BROMIDE AND ALBUTEROL SULFATE 1 AMPULE: .5; 3 SOLUTION RESPIRATORY (INHALATION) at 10:35

## 2019-04-17 RX ADMIN — IPRATROPIUM BROMIDE AND ALBUTEROL SULFATE 1 AMPULE: .5; 3 SOLUTION RESPIRATORY (INHALATION) at 14:47

## 2019-04-17 RX ADMIN — IPRATROPIUM BROMIDE AND ALBUTEROL SULFATE 1 AMPULE: .5; 3 SOLUTION RESPIRATORY (INHALATION) at 18:46

## 2019-04-17 RX ADMIN — POTASSIUM CHLORIDE 20 MEQ: 1500 TABLET, EXTENDED RELEASE ORAL at 08:30

## 2019-04-17 RX ADMIN — APIXABAN 2.5 MG: 2.5 TABLET, FILM COATED ORAL at 21:24

## 2019-04-17 RX ADMIN — Medication 10 ML: at 08:30

## 2019-04-17 RX ADMIN — FUROSEMIDE 40 MG: 10 INJECTION, SOLUTION INTRAMUSCULAR; INTRAVENOUS at 16:20

## 2019-04-17 RX ADMIN — APIXABAN 2.5 MG: 2.5 TABLET, FILM COATED ORAL at 08:30

## 2019-04-17 RX ADMIN — FUROSEMIDE 40 MG: 10 INJECTION, SOLUTION INTRAMUSCULAR; INTRAVENOUS at 08:30

## 2019-04-17 RX ADMIN — CALCIUM CARBONATE 600 MG (1,500 MG)-VITAMIN D3 400 UNIT TABLET 1 TABLET: at 08:30

## 2019-04-17 RX ADMIN — NYSTATIN 500000 UNITS: 100000 SUSPENSION ORAL at 22:58

## 2019-04-17 RX ADMIN — PANTOPRAZOLE SODIUM 40 MG: 40 TABLET, DELAYED RELEASE ORAL at 08:30

## 2019-04-17 ASSESSMENT — PAIN SCALES - GENERAL
PAINLEVEL_OUTOF10: 0

## 2019-04-17 NOTE — CARE COORDINATION
ONGOING DISCHARGE PLAN:    Reviewed patients chart regarding discharge plan and chart still confirms the plan is to discharge to [facility]  Per PCP  Plan:  COPD, Pulmonary fibrosis - prognosis guarded. Chronic CHF - at baseline. D/C planning ongoing - referral to LTAC. Continue current treatments. Complete orders per chart. Will review plan with patient and or family    Pt is agreeable for a ltach   Will continue to follow for additional discharge needs.      Electronically signed by Elvi Rush RN on 4/17/2019 at 12:11 PM

## 2019-04-17 NOTE — PROGRESS NOTES
[address]   OCCUPATIONAL THERAPY MISSED TREATMENT NOTE   INPATIENT   Date: 19  Patient Name: Dacia Valle       Room:   MRN: [id number]   Account #: [de-identified]    : 1940  (78 y.o.)  Gender: female   Referring Practitioner: Dr. Denisse Mackay  Diagnosis: CHF             REASON FOR MISSED TREATMENT:  Patient unable to participate   -   Other - pt fatigued; requesting therapies return later.           MONCHO Mckeon

## 2019-04-17 NOTE — PROGRESS NOTES
RN messaged Dr. Jazmine Villarreal via perfect serve to update on possible thrush in patient's mouth.

## 2019-04-17 NOTE — PROGRESS NOTES
[facility] PHYS[facility] CARDIOLOGY Progress Note    4/17/2019 2:06 PM      Subjective:  Ms. Cydney Kingsley is sitting up at bedside chair and denied any worsening shortness of breath or anginal chest pain or palpitations.            LABS:     Recent Results (from the past 24 hour(s))   Basic Metabolic Prof    Collection Time: 04/17/19  5:11 AM   Result Value Ref Range    Glucose 115 (H) 70 - 99 mg/dL    BUN 55 (H) 8 - 23 mg/dL    CREATININE 0.73 0.50 - 0.90 mg/dL    Bun/Cre Ratio NOT REPORTED 9 - 20    Calcium 9.2 8.6 - 10.4 mg/dL    Sodium 135 135 - 144 mmol/L    Potassium 4.3 3.7 - 5.3 mmol/L    Chloride 92 (L) 98 - 107 mmol/L    CO2 35 (H) 20 - 31 mmol/L    Anion Gap 8 (L) 9 - 17 mmol/L    GFR Non-African American >60 >60 mL/min    GFR African American >60 >60 mL/min    GFR Comment          GFR Staging NOT REPORTED    CBC with DIFF    Collection Time: 04/17/19  5:11 AM   Result Value Ref Range    WBC 10.8 3.5 - 11.0 k/uL    RBC 4.37 4.0 - 5.2 m/uL    Hemoglobin 12.7 12.0 - 16.0 g/dL    Hematocrit 39.4 36 - 46 %    MCV 90.2 80 - 100 fL    MCH 29.0 26 - 34 pg    MCHC 32.1 31 - 37 g/dL    RDW 20.1 (H) 11.5 - 14.9 %    Platelets 900 797 - 753 k/uL    MPV 9.5 6.0 - 12.0 fL    NRBC Automated NOT REPORTED per 100 WBC    Differential Type NOT REPORTED     Immature Granulocytes NOT REPORTED 0 %    Absolute Immature Granulocyte NOT REPORTED 0.00 - 0.30 k/uL    WBC Morphology NOT REPORTED     RBC Morphology NOT REPORTED     Platelet Estimate NOT REPORTED     Seg Neutrophils 90 (H) 36 - 66 %    Lymphocytes 9 (L) 24 - 44 %    Monocytes 1 1 - 7 %    Eosinophils % 0 0 - 4 %    Basophils 0 0 - 2 %    Segs Absolute 9.72 (H) 1.3 - 9.1 k/uL    Absolute Lymph # 0.97 (L) 1.0 - 4.8 k/uL    Absolute Mono # 0.11 0.1 - 1.3 k/uL    Absolute Eos # 0.00 0.0 - 0.4 k/uL    Basophils # 0.00 0.0 - 0.2 k/uL    Morphology HYPOCHROMIA PRESENT     Morphology ANISOCYTOSIS PRESENT     Morphology FEW ELLIPTOCYTES     Morphology FEW TARGET CELLS        Pulse Ox: SpO2  Av.9 %  Min: 85 %  Max: 98 %    Supplemental O2: O2 Flow Rate (L/min): 60 L/min     Current Meds:    calcium carb-cholecalciferol  1 tablet Oral Daily    predniSONE  40 mg Oral Daily    furosemide  40 mg Intravenous BID    ipratropium-albuterol  1 ampule Inhalation 4x daily    potassium chloride  20 mEq Oral BID WC    apixaban  2.5 mg Oral BID    aspirin  81 mg Oral Daily    atorvastatin  80 mg Oral Daily    citalopram  20 mg Oral Daily    metoprolol succinate  100 mg Oral Daily    pantoprazole  40 mg Oral QAM AC    sodium chloride flush  10 mL Intravenous 2 times per day            VITAL SIGNS:    /74   Pulse 85   Temp 97.7 °F (36.5 °C) (Axillary)   Resp 19   Ht 5' 6\" (1.676 m)   Wt 216 lb 0.8 oz (98 kg)   LMP 1985   SpO2 (!) 89%   BMI 34.87 kg/m²  60 L/min      Admit Weight:  210 lb (95.3 kg)    Last 3 weights: Wt Readings from Last 3 Encounters:   19 216 lb 0.8 oz (98 kg)   19 194 lb (88 kg)   19 189 lb (85.7 kg)       BMI: Body mass index is 34.87 kg/m². INPUT/OUTPUT:          Intake/Output Summary (Last 24 hours) at 2019 1406  Last data filed at 2019 0948  Gross per 24 hour   Intake 670 ml   Output 750 ml   Net -80 ml         Telemetry shows atrial fibrillation. EXAM:     General appearance: awake, alert. Pleasant. Neck: +JVD. Chest: diminished air entry bilaterally. Cardiac: Irregular rate and rhythm. Extremities: + leg edema. ASSESSMENT:    Acute on chronic cor pulmonale, Acute on chronic systolic heart failure with LVEF 45-50%.    Atherosclerotic coronary artery disease with CABG twice.  No active angina pectoris.     Severe pulmonary hypertension, severe tricuspid regurgitation, severe pulmonary fibrosis-on high flow oxygen.     Chronic atrial fibrillation-rate controlled. Chronic respiratory failure. Pulmonary fibrosis.     Other medical problems as charted. REC/PLAN:    Status quo.     Continue on current cardiac medications. Charge nurse updated - await LTAC placement. Electronically signed by Chantal Streeter MD, [facility]        PLEASE NOTE:  This progress note was completed using a voice transcription system. Every effort was made to ensure accuracy. However, inadvertent computerized transcription errors may be present.

## 2019-04-17 NOTE — PROGRESS NOTES
Pulmonary Progress Note  [facility]ulmonary [facility]      Patient - Quinten Durbin,  Age - 78 y.o.    - 1940      Room Number -    N -  [id number]   [facility]  Date of Admission -  2019  4:16 PM    Consulting Kourtney Padilla MD  Primary Care Physician - Mike Bazzi MD     SUBJECTIVE   Patient resting quietly. On hyperal.  She appears stable. Looks better from yesterday. Son Pat Fitzgerald present. OBJECTIVE   VITALS    height is 5' 6\" (1.676 m) and weight is 216 lb 0.8 oz (98 kg). Her axillary temperature is 97.3 °F (36.3 °C). Her blood pressure is 102/64 (pended) and her pulse is 84 (pended). Her respiration is 23 (pended) and oxygen saturation is 74% (abnormal, pended). Body mass index is 34.87 kg/m². Temperature Range: Temp: 97.3 °F (36.3 °C) Temp  Av.6 °F (36.4 °C)  Min: 97.3 °F (36.3 °C)  Max: 97.7 °F (36.5 °C)  BP Range:  Systolic (64YGD), AYF:213 , Min:82 , LV     Diastolic (55MKE), TEN:39, Min:43, Max:93    Pulse Range: Pulse  Av.7  Min: 79  Max: 107  Respiration Range: Resp  Av.4  Min: 13  Max: 37  Current Pulse Ox[de-identified]  SpO2: (!) (P) 74 %(74%-95% with ambulation, no shortness of breath)  24HR Pulse Ox Range:  SpO2  Av %  Min: 74 %  Max: 98 %  Oxygen Amount and Delivery: O2 Flow Rate (L/min): 45 L/min    Wt Readings from Last 3 Encounters:   19 216 lb 0.8 oz (98 kg)   19 194 lb (88 kg)   19 189 lb (85.7 kg)       I/O (24 Hours)    Intake/Output Summary (Last 24 hours) at 2019 1629  Last data filed at 2019 1626  Gross per 24 hour   Intake 1870 ml   Output 1400 ml   Net 470 ml       EXAM     General Appearance  Awake, alert, oriented, in no acute distress  HEENT - normocephalic, atraumatic. Neck - Supple,  trachea midline   Lungs - chronic crackles at her bases Bilaterally posteriorly  Heart Exam:PMI normal. No lifts, heaves, or thrills. RRR.  No murmurs, clicks, gallops, or

## 2019-04-17 NOTE — PLAN OF CARE
Problem: Falls - Risk of:  Goal: Will remain free from falls  Description  Will remain free from falls  4/17/2019 1514 by Ria Salazar RN  Outcome: Ongoing  4/17/2019 0612 by Izabela Teresa RN  Outcome: Ongoing  Note:   1. Pt remains free from accidental injuries. 2. Fall precautions in place. 3. Bed in low position, wheels locked, and call light within reach. 4. ID band in place and checked frequently. Goal: Absence of physical injury  Description  Absence of physical injury  Outcome: Ongoing     Problem: Risk for Impaired Skin Integrity  Goal: Tissue integrity - skin and mucous membranes  Description  Structural intactness and normal physiological function of skin and  mucous membranes. 4/17/2019 1514 by Ria Salazar RN  Outcome: Ongoing  4/17/2019 0612 by Izabela Teresa RN  Outcome: Ongoing  Note:   Patient with edematous BLE with limited mobility. Repositioned frequently, no skin break down noted     Problem: Breathing Pattern - Ineffective:  Goal: Ability to achieve and maintain a regular respiratory rate will improve  Description  Ability to achieve and maintain a regular respiratory rate will improve  4/17/2019 1514 by Ria Salazar RN  Outcome: Ongoing  4/17/2019 0612 by Izabela Teresa RN  Outcome: Ongoing  Note:   Disease process causes dyspnea w/ exertion. No dyspnea at rest noted     Problem: Musculor/Skeletal Functional Status  Goal: Highest potential functional level  Outcome: Ongoing  Goal: Absence of falls  Outcome: Ongoing     Problem: Pain:  Goal: Pain level will decrease  Description  Pain level will decrease  Outcome: Ongoing  Goal: Control of acute pain  Description  Control of acute pain  4/17/2019 1514 by Ria Salazar RN  Outcome: Ongoing  4/17/2019 0612 by Izabela Teresa RN  Outcome: Met This Shift  Note:   PRN medication given as ordered.  Pain management achieved  Goal: Control of chronic pain  Description  Control of chronic pain  Outcome: Ongoing Problem: Nutrition  Goal: Optimal nutrition therapy  Outcome: Ongoing       Patient remains free of falls and injury this shift. Patient calls out appropriately. Up to chair for most of shift. Agrees to work with PT. Minimal complaints of pain this shift.  Patient encouraged to consume 50% or greater of each meal.

## 2019-04-17 NOTE — FLOWSHEET NOTE
04/17/19 1507   Encounter Summary   Services provided to: Patient   Referral/Consult From: Todd   Continue Visiting   (4/17/19V)   Volunteer Visit Yes   Complexity of Encounter Low   Length of Encounter 15 minutes   Routine   Type Follow up   Spiritual/Moravian   Type Spiritual support   Intervention Communion   Sacraments   Communion Patient received communion

## 2019-04-17 NOTE — PROGRESS NOTES
[id number]    Physical Therapy Progress Note    Date: 19  Patient Name: Zena Meigs       Room:   MRN: [id number]   Account: [de-identified]   : 1940  (78 y.o.)   Gender: female     Discharge Recommendations   Subacute/Skilled Nursing Facility  Equipment Needed: No    Referring Practitioner: Dr. Arsh Hernandez  Diagnosis: CHF  Restrictions/Precautions: Fall Risk  Implants present? : Metal implants(Sternal wires from CABG)   Past Medical History:  has a past medical history of Acute cystitis without hematuria, Anxiety, Atrial fibrillation ([address] Orantes's esophagus, Centrilobular emphysema ([address] Chronic diastolic CHF (congestive heart failure) ([address] Chronic hypoxemic respiratory failure ([address] GERD (gastroesophageal reflux disease), Heart disease, Hypertension, Hypotension, unspecified, Iatrogenic hypotension, Interstitial lung disease ([address] Lipids abnormal, Mixed hyperlipidemia, LIANET on CPAP, Pneumonia, Pulmonary embolus ([address] Pulmonary fibrosis ([address] Pulmonary hypertension ([address] S/P CABG x 4, and UIP (usual interstitial pneumonitis) ([address] Past Surgical History:   has a past surgical history that includes Breast biopsy (); Cardiac surgery (; ); Cholecystectomy (); Cardioversion; and Bypass Graft (N/A). Overall Orientation Status: Within Normal Limits  Restrictions/Precautions  Restrictions/Precautions: Fall Risk  Required Braces or Orthoses?: No  Implants present? : Metal implants(Sternal wires from CABG)    Subjective: ~13:25 Pt. stated she was recently up and moving, try to come back later.      ~15:20 Pt. agreed to participate in therapy.    Comments: ARA Guzman    Vital Signs  Pulse: 84  Resp: 23  BP: 102/64  BP Location: Right Arm  BP Upper/Lower: Upper  Patient Currently in Pain: Denies        Oxygen Therapy  SpO2: (!) 74 %(74%-95% with ambulation, no shortness of breath)  Pulse Oximeter Device Mode: Continuous  Pulse Oximeter Device Location: Finger  O2 Device: Heated high flow cannula        Bed Mobility:   Bed Mobility  Comment: Not assessed: pt. in bedside chair upon arrival, pt. is limited by endurance and wants to focus on ambulation    Transfers:  Sit to Stand: Contact guard assistance  Stand to sit: Contact guard assistance  Bed to Chair: Contact guard assistance      Ambulation 1  Surface: level tile  Device: Rolling Walker  Other Apparatus: O2  Assistance: Contact guard assistance  Quality of Gait: Pt demos good balance, short step lengths. Distance: Back & forth in room, limited by oxygen machine hose: ~ 30 ft  Comments: limited by Hi flow lines and endurance  Ambulation 2  Surface - 2: level tile  Device 2: Rolling Walker  Other Apparatus 2: O2  Assistance 2: Contact guard assistance  Quality of Gait 2: Pt demos good balance, short step lengths. Distance: Back & forth in room, limited by oxygen machine hose: ~ 20 ft  Comments: limited by Hi flow lines and endurance     Stairs/Curb  Stairs?: No     Posture: Good  Sitting - Static: Good  Sitting - Dynamic: Good  Standing - Static: Good  Standing - Dynamic: Good;-  Comments: standing balance assessed with RW     Other exercises?: Yes  Other exercises 1: EOB Bilat LE Exercise x 15      Activity Tolerance: Patient limited by endurance; Other  Activity Tolerance: requires rest breaks to recover from SOB and for SPO2 to normalize      Assessment  Activity Tolerance: Patient limited by endurance; Other   Body structures, Functions, Activity limitations: Decreased functional mobility ; Decreased strength;Decreased endurance;Decreased ROM; Decreased balance  Assessment: pt would benefit from SNF w/ pulmonary/PT rehab at D/C  Specific instructions for Next Treatment: 4-8-19 SNF W/ PULMONARY REHAB; gait w/ w walker 5' x 3 forward and back w/ CGA x 1 w/ O2 at 6 L, watch O2 sats  Prognosis: Fair  Discharge Recommendations: Subacute/Skilled Nursing Facility     Type of devices:  All fall risk precautions in place;Call light within reach;Gait belt;Patient at risk for falls;Nurse notified; Left in chair(Nurse Guzman)     Plan  Times per week: 5-7 treatments/ week  Times per day: (5-7 treatments/ week)  Current Treatment Recommendations: Strengthening, Transfer Training, Endurance Training, Patient/Caregiver Education & Training, Balance Training, Gait Training, Functional Mobility Training, Stair training, Safety Education & Training    Patient Education  New Education Provided:   no new education      Goals  Short term goals  Time Frame for Short term goals: 5-7 treatments/ week  Short term goal 1: pt to roll independently using rail for position change  Short term goal 2: pt to transfer supine to sit w/ supervision using rail and sit to supine w/ mod x 1  Short term goal 3: pt to transfer sit to stand w/ SBA x 1 and bed to chair using w walker w/ CGA x 1  Short term goal 4: pt to ambulate w/ w walker 20-30' w/ CGA x 1 and O2  Short term goal 5: pt to demonstrate good balance using w walker  Short term goal 6: pt to demonstrate good technique for LE strengthening exercises and energy conservation techniques  Short term goal 7: pt to tolerate 1/2 hour of therapuetic exercise keeping O2 sats 90% or higher    PT Individual Minutes  Time In: (P) 1520  Time Out: (P) 1559  Minutes: (P) 39    Electronically signed by Michael Ponce PTA on 4/17/19 at 4:38 PM

## 2019-04-17 NOTE — PROGRESS NOTES
FAMILY MEDICINE  - PROGRESS NOTE    Date:  4/17/2019  Asif Cisneros  [id number]      Chief Complaint   Patient presents with    Shortness of Breath         Interval History:  not changed, she has no new complaints. SNF could not take her, referral made to LTAC.       Subjective  Respiratory: positive for emphysema and shortness of breath  Cardiovascular: positive for irregular heart beat and lower extremity edema  Musculoskeletal:positive for arthralgias and myalgias  Behavioral/Psych: positive for anxiety and obesity:    Objective:    /66   Pulse 86   Temp 97.7 °F (36.5 °C) (Oral)   Resp 16   Ht 5' 6\" (1.676 m)   Wt 216 lb 0.8 oz (98 kg)   LMP 11/01/1985   SpO2 95%   BMI 34.87 kg/m²   General appearance - overweight  Mental status - alert, oriented to person, place, and time  Eyes - pupils equal and reactive, extraocular eye movements intact  Ears - hearing grossly normal bilaterally  Nose - normal and patent, no erythema, discharge or polyps  Mouth - mucous membranes moist, pharynx normal without lesions  Neck - supple, no significant adenopathy  Lymphatics - no palpable lymphadenopathy, no hepatosplenomegaly  Chest - decreased air entry noted posteriorly  Heart - irregularly irregular rhythm with rate 86  Abdomen - soft, nontender, nondistended, no masses or organomegaly  Breasts - not examined  Back exam - not examined  Neurological - alert, oriented, normal speech, no focal findings or movement disorder noted  Musculoskeletal - osteoarthritic changes noted in both hands  Extremities - pedal edema trace +  Skin - normal coloration and turgor, no rashes, no suspicious skin lesions noted    Data:   Medications:   Current Facility-Administered Medications   Medication Dose Route Frequency Provider Last Rate Last Dose    predniSONE (DELTASONE) tablet 40 mg  40 mg Oral Daily Selma Diaz MD   40 mg at 04/16/19 0857    furosemide (LASIX) injection 40 mg  40 mg Intravenous BID Cory Aviles MD   40 mg at 04/16/19 1800    ipratropium-albuterol (DUONEB) nebulizer solution 1 ampule  1 ampule Inhalation 4x daily Jerrod Spaulding MD   1 ampule at 04/16/19 1944    potassium chloride (KLOR-CON M) extended release tablet 20 mEq  20 mEq Oral BID WC Neomia Felty Adusumilli, MD   20 mEq at 04/16/19 1700    apixaban (ELIQUIS) tablet 2.5 mg  2.5 mg Oral BID Paulette Nails MD   2.5 mg at 04/16/19 2059    aspirin EC tablet 81 mg  81 mg Oral Daily Paulette Nails MD   81 mg at 04/16/19 0855    atorvastatin (LIPITOR) tablet 80 mg  80 mg Oral Daily Paulette Nails MD   80 mg at 04/16/19 2059    calcium carbonate-vitamin D (CALTRATE) 600-400 MG-UNIT per tab 1 tablet  1 tablet Oral Daily Paulette Nails MD   1 tablet at 04/16/19 0859    citalopram (CELEXA) tablet 20 mg  20 mg Oral Daily Paulette Nails MD   20 mg at 04/16/19 0855    LORazepam (ATIVAN) tablet 0.5 mg  0.5 mg Oral Q8H PRN Paulette Nails MD   0.5 mg at 04/16/19 2233    metoprolol succinate (TOPROL XL) extended release tablet 100 mg  100 mg Oral Daily Paulette Nails MD   100 mg at 04/15/19 0834    pantoprazole (PROTONIX) tablet 40 mg  40 mg Oral QAM AC Paulette Nails MD   40 mg at 04/16/19 0518    sodium chloride flush 0.9 % injection 10 mL  10 mL Intravenous 2 times per day Paulette Nails MD   10 mL at 04/16/19 2102    sodium chloride flush 0.9 % injection 10 mL  10 mL Intravenous PRN Paulette Nails MD   10 mL at 04/14/19 1810    acetaminophen (TYLENOL) tablet 650 mg  650 mg Oral Q4H PRN Paulette Nails MD   650 mg at 04/14/19 1145    morphine (PF) injection 2 mg  2 mg Intravenous Q2H PRN Paulette Nails MD   2 mg at 04/15/19 0030    Or    morphine sulfate (PF) injection 4 mg  4 mg Intravenous Q2H PRN Paulette Nails MD   4 mg at 04/16/19 8725    ondansetron (ZOFRAN) injection 4 mg  4 mg Intravenous Q8H PRN Paulette Nails MD           Intake/Output Summary (Last 24 hours) at 4/17/2019 0538  Last data filed at 4/17/2019 0500  Gross per 24 hour   Intake 420 ml   Output 1650 ml   Net -1230 ml     Recent Results (from the past 24 hour(s))   CBC with DIFF    Collection Time: 04/17/19  5:11 AM   Result Value Ref Range    WBC 10.8 3.5 - 11.0 k/uL    RBC 4.37 4.0 - 5.2 m/uL    Hemoglobin 12.7 12.0 - 16.0 g/dL    Hematocrit 39.4 36 - 46 %    MCV 90.2 80 - 100 fL    MCH 29.0 26 - 34 pg    MCHC 32.1 31 - 37 g/dL    RDW 20.1 (H) 11.5 - 14.9 %    Platelets 437 395 - 921 k/uL    MPV 9.5 6.0 - 12.0 fL    NRBC Automated NOT REPORTED per 100 WBC    Differential Type NOT REPORTED     Seg Neutrophils PENDING %    Lymphocytes PENDING %    Monocytes PENDING %    Eosinophils % PENDING %    Basophils PENDING %    Immature Granulocytes NOT REPORTED 0 %    Segs Absolute PENDING k/uL    Absolute Lymph # PENDING k/uL    Absolute Mono # PENDING k/uL    Absolute Eos # PENDING k/uL    Basophils # PENDING 0.0 - 0.2 k/uL    Absolute Immature Granulocyte NOT REPORTED 0.00 - 0.30 k/uL    WBC Morphology NOT REPORTED     RBC Morphology NOT REPORTED     Platelet Estimate NOT REPORTED      -----------------------------------------------------------------  RAD:  EKG:  Micro:     Assessment & Plan:    Patient Active Problem List:     GERD (gastroesophageal reflux disease)     Anxiety     Lipids abnormal     Hypertension     Heart disease     Atrial fibrillation (HCC)     Pulmonary fibrosis (HCC)     Pneumonia     Mixed hyperlipidemia     Acute cystitis without hematuria     Interstitial lung disease (HCC)     Centrilobular emphysema (HCC)     Pulmonary hypertension (HCC)     Chronic diastolic CHF (congestive heart failure) (HCC)     Pulmonary embolus (HCC)     CAD (coronary artery disease), native coronary artery     Hypertensive heart disease without heart failure     Hypotension     Hypotension, unspecified     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     Iatrogenic hypotension     Lung nodule, solitary     S/P CABG x 4     Acute on chronic respiratory failure with hypoxemia (HCC)     Hypoxia     Moderate malnutrition (HCC)     CHF, left ventricular (HCC)     Acute on chronic diastolic CHF (congestive heart failure) (HCC)           Plan:  COPD, Pulmonary fibrosis - prognosis guarded. Chronic CHF - at baseline. D/C planning ongoing - referral to LTAC. Continue current treatments. Complete orders per chart.     See orders   Disposition:    Electronically signed by Asa Cristina MD on 4/17/2019 at 5:38 AM

## 2019-04-17 NOTE — CARE COORDINATION
nHpredict Inpatient Visit    Patient/family seen: yes, Pat Stephen    Provided patient/family with copy of nHpredict tool: yes 4/17/19     All questions answered at the time of visit. Informed patient/family that the nHpredict is a tool, to be used as a guide, and should not alter their currently established discharge plan. Current discharge plan: [address] completed with case management: Met with Danay ROBLES, she stated that pt is to go to [facility]. SNF is unable to do high [place] oxygen. Pat Stephen met at bedside and explained nHpredict tool and its recommendation, which state a skill facility would offer the greater gains.

## 2019-04-18 LAB
ABSOLUTE BANDS #: 0.1 K/UL (ref 0–1)
ABSOLUTE EOS #: 0 K/UL (ref 0–0.4)
ABSOLUTE IMMATURE GRANULOCYTE: ABNORMAL K/UL (ref 0–0.3)
ABSOLUTE LYMPH #: 0.4 K/UL (ref 1–4.8)
ABSOLUTE MONO #: 0.5 K/UL (ref 0.1–1.3)
ANION GAP SERPL CALCULATED.3IONS-SCNC: 7 MMOL/L (ref 9–17)
BANDS: 1 % (ref 0–10)
BASOPHILS # BLD: 0 % (ref 0–2)
BASOPHILS ABSOLUTE: 0 K/UL (ref 0–0.2)
BUN BLDV-MCNC: 51 MG/DL (ref 8–23)
BUN/CREAT BLD: ABNORMAL (ref 9–20)
CALCIUM SERPL-MCNC: 9.2 MG/DL (ref 8.6–10.4)
CHLORIDE BLD-SCNC: 92 MMOL/L (ref 98–107)
CO2: 38 MMOL/L (ref 20–31)
CREAT SERPL-MCNC: 0.7 MG/DL (ref 0.5–0.9)
DIFFERENTIAL TYPE: ABNORMAL
EOSINOPHILS RELATIVE PERCENT: 0 % (ref 0–4)
GFR AFRICAN AMERICAN: >60 ML/MIN
GFR NON-AFRICAN AMERICAN: >60 ML/MIN
GFR SERPL CREATININE-BSD FRML MDRD: ABNORMAL ML/MIN/{1.73_M2}
GFR SERPL CREATININE-BSD FRML MDRD: ABNORMAL ML/MIN/{1.73_M2}
GLUCOSE BLD-MCNC: 114 MG/DL (ref 70–99)
HCT VFR BLD CALC: 40.3 % (ref 36–46)
HEMOGLOBIN: 12.8 G/DL (ref 12–16)
IMMATURE GRANULOCYTES: ABNORMAL %
LYMPHOCYTES # BLD: 4 % (ref 24–44)
MCH RBC QN AUTO: 28.8 PG (ref 26–34)
MCHC RBC AUTO-ENTMCNC: 31.8 G/DL (ref 31–37)
MCV RBC AUTO: 90.5 FL (ref 80–100)
MONOCYTES # BLD: 5 % (ref 1–7)
MORPHOLOGY: ABNORMAL
NRBC AUTOMATED: ABNORMAL PER 100 WBC
PDW BLD-RTO: 20.1 % (ref 11.5–14.9)
PLATELET # BLD: 187 K/UL (ref 150–450)
PLATELET ESTIMATE: ABNORMAL
PMV BLD AUTO: 9.2 FL (ref 6–12)
POTASSIUM SERPL-SCNC: 4.1 MMOL/L (ref 3.7–5.3)
RBC # BLD: 4.45 M/UL (ref 4–5.2)
RBC # BLD: ABNORMAL 10*6/UL
SEG NEUTROPHILS: 90 % (ref 36–66)
SEGMENTED NEUTROPHILS ABSOLUTE COUNT: 8.9 K/UL (ref 1.3–9.1)
SODIUM BLD-SCNC: 137 MMOL/L (ref 135–144)
WBC # BLD: 9.9 K/UL (ref 3.5–11)
WBC # BLD: ABNORMAL 10*3/UL

## 2019-04-18 PROCEDURE — 80048 BASIC METABOLIC PNL TOTAL CA: CPT

## 2019-04-18 PROCEDURE — 99232 SBSQ HOSP IP/OBS MODERATE 35: CPT | Performed by: FAMILY MEDICINE

## 2019-04-18 PROCEDURE — 94640 AIRWAY INHALATION TREATMENT: CPT

## 2019-04-18 PROCEDURE — 36415 COLL VENOUS BLD VENIPUNCTURE: CPT

## 2019-04-18 PROCEDURE — 6360000002 HC RX W HCPCS: Performed by: STUDENT IN AN ORGANIZED HEALTH CARE EDUCATION/TRAINING PROGRAM

## 2019-04-18 PROCEDURE — 6370000000 HC RX 637 (ALT 250 FOR IP): Performed by: INTERNAL MEDICINE

## 2019-04-18 PROCEDURE — 6360000002 HC RX W HCPCS: Performed by: INTERNAL MEDICINE

## 2019-04-18 PROCEDURE — 2700000000 HC OXYGEN THERAPY PER DAY

## 2019-04-18 PROCEDURE — 94762 N-INVAS EAR/PLS OXIMTRY CONT: CPT

## 2019-04-18 PROCEDURE — 85025 COMPLETE CBC W/AUTO DIFF WBC: CPT

## 2019-04-18 PROCEDURE — 6370000000 HC RX 637 (ALT 250 FOR IP): Performed by: STUDENT IN AN ORGANIZED HEALTH CARE EDUCATION/TRAINING PROGRAM

## 2019-04-18 PROCEDURE — 2580000003 HC RX 258: Performed by: STUDENT IN AN ORGANIZED HEALTH CARE EDUCATION/TRAINING PROGRAM

## 2019-04-18 PROCEDURE — 2060000000 HC ICU INTERMEDIATE R&B

## 2019-04-18 PROCEDURE — 97535 SELF CARE MNGMENT TRAINING: CPT

## 2019-04-18 RX ADMIN — CITALOPRAM HYDROBROMIDE 20 MG: 20 TABLET ORAL at 11:52

## 2019-04-18 RX ADMIN — MORPHINE SULFATE 2 MG: 2 INJECTION, SOLUTION INTRAMUSCULAR; INTRAVENOUS at 23:02

## 2019-04-18 RX ADMIN — PREDNISONE 30 MG: 20 TABLET ORAL at 11:56

## 2019-04-18 RX ADMIN — CALCIUM CARBONATE 600 MG (1,500 MG)-VITAMIN D3 400 UNIT TABLET 1 TABLET: at 08:04

## 2019-04-18 RX ADMIN — PANTOPRAZOLE SODIUM 40 MG: 40 TABLET, DELAYED RELEASE ORAL at 08:04

## 2019-04-18 RX ADMIN — ASPIRIN 81 MG: 81 TABLET, COATED ORAL at 11:51

## 2019-04-18 RX ADMIN — FUROSEMIDE 40 MG: 10 INJECTION, SOLUTION INTRAMUSCULAR; INTRAVENOUS at 11:54

## 2019-04-18 RX ADMIN — APIXABAN 2.5 MG: 2.5 TABLET, FILM COATED ORAL at 20:46

## 2019-04-18 RX ADMIN — IPRATROPIUM BROMIDE AND ALBUTEROL SULFATE 1 AMPULE: .5; 3 SOLUTION RESPIRATORY (INHALATION) at 20:32

## 2019-04-18 RX ADMIN — POTASSIUM CHLORIDE 20 MEQ: 1500 TABLET, EXTENDED RELEASE ORAL at 08:04

## 2019-04-18 RX ADMIN — LORAZEPAM 0.5 MG: 0.5 TABLET ORAL at 11:51

## 2019-04-18 RX ADMIN — FUROSEMIDE 40 MG: 10 INJECTION, SOLUTION INTRAMUSCULAR; INTRAVENOUS at 20:46

## 2019-04-18 RX ADMIN — APIXABAN 2.5 MG: 2.5 TABLET, FILM COATED ORAL at 11:50

## 2019-04-18 RX ADMIN — IPRATROPIUM BROMIDE AND ALBUTEROL SULFATE 1 AMPULE: .5; 3 SOLUTION RESPIRATORY (INHALATION) at 14:46

## 2019-04-18 RX ADMIN — POTASSIUM CHLORIDE 20 MEQ: 1500 TABLET, EXTENDED RELEASE ORAL at 16:58

## 2019-04-18 RX ADMIN — IPRATROPIUM BROMIDE AND ALBUTEROL SULFATE 1 AMPULE: .5; 3 SOLUTION RESPIRATORY (INHALATION) at 10:36

## 2019-04-18 RX ADMIN — NYSTATIN 500000 UNITS: 100000 SUSPENSION ORAL at 11:51

## 2019-04-18 RX ADMIN — METOPROLOL SUCCINATE 100 MG: 100 TABLET, EXTENDED RELEASE ORAL at 08:04

## 2019-04-18 RX ADMIN — NYSTATIN 500000 UNITS: 100000 SUSPENSION ORAL at 17:00

## 2019-04-18 RX ADMIN — Medication 10 ML: at 11:56

## 2019-04-18 RX ADMIN — IPRATROPIUM BROMIDE AND ALBUTEROL SULFATE 1 AMPULE: .5; 3 SOLUTION RESPIRATORY (INHALATION) at 08:33

## 2019-04-18 RX ADMIN — Medication 10 ML: at 20:46

## 2019-04-18 RX ADMIN — LORAZEPAM 0.5 MG: 0.5 TABLET ORAL at 23:02

## 2019-04-18 RX ADMIN — NYSTATIN 500000 UNITS: 100000 SUSPENSION ORAL at 20:56

## 2019-04-18 RX ADMIN — ATORVASTATIN CALCIUM 80 MG: 80 TABLET, FILM COATED ORAL at 20:46

## 2019-04-18 ASSESSMENT — PAIN SCALES - GENERAL
PAINLEVEL_OUTOF10: 0

## 2019-04-18 ASSESSMENT — PAIN DESCRIPTION - LOCATION: LOCATION: HEAD

## 2019-04-18 NOTE — PROGRESS NOTES
[facility] PHYSICIANS CARDIOLOGY Progress Note    4/18/2019 10:21 AM      Subjective:  Ms. Caitlyn Colón is sleeping comfortably in bedside chair and denies any worsening chest pain or new cardiac symptoms. Nurse updated overnight events.              LABS:     Recent Results (from the past 24 hour(s))   Basic Metabolic Prof    Collection Time: 04/18/19  5:03 AM   Result Value Ref Range    Glucose 114 (H) 70 - 99 mg/dL    BUN 51 (H) 8 - 23 mg/dL    CREATININE 0.70 0.50 - 0.90 mg/dL    Bun/Cre Ratio NOT REPORTED 9 - 20    Calcium 9.2 8.6 - 10.4 mg/dL    Sodium 137 135 - 144 mmol/L    Potassium 4.1 3.7 - 5.3 mmol/L    Chloride 92 (L) 98 - 107 mmol/L    CO2 38 (H) 20 - 31 mmol/L    Anion Gap 7 (L) 9 - 17 mmol/L    GFR Non-African American >60 >60 mL/min    GFR African American >60 >60 mL/min    GFR Comment          GFR Staging NOT REPORTED    CBC with DIFF    Collection Time: 04/18/19  5:03 AM   Result Value Ref Range    WBC 9.9 3.5 - 11.0 k/uL    RBC 4.45 4.0 - 5.2 m/uL    Hemoglobin 12.8 12.0 - 16.0 g/dL    Hematocrit 40.3 36 - 46 %    MCV 90.5 80 - 100 fL    MCH 28.8 26 - 34 pg    MCHC 31.8 31 - 37 g/dL    RDW 20.1 (H) 11.5 - 14.9 %    Platelets 323 079 - 799 k/uL    MPV 9.2 6.0 - 12.0 fL    NRBC Automated NOT REPORTED per 100 WBC    Differential Type NOT REPORTED     Immature Granulocytes NOT REPORTED 0 %    Absolute Immature Granulocyte NOT REPORTED 0.00 - 0.30 k/uL    WBC Morphology NOT REPORTED     RBC Morphology NOT REPORTED     Platelet Estimate NOT REPORTED     Seg Neutrophils 90 (H) 36 - 66 %    Lymphocytes 4 (L) 24 - 44 %    Monocytes 5 1 - 7 %    Eosinophils % 0 0 - 4 %    Basophils 0 0 - 2 %    Bands 1 0 - 10 %    Segs Absolute 8.90 1.3 - 9.1 k/uL    Absolute Lymph # 0.40 (L) 1.0 - 4.8 k/uL    Absolute Mono # 0.50 0.1 - 1.3 k/uL    Absolute Eos # 0.00 0.0 - 0.4 k/uL    Basophils # 0.00 0.0 - 0.2 k/uL    Absolute Bands # 0.10 0.0 - 1.0 k/uL    Morphology ANISOCYTOSIS PRESENT     Morphology HYPOCHROMIA PRESENT 45-50%.    Atherosclerotic coronary artery disease with CABG twice.  No active angina pectoris.     Severe pulmonary hypertension, severe tricuspid regurgitation, severe pulmonary fibrosis-on high flow oxygen.     Chronic atrial fibrillation-rate controlled.     Chronic respiratory failure. Pulmonary fibrosis.     Other medical problems as charted. REC/PLAN:    No significant change overall. Overall very poor prognosis. Continue on current cardiac medications and await placement. No new cardiac recommendations. Will follow. Electronically signed by Faustina Morse MD, [facility]        PLEASE NOTE:  This progress note was completed using a voice transcription system. Every effort was made to ensure accuracy. However, inadvertent computerized transcription errors may be present.

## 2019-04-18 NOTE — PROGRESS NOTES
[facility]    INPATIENT OCCUPATIONAL THERAPY  PROGRESS NOTE  Date: 2019  Patient Name: Luana Calderon      Room:   MRN: [id number]    : 1940  (78 y.o.) Gender: female     Discharge Recommendations:  [address], Continue to assess pending progress(w/pulmonary rehab )       Referring Practitioner: Dr. Jazmine Villarreal  Diagnosis: CHF  General  Chart Reviewed: Yes  Patient assessed for rehabilitation services?: Yes  Additional Pertinent Hx: Pulmonary fibrosis, oxygen dependent  Family / Caregiver Present: No  Referring Practitioner: Dr. Jazmine Villarreal  Diagnosis: CHF    Restrictions  Restrictions/Precautions: Fall Risk  Implants present? : Metal implants(Sternal wires from CABG)  Required Braces or Orthoses?: No      Subjective  Subjective: \"I want to put the purewick back in. \" writer strongly encouraged pt to distance from external female cath as able; pt is continent PTA and becoming self reliant on AE. Comments: Alert and oriented. Motivated for ADL tasks. Pt notes her sons will be here later to discuss [facility] vs [facility]. Writer reviewed c pt her current functional abilities as well as opportunity for improvement and educated on options within scope.   Patient Currently in Pain: Denies  Overall Orientation Status: Within Functional Limits     Pain Assessment  Response to Pain Intervention: Patient Satisfied    Objective  Cognition  Overall Cognitive Status: WFL     Balance  Sitting Balance: Independent  Standing Balance: Contact guard assistance(c RW)  Standing Balance  Time: ~1min x2, <15sec x2  Activity: mobiltiy tasks; stnading for ADLs  Sit to stand: Contact guard assistance  Stand to sit: Contact guard assistance  Comment: VC for hand placement c f return; no LOB, G ability to complete squat   Functional Mobility  Functional - Mobility Device: Rolling Walker  Activity: Other  Assist Level: Contact guard assistance  Functional Mobility Comments: notified  Equipment Recommendations  Equipment Needed: Yes  Grab bars: for shower transfers  Reacher: yes  Sock Aid: yes  Long-handled Shoehorn: yes  Other: long handled sponge          Patient Education:  Patient Education: OT POC, EC/WS, AE, breathing tech for SOB mgt, bladder protection  Learner:patient  Method: demonstration and explanation       Outcome: needs reinforcement     Plan  Safety Devices  Safety Devices in place: Yes  Type of devices: Patient at risk for falls, Gait belt, Left in bed, Call light within reach, Nurse notified  Plan  Times per week: 5-7  Times per day: Daily  Current Treatment Recommendations: Balance Training, Functional Mobility Training, Endurance Training, Safety Education & Training, Patient/Caregiver Education & Training, Equipment Evaluation, Education, & procurement, Self-Care / ADL      Goals  Short term goals  Time Frame for Short term goals: By Discharge  Short term goal 1: Pt will actively participate in self-care routine while sitting EOB or in chair and maintaining O2 levels 88%+. Short term goal 2: Pt will V/D good understanding of AE/modified techniques for LB ADL's and complete tasks with Min A. Short term goal 3: Pt will complete toilet transfer and toileting with SBA and Good safety using appropriate DME. Short term goal 4: Pt will V/D good understanding of EC/WS that she can utilize during daily routines. Short term goal 5: Pt will actively participate in 15-20 minutes of therapeutic exercise/activity to promote increased independence and safety with self-care and mobility.     OT Individual Minutes  Time In: 4151  Time Out: 8391  Minutes: 56      Electronically signed by MONCHO Ceja on 4/18/19 at 3:01 PM

## 2019-04-18 NOTE — PROGRESS NOTES
[facility]   OCCUPATIONAL THERAPY MISSED TREATMENT NOTE   INPATIENT   Date: 19  Patient Name: Asif Cisneros       Room:   MRN: [id number]   Account #: [de-identified]    : 1940  (78 y.o.)  Gender: female   Referring Practitioner: Dr. Francesca Gu  Diagnosis: CHF             REASON FOR MISSED TREATMENT:  Hold treatment per nursing request   -   Other - Pt in respiratory distress this AM prior to treatment attempt. Upon entry to patients room pt O2 88% while sleeping c High Flow 40L, pt desat to 87% after opening her eyes and briefly speaking with therapies. Treatment held at this time until more stable.          MONCHO Mckeon

## 2019-04-18 NOTE — FLOWSHEET NOTE
04/18/19 1352   Encounter Summary   Services provided to: Patient   Referral/Consult From: Todd   Continue Visiting   (4/18/19V)   Volunteer Visit Yes   Complexity of Encounter Low   Length of Encounter 15 minutes   Routine   Type Follow up   Spiritual/Taoism   Type Spiritual support   Intervention [address] Patient received communion

## 2019-04-18 NOTE — PROGRESS NOTES
Physical Therapy  DATE: 2019    NAME: Marie Bishop  MRN: [id number]   : 1940    Patient not seen this date for Physical Therapy due to:  [] Blood transfusion in progress  [] Hemodialysis  []  Patient Declined  [] Spine Precautions   [] Strict Bedrest  [] Surgery/ Procedure  [] Testing      [x] Other: Pt in respiratory distress shortly before attempt at 0915; on entry, pt asleep in recliner at 88% at 40L high flow O2. On waking, talking, pt desatted further to 87%. RN Jay Darnell cx'd a.m. attempt, suggested afternoon tx.         [] PT being discontinued at this time. Patient independent. No further needs. [] PT being discontinued at this time as the patient has been transferred to palliative care. No further needs.     Luis Daniel Romero, PTA

## 2019-04-18 NOTE — PROGRESS NOTES
Called to room by RN,SPO2 in low 80's on 45l/75%. Pt icreasd to 60l/80% after slowly increasing HFNC parameters. SPO2 88-89%.

## 2019-04-18 NOTE — FLOWSHEET NOTE
04/17/19 1900   Provider Notification   Reason for Communication Evaluate   Provider Name dr Mena Scheuermann   Provider Notification Physician   Method of Communication Call   Response See orders     Pt c/o buccal pain. White lesions observed to bilateral cheeks and lateral tongue. Dr. Karla Preston made aware.

## 2019-04-18 NOTE — FLOWSHEET NOTE
SC visit with patient and her nieces; patient provided medical update and explained she is waiting to discuss with her sons her desire to \"go to hospice\"; patient was accepting her situation and stated \"if I get better I can go home, if not, hospice will take care of me\"; patient comforted by prayer; listening presence and support provided;     04/18/19 1615   Encounter Summary   Services provided to: Patient and family together   Referral/Consult From: [address] Family members   Continue Visiting   (4/18/19)   Complexity of Encounter Moderate   Length of Encounter 15 minutes   Spiritual Assessment Completed Yes   Routine   Type Follow up   Spiritual/Zoroastrianism   Type Spiritual support   Assessment Approachable; Anxious; Hopeful;Coping   Intervention Active listening;Explored feelings, thoughts, concerns;Prayer;Sustaining presence/ Ministry of presence; Discussed illness/injury and it's impact   Outcome Comfort;Expressed gratitude;Engaged in conversation;Expressed feelings/needs/concerns;Coping; Hopeful;Receptive

## 2019-04-18 NOTE — CARE COORDINATION
ONGOING DISCHARGE PLAN:    Spoke with patient regarding discharge plan and patient is deciding on whether she will go to [place] or [facility]. Patient needs High flow for discharge. [place] has accepted patient but has no beds available until either Friday or Saturday. Spoke with Jeannine Basurto via telephone and he was made aware that unfortunately  his mother has only 2 choices [facility] or [facility] due to high flow oxygen. He is aware of this and is leaning towards [facility]. Son Ivan Bowers wants to visit [facility] but works the next few days , wants to wait. Writer advised Hans Esquivel that we can not wait on [place], Mother is alert and oriented and its her decision. Therefore we need a decision by tomorrow on final choice, [facility] or [facility] hospice. Hans Esquivel would like [place]. When writer spoke with Patient, She is leaning towards [facility] hospice, When writer asks her why she is thinking about hospice she  says \" Because they have Private rooms where [facility] has semi private rooms and I'm a private person. \"      Hans Esquivel did advise writer that his mother will not go anywhere until her son Ivan Bowers approves it    Therefore on Friday a final decision needs to be made as patient has a discharge order. [facility], once bed is available or [facility] hospice. Ford Esquivel wants a call when transportation is arranged so he can meet mother at chosen facility, Please arrange this with Jeannine Basurto. Will continue to follow for additional discharge needs.     Electronically signed by Anel Floyd RN on 4/18/2019 at 2:33 PM

## 2019-04-18 NOTE — PROGRESS NOTES
Pulmonary Progress Note  [facility]ists      Patient - Sylvie Potter,  Age - 78 y.o.    - 1940      Room Number -    MRN -  [id number]   [facility]  Date of Admission -  2019  4:16 PM    Consulting [address] Sanchez MD  Primary Care Physician - Cierra Burns MD     SUBJECTIVE    no distress  On fio2 75% with high flow 40 liters  appears satble    OBJECTIVE   VITALS    height is 5' 6\" (1.676 m) and weight is 216 lb 0.8 oz (98 kg). Her axillary temperature is 97.6 °F (36.4 °C). Her blood pressure is 123/68 and her pulse is 75. Her respiration is 18 and oxygen saturation is 95%. Body mass index is 34.87 kg/m². Temperature Range: Temp: 97.6 °F (36.4 °C) Temp  Av.6 °F (36.4 °C)  Min: 97.3 °F (36.3 °C)  Max: 97.7 °F (36.5 °C)  BP Range:  Systolic (18NCL), SLX:147 , Min:86 , WHR:687     Diastolic (11EID), NXN:78, Min:50, Max:80    Pulse Range: Pulse  Av.5  Min: 74  Max: 92  Respiration Range: Resp  Av  Min: 12  Max: 31  Current Pulse Ox[de-identified]  SpO2: 95 %  24HR Pulse Ox Range:  SpO2  Av.6 %  Min: 74 %  Max: 95 %  Oxygen Amount and Delivery: O2 Flow Rate (L/min): 60 L/min    Wt Readings from Last 3 Encounters:   19 194 lb (88 kg)   19 189 lb (85.7 kg)   19 194 lb 7.1 oz (88.2 kg)       I/O (24 Hours)    Intake/Output Summary (Last 24 hours) at 2019 1352  Last data filed at 2019 1232  Gross per 24 hour   Intake 1420 ml   Output 1300 ml   Net 120 ml       EXAM     General Appearance  Awake, alert, oriented, in no acute distress  HEENT - normocephalic, atraumatic. Neck - Supple,  trachea midline   Lungs - coarse no wheezes  Heart Exam:PMI normal. No lifts, heaves, or thrills. RRR. No murmurs, clicks, gallops, or rubs  Abdomen Exam: Abdomen soft, non-tender.  BS normal.  Extremity Exam: no edema    MEDS      calcium carb-cholecalciferol  1 tablet Oral Daily    predniSONE  30 mg Oral Daily  nystatin  5 mL Oral 4x Daily    furosemide  40 mg Intravenous BID    ipratropium-albuterol  1 ampule Inhalation 4x daily    potassium chloride  20 mEq Oral BID WC    apixaban  2.5 mg Oral BID    aspirin  81 mg Oral Daily    atorvastatin  80 mg Oral Daily    citalopram  20 mg Oral Daily    metoprolol succinate  100 mg Oral Daily    pantoprazole  40 mg Oral QAM AC    sodium chloride flush  10 mL Intravenous 2 times per day       LORazepam, sodium chloride flush, acetaminophen, morphine **OR** morphine, ondansetron    LABS   CBC   Recent Labs     04/18/19  0503   WBC 9.9   HGB 12.8   HCT 40.3   MCV 90.5        BMP:   Lab Results   Component Value Date     04/18/2019    K 4.1 04/18/2019    CL 92 04/18/2019    CO2 38 04/18/2019    BUN 51 04/18/2019    LABALBU 3.2 01/30/2019    CREATININE 0.70 04/18/2019    CALCIUM 9.2 04/18/2019    GFRAA >60 04/18/2019    LABGLOM >60 04/18/2019     ABGs:  Lab Results   Component Value Date    PHART 7.404 01/24/2019    PO2ART 64.1 01/24/2019    UTW5PQV 46.1 01/24/2019      Lab Results   Component Value Date    MODE NOT REPORTED 01/24/2019     Ionized Calcium:  No results found for: IONCA  Magnesium:    Lab Results   Component Value Date    MG 1.8 12/27/2017     Phosphorus:  No results found for: PHOS     LIVER PROFILE No results for input(s): AST, ALT, LIPASE, BILIDIR, BILITOT, ALKPHOS in the last 72 hours. Invalid input(s): AMYLASE,  ALB  INR No results for input(s): INR in the last 72 hours.   PTT No results found for: APTT      RADIOLOGY     (See actual reports for details)    ASSESSMENT/PLAN     Patient Active Problem List   Diagnosis    GERD (gastroesophageal reflux disease)    Anxiety    Lipids abnormal    Hypertension    Heart disease    Atrial fibrillation (HCC)    Pulmonary fibrosis (HCC)    Pneumonia    Mixed hyperlipidemia    Acute cystitis without hematuria    Interstitial lung disease ([facility]    Centrilobular emphysema ([facility]    Pulmonary hypertension (HCC)    Chronic diastolic CHF (congestive heart failure) (HCC)    Pulmonary embolus (HCC)    CAD (coronary artery disease), native coronary artery    Hypertensive heart disease without heart failure    Hypotension    Hypotension, unspecified    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    Iatrogenic hypotension    Lung nodule, solitary    S/P CABG x 4    Acute on chronic respiratory failure with hypoxemia (HCC)    Hypoxia    Moderate malnutrition (HCC)    CHF, left ventricular (HCC)    Acute on chronic diastolic CHF (congestive heart failure) (HCC)     Chronic resp failure with severe hypoxia  pulm fibrosis  Volume overload     [facility]  --- LT[facility] appears reasonable  On deltasone 30 mg  duoneb treatments   High flow    Electronically signed by Madhu Rey MD on 4/18/2019 at 1:52 PM

## 2019-04-18 NOTE — CARE COORDINATION
[facility] stated the pt  Meets ltac criteria, however, her son wants to visit first and talk it over with his sibling

## 2019-04-18 NOTE — PROGRESS NOTES
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    Iatrogenic hypotension    Lung nodule, solitary    S/P CABG x 4    Acute on chronic respiratory failure with hypoxemia (HCC)    Hypoxia    Moderate malnutrition (HCC)    CHF, left ventricular (HCC)    Acute on chronic diastolic CHF (congestive heart failure) ([address]       Palliative Interaction:The patient is sitting in the chair and is eating lunch. She has high flow oxygen on. Her son Calle Carril Ryland 25 was just here , and the nurse asked me to talk to him, and he was gone when I reached the room. She is alert and oriented and talkative. I asked what she was thinking for her discharge plans. We again discussed the plan for rehab at [facility] and also the option of hospice care. She says\" I do want to try to get stronger, and there are some things I want to do at home. \"   I told her that of course it was up to her, and that either way our focus is quality of life. I talked about always trying rehab, and if she couldn't tolerate it that her body couldn't do it that she would know then. We talked about with the focus being comfort , that she could just focus on living life and not all the hospital stays and procedures. We talked about the burden of treatment vs the benefit of it, and how it can so affect her quality of life. I offered much emotional support to her. She was so appreciative of the visit and the support. The  updated me that her son Anand Jarrell was leaning toward [facility]. Son Calle Carril Ryland 25 was more resistant and not approving either choice. Case management told the son Anand Jarrell that really it's totally her choice since she is of sound mind. Uri Valderrama was understanding and said that he will call his brother to talk. Will follow for goals of care and patient/family support.       Goals/Plan of care  Education/support to staff  Education/support to patient  Discharge planning/helping to coordinate care  Providing support for coping/adaptation/distress of patient  Continue with current plan of care  Code status clarified: [facility]  Validating patient/family distress  Continued communication updates  The patient is sitting in the chair and hs high flow oxygen intact. She is very talkative and oriented . She is deciding her discharge plans which is LTACH or a hospice option. Much emotional support offered. Will follow for goals of care and support. Loretta Herrmann .[address] [place] [address]  Rowena Rivero RN  [facility]: [phone]  [address]: [phone]  Work Cell: [phone]

## 2019-04-18 NOTE — PROGRESS NOTES
FAMILY MEDICINE  - PROGRESS NOTE    Date:  4/18/2019  Yael Lester  [id number]      Chief Complaint   Patient presents with    Shortness of Breath         Interval History:  not changed, when her O2 flow is decreased she desats. She has no new complaints.       Subjective  Constitutional: negative  Eyes: negative  Ears, nose, mouth, throat, and face: negative  Respiratory: positive for emphysema and shortness of breath  Cardiovascular: positive for irregular heart beat  Gastrointestinal: positive for reflux symptoms  Musculoskeletal:positive for arthralgias, myalgias and stiff joints  Neurological: negative  Behavioral/Psych: positive for anxiety and obesity  Endocrine: negative:    Objective:    BP (!) 147/74   Pulse 79   Temp 97.7 °F (36.5 °C) (Oral)   Resp 16   Ht 5' 6\" (1.676 m)   Wt 216 lb 0.8 oz (98 kg)   LMP 11/01/1985   SpO2 93%   BMI 34.87 kg/m²   General appearance - alert, well appearing, and in no distress and overweight  Mental status - alert, oriented to person, place, and time  Eyes - not examined  Ears - hearing grossly normal bilaterally  Nose - normal and patent, no erythema, discharge or polyps  Mouth - mucous membranes moist, pharynx normal without lesions  Neck - supple, no significant adenopathy  Lymphatics - no palpable lymphadenopathy, no hepatosplenomegaly  Chest - decreased air entry noted posteriorly  Heart - irregularly irregular rhythm with rate 79  Abdomen - soft, nontender, nondistended, no masses or organomegaly  Breasts - not examined  Back exam - not examined  Neurological - alert, oriented, normal speech, no focal findings or movement disorder noted  Musculoskeletal - osteoarthritic changes noted in both hands  Extremities - pedal edema trace +  Skin - normal coloration and turgor, no rashes, no suspicious skin lesions noted    Data:   Medications:   Current Facility-Administered Medications   Medication Dose Route Frequency Provider Last Rate Last Dose    calcium carb-cholecalciferol 600-400 MG-UNIT per tab 1 tablet  1 tablet Oral Daily Jessi Khan MD   1 tablet at 04/17/19 0830    predniSONE (DELTASONE) tablet 30 mg  30 mg Oral Daily Candace Xiao MD        nystatin (MYCOSTATIN) 517493 UNIT/ML suspension 500,000 Units  5 mL Oral 4x Daily Candace Xiao MD   500,000 Units at 04/17/19 2258    furosemide (LASIX) injection 40 mg  40 mg Intravenous BID Arturo Mckeon MD   40 mg at 04/17/19 1620    ipratropium-albuterol (DUONEB) nebulizer solution 1 ampule  1 ampule Inhalation 4x daily Melony Babcock MD   1 ampule at 04/17/19 1846    potassium chloride (KLOR-CON M) extended release tablet 20 mEq  20 mEq Oral BID  Erica Phipps MD   20 mEq at 04/17/19 1620    apixaban (ELIQUIS) tablet 2.5 mg  2.5 mg Oral BID Jessi Khan MD   2.5 mg at 04/17/19 2124    aspirin EC tablet 81 mg  81 mg Oral Daily Jessi Khan MD   81 mg at 04/17/19 0830    atorvastatin (LIPITOR) tablet 80 mg  80 mg Oral Daily Jessi Khan MD   80 mg at 04/17/19 2124    citalopram (CELEXA) tablet 20 mg  20 mg Oral Daily Jessi Khan MD   20 mg at 04/17/19 0830    LORazepam (ATIVAN) tablet 0.5 mg  0.5 mg Oral Q8H PRN Jessi Khan MD   0.5 mg at 04/17/19 2311    metoprolol succinate (TOPROL XL) extended release tablet 100 mg  100 mg Oral Daily Jessi Khan MD   100 mg at 04/17/19 0830    pantoprazole (PROTONIX) tablet 40 mg  40 mg Oral QAM AC Jessi Khan MD   40 mg at 04/17/19 0830    sodium chloride flush 0.9 % injection 10 mL  10 mL Intravenous 2 times per day Jessi Khan MD   10 mL at 04/17/19 2124    sodium chloride flush 0.9 % injection 10 mL  10 mL Intravenous PRN Jessi Khan MD   10 mL at 04/14/19 1810    acetaminophen (TYLENOL) tablet 650 mg  650 mg Oral Q4H PRN Jessi Khan MD   650 mg at 04/14/19 1145    morphine (PF) injection 2 mg  2 mg Intravenous Q2H PRN Jessi Khan MD   2 mg at 04/15/19 0030    Or    morphine sulfate (PF) injection 4 mg  4 mg Intravenous Q2H PRN Nia Zelaya MD   4 mg at 04/17/19 2311    ondansetron (ZOFRAN) injection 4 mg  4 mg Intravenous Q8H PRN Nia Zelaya MD           Intake/Output Summary (Last 24 hours) at 4/18/2019 0529  Last data filed at 4/18/2019 0400  Gross per 24 hour   Intake 1450 ml   Output 2000 ml   Net -550 ml     Recent Results (from the past 24 hour(s))   Basic Metabolic Prof    Collection Time: 04/18/19  5:03 AM   Result Value Ref Range    Glucose 114 (H) 70 - 99 mg/dL    BUN 51 (H) 8 - 23 mg/dL    CREATININE 0.70 0.50 - 0.90 mg/dL    Bun/Cre Ratio NOT REPORTED 9 - 20    Calcium 9.2 8.6 - 10.4 mg/dL    Sodium 137 135 - 144 mmol/L    Potassium 4.1 3.7 - 5.3 mmol/L    Chloride 92 (L) 98 - 107 mmol/L    CO2 38 (H) 20 - 31 mmol/L    Anion Gap 7 (L) 9 - 17 mmol/L    GFR Non-African American >60 >60 mL/min    GFR African American >60 >60 mL/min    GFR Comment          GFR Staging NOT REPORTED    CBC with DIFF    Collection Time: 04/18/19  5:03 AM   Result Value Ref Range    WBC 9.9 3.5 - 11.0 k/uL    RBC 4.45 4.0 - 5.2 m/uL    Hemoglobin 12.8 12.0 - 16.0 g/dL    Hematocrit 40.3 36 - 46 %    MCV 90.5 80 - 100 fL    MCH 28.8 26 - 34 pg    MCHC 31.8 31 - 37 g/dL    RDW 20.1 (H) 11.5 - 14.9 %    Platelets 338 145 - 937 k/uL    MPV 9.2 6.0 - 12.0 fL    NRBC Automated NOT REPORTED per 100 WBC    Differential Type NOT REPORTED     Seg Neutrophils PENDING %    Lymphocytes PENDING %    Monocytes PENDING %    Eosinophils % PENDING %    Basophils PENDING %    Immature Granulocytes NOT REPORTED 0 %    Segs Absolute PENDING k/uL    Absolute Lymph # PENDING k/uL    Absolute Mono # PENDING k/uL    Absolute Eos # PENDING k/uL    Basophils # PENDING 0.0 - 0.2 k/uL    Absolute Immature Granulocyte NOT REPORTED 0.00 - 0.30 k/uL    WBC Morphology NOT REPORTED     RBC Morphology NOT REPORTED     Platelet Estimate NOT REPORTED -----------------------------------------------------------------  RAD:  EKG:  Micro:     Assessment & Plan:    Patient Active Problem List:     GERD (gastroesophageal reflux disease)     Anxiety     Lipids abnormal     Hypertension     Heart disease     Atrial fibrillation (HCC)     Pulmonary fibrosis (HCC)     Pneumonia     Mixed hyperlipidemia     Acute cystitis without hematuria     Interstitial lung disease (HCC)     Centrilobular emphysema (HCC)     Pulmonary hypertension (HCC)     Chronic diastolic CHF (congestive heart failure) (HCC)     Pulmonary embolus (HCC)     CAD (coronary artery disease), native coronary artery     Hypertensive heart disease without heart failure     Hypotension     Hypotension, unspecified     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 Chronic CHF - at baseline. D/C planning ongoing. Continue current treatments. Complete orders per chart.     See orders   Disposition:    Electronically signed by Cierra Burns MD on 4/18/2019 at 5:29 AM

## 2019-04-19 ENCOUNTER — APPOINTMENT (OUTPATIENT)
Dept: GENERAL RADIOLOGY | Age: 79
DRG: 291 | End: 2019-04-19
Payer: MEDICARE

## 2019-04-19 VITALS
WEIGHT: 216.05 LBS | HEIGHT: 66 IN | TEMPERATURE: 98.1 F | RESPIRATION RATE: 23 BRPM | OXYGEN SATURATION: 93 % | SYSTOLIC BLOOD PRESSURE: 99 MMHG | BODY MASS INDEX: 34.72 KG/M2 | DIASTOLIC BLOOD PRESSURE: 49 MMHG | HEART RATE: 78 BPM

## 2019-04-19 LAB
ABSOLUTE EOS #: 0.1 K/UL (ref 0–0.4)
ABSOLUTE IMMATURE GRANULOCYTE: ABNORMAL K/UL (ref 0–0.3)
ABSOLUTE LYMPH #: 0.6 K/UL (ref 1–4.8)
ABSOLUTE MONO #: 0.6 K/UL (ref 0.1–1.3)
ANION GAP SERPL CALCULATED.3IONS-SCNC: 7 MMOL/L (ref 9–17)
BASOPHILS # BLD: 0 % (ref 0–2)
BASOPHILS ABSOLUTE: 0 K/UL (ref 0–0.2)
BUN BLDV-MCNC: 50 MG/DL (ref 8–23)
BUN/CREAT BLD: ABNORMAL (ref 9–20)
CALCIUM SERPL-MCNC: 9.5 MG/DL (ref 8.6–10.4)
CHLORIDE BLD-SCNC: 93 MMOL/L (ref 98–107)
CO2: 41 MMOL/L (ref 20–31)
CREAT SERPL-MCNC: 0.82 MG/DL (ref 0.5–0.9)
DIFFERENTIAL TYPE: ABNORMAL
EOSINOPHILS RELATIVE PERCENT: 1 % (ref 0–4)
GFR AFRICAN AMERICAN: >60 ML/MIN
GFR NON-AFRICAN AMERICAN: >60 ML/MIN
GFR SERPL CREATININE-BSD FRML MDRD: ABNORMAL ML/MIN/{1.73_M2}
GFR SERPL CREATININE-BSD FRML MDRD: ABNORMAL ML/MIN/{1.73_M2}
GLUCOSE BLD-MCNC: 101 MG/DL (ref 70–99)
HCT VFR BLD CALC: 40 % (ref 36–46)
HEMOGLOBIN: 12.9 G/DL (ref 12–16)
IMMATURE GRANULOCYTES: ABNORMAL %
LYMPHOCYTES # BLD: 6 % (ref 24–44)
MCH RBC QN AUTO: 29.4 PG (ref 26–34)
MCHC RBC AUTO-ENTMCNC: 32.2 G/DL (ref 31–37)
MCV RBC AUTO: 91.3 FL (ref 80–100)
MONOCYTES # BLD: 7 % (ref 1–7)
NRBC AUTOMATED: ABNORMAL PER 100 WBC
PDW BLD-RTO: 20 % (ref 11.5–14.9)
PLATELET # BLD: 177 K/UL (ref 150–450)
PLATELET ESTIMATE: ABNORMAL
PMV BLD AUTO: 9.6 FL (ref 6–12)
POTASSIUM SERPL-SCNC: 4.1 MMOL/L (ref 3.7–5.3)
RBC # BLD: 4.39 M/UL (ref 4–5.2)
RBC # BLD: ABNORMAL 10*6/UL
SEG NEUTROPHILS: 86 % (ref 36–66)
SEGMENTED NEUTROPHILS ABSOLUTE COUNT: 8 K/UL (ref 1.3–9.1)
SODIUM BLD-SCNC: 141 MMOL/L (ref 135–144)
WBC # BLD: 9.3 K/UL (ref 3.5–11)
WBC # BLD: ABNORMAL 10*3/UL

## 2019-04-19 PROCEDURE — 94660 CPAP INITIATION&MGMT: CPT

## 2019-04-19 PROCEDURE — 85025 COMPLETE CBC W/AUTO DIFF WBC: CPT

## 2019-04-19 PROCEDURE — 94640 AIRWAY INHALATION TREATMENT: CPT

## 2019-04-19 PROCEDURE — 6370000000 HC RX 637 (ALT 250 FOR IP): Performed by: STUDENT IN AN ORGANIZED HEALTH CARE EDUCATION/TRAINING PROGRAM

## 2019-04-19 PROCEDURE — 36415 COLL VENOUS BLD VENIPUNCTURE: CPT

## 2019-04-19 PROCEDURE — 2700000000 HC OXYGEN THERAPY PER DAY

## 2019-04-19 PROCEDURE — 6370000000 HC RX 637 (ALT 250 FOR IP): Performed by: INTERNAL MEDICINE

## 2019-04-19 PROCEDURE — 6360000002 HC RX W HCPCS: Performed by: INTERNAL MEDICINE

## 2019-04-19 PROCEDURE — 71045 X-RAY EXAM CHEST 1 VIEW: CPT

## 2019-04-19 PROCEDURE — 80048 BASIC METABOLIC PNL TOTAL CA: CPT

## 2019-04-19 PROCEDURE — 94762 N-INVAS EAR/PLS OXIMTRY CONT: CPT

## 2019-04-19 PROCEDURE — 99239 HOSP IP/OBS DSCHRG MGMT >30: CPT | Performed by: FAMILY MEDICINE

## 2019-04-19 PROCEDURE — 2580000003 HC RX 258: Performed by: STUDENT IN AN ORGANIZED HEALTH CARE EDUCATION/TRAINING PROGRAM

## 2019-04-19 RX ADMIN — PANTOPRAZOLE SODIUM 40 MG: 40 TABLET, DELAYED RELEASE ORAL at 09:35

## 2019-04-19 RX ADMIN — APIXABAN 2.5 MG: 2.5 TABLET, FILM COATED ORAL at 09:35

## 2019-04-19 RX ADMIN — IPRATROPIUM BROMIDE AND ALBUTEROL SULFATE 1 AMPULE: .5; 3 SOLUTION RESPIRATORY (INHALATION) at 08:09

## 2019-04-19 RX ADMIN — CITALOPRAM HYDROBROMIDE 20 MG: 20 TABLET ORAL at 09:34

## 2019-04-19 RX ADMIN — PREDNISONE 30 MG: 20 TABLET ORAL at 09:32

## 2019-04-19 RX ADMIN — METOPROLOL SUCCINATE 100 MG: 100 TABLET, EXTENDED RELEASE ORAL at 09:34

## 2019-04-19 RX ADMIN — NYSTATIN 500000 UNITS: 100000 SUSPENSION ORAL at 09:32

## 2019-04-19 RX ADMIN — ASPIRIN 81 MG: 81 TABLET, COATED ORAL at 09:34

## 2019-04-19 RX ADMIN — Medication 10 ML: at 09:38

## 2019-04-19 RX ADMIN — IPRATROPIUM BROMIDE AND ALBUTEROL SULFATE 1 AMPULE: .5; 3 SOLUTION RESPIRATORY (INHALATION) at 14:49

## 2019-04-19 RX ADMIN — IPRATROPIUM BROMIDE AND ALBUTEROL SULFATE 1 AMPULE: .5; 3 SOLUTION RESPIRATORY (INHALATION) at 11:01

## 2019-04-19 RX ADMIN — CALCIUM CARBONATE 600 MG (1,500 MG)-VITAMIN D3 400 UNIT TABLET 1 TABLET: at 09:44

## 2019-04-19 RX ADMIN — FUROSEMIDE 40 MG: 10 INJECTION, SOLUTION INTRAMUSCULAR; INTRAVENOUS at 09:32

## 2019-04-19 RX ADMIN — POTASSIUM CHLORIDE 20 MEQ: 1500 TABLET, EXTENDED RELEASE ORAL at 09:34

## 2019-04-19 ASSESSMENT — PAIN SCALES - GENERAL
PAINLEVEL_OUTOF10: 0
PAINLEVEL_OUTOF10: 0

## 2019-04-19 NOTE — PLAN OF CARE
Nutrition Problem: Altered nutrition-related lab values  Intervention: Food and/or Nutrient Delivery: Continue current diet, Discontinue ONS  Nutritional Goals: Adequate nutrition intake or provision

## 2019-04-19 NOTE — PROGRESS NOTES
Pulmonary Progress Note  [facility]      Patient - Oneyda Barrera,  Age - 78 y.o.    - 1940      Room Number -    MRN -  [id number]   Acct # - [de-identified]  Date of Admission -  2019  4:16 PM    Consulting 838 Loly Sanchez MD  Primary Care Physician - Prakash Hampton MD     SUBJECTIVE   Patient appears to be in good spirits. Claims she feels about the same as history. On FiO2 80% with high flow. No chest pain no increased wheezing no sputum production    OBJECTIVE   VITALS    height is 5' 6\" (1.676 m) and weight is 216 lb 0.8 oz (98 kg). Her axillary temperature is 97.6 °F (36.4 °C). Her blood pressure is 129/67 and her pulse is 87. Her respiration is 21 and oxygen saturation is 85% (abnormal). Body mass index is 34.87 kg/m². Temperature Range: Temp: 97.6 °F (36.4 °C) Temp  Av.7 °F (36.5 °C)  Min: 97.6 °F (36.4 °C)  Max: 97.8 °F (36.6 °C)  BP Range:  Systolic (76XZY), ACY:413 , Min:87 , BVK:580     Diastolic (07YDH), JCP:13, Min:37, Max:86    Pulse Range: Pulse  Av.6  Min: 69  Max: 87  Respiration Range: Resp  Av.8  Min: 8  Max: 29  Current Pulse Ox[de-identified]  SpO2: (!) 85 %  24HR Pulse Ox Range:  SpO2  Av.2 %  Min: 85 %  Max: 97 %  Oxygen Amount and Delivery: O2 Flow Rate (L/min): 55 L/min    Wt Readings from Last 3 Encounters:   19 194 lb (88 kg)   19 189 lb (85.7 kg)   19 194 lb 7.1 oz (88.2 kg)       I/O (24 Hours)    Intake/Output Summary (Last 24 hours) at 2019 1222  Last data filed at 2019 1047  Gross per 24 hour   Intake 720 ml   Output 1500 ml   Net -780 ml       EXAM     General Appearance  Awake, alert, oriented, in no acute distress  HEENT - normocephalic, atraumatic. Neck - Supple,  trachea midline   Lungs - coarse crackles one third from base to apex posterior;y  Heart Exam:PMI normal. No lifts, heaves, or thrills. RRR.  No murmurs, clicks, gallops, or rubs  Abdomen Exam: Abdomen soft, non-tender. BS alfredo  Extremity Exam: no cyanosis    MEDS      calcium carb-cholecalciferol  1 tablet Oral Daily    predniSONE  30 mg Oral Daily    nystatin  5 mL Oral 4x Daily    furosemide  40 mg Intravenous BID    ipratropium-albuterol  1 ampule Inhalation 4x daily    potassium chloride  20 mEq Oral BID WC    apixaban  2.5 mg Oral BID    aspirin  81 mg Oral Daily    atorvastatin  80 mg Oral Daily    citalopram  20 mg Oral Daily    metoprolol succinate  100 mg Oral Daily    pantoprazole  40 mg Oral QAM AC    sodium chloride flush  10 mL Intravenous 2 times per day       LORazepam, sodium chloride flush, acetaminophen, morphine **OR** morphine, ondansetron    LABS   CBC   Recent Labs     04/19/19  0421   WBC 9.3   HGB 12.9   HCT 40.0   MCV 91.3        BMP:   Lab Results   Component Value Date     04/19/2019    K 4.1 04/19/2019    CL 93 04/19/2019    CO2 41 04/19/2019    BUN 50 04/19/2019    LABALBU 3.2 01/30/2019    CREATININE 0.82 04/19/2019    CALCIUM 9.5 04/19/2019    GFRAA >60 04/19/2019    LABGLOM >60 04/19/2019     ABGs:  Lab Results   Component Value Date    PHART 7.404 01/24/2019    PO2ART 64.1 01/24/2019    GHF7UZQ 46.1 01/24/2019      Lab Results   Component Value Date    MODE NOT REPORTED 01/24/2019     Ionized Calcium:  No results found for: IONCA  Magnesium:    Lab Results   Component Value Date    MG 1.8 12/27/2017     Phosphorus:  No results found for: PHOS     LIVER PROFILE No results for input(s): AST, ALT, LIPASE, BILIDIR, BILITOT, ALKPHOS in the last 72 hours. Invalid input(s): AMYLASE,  ALB  INR No results for input(s): INR in the last 72 hours.   PTT No results found for: APTT      RADIOLOGY     (See actual reports for details)    ASSESSMENT/PLAN     Patient Active Problem List   Diagnosis    GERD (gastroesophageal reflux disease)    Anxiety    Lipids abnormal    Hypertension    Heart disease    Atrial fibrillation (HCC)    [facility]monary [facility] ([facility]    Pneumonia    Mixed hyperlipidemia    Acute cystitis without hematuria    Interstitial lung disease (HCC)    Centrilobular emphysema (HCC)    Pulmonary hypertension (HCC)    Chronic diastolic CHF (congestive heart failure) (HCC)    Pulmonary embolus (HCC)    CAD (coronary artery disease), native coronary artery    Hypertensive heart disease without heart failure    Hypotension    Hypotension, unspecified    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    Iatrogenic hypotension    Lung nodule, solitary    S/P CABG x 4    Acute on chronic respiratory failure with hypoxemia (HCC)    Hypoxia    Moderate malnutrition (HCC)    CHF, left ventricular (HCC)    Acute on chronic diastolic CHF (congestive heart failure) (HCC)     Chronic respiratory failure with hypoxemia  Volume overload  Pulmonary fibrosis     On Deltasone 30 mg daily we will wean to 20 mg   On DuoNeb treatments   On Eliquis   Discharge planning to LTAC underway    Chest x-ray done today reveals no acute changes from the previous film on April 11    Electronically signed by Jose Angel Gamble MD on 4/19/2019 at 12:22 PM

## 2019-04-19 NOTE — PROGRESS NOTES
Nutrition Assessment    Type and Reason for Visit: Reassess    Nutrition Recommendations: Discontinue supplement at patients request.    Nutrition Assessment: Pt somewhat improved stating her appetite is better but doesn't want the supplement. Malnutrition Assessment:  · Malnutrition Status: No malnutrition  · Context: Acute illness or injury    Nutrition Risk Level: Moderate    Nutrient Needs:  · Estimated Daily Total Kcal: 16-17 kcal   · Estimated Daily Protein (g): 76-88 gm pro based on 1.3-1.5 gm/kg IBW    Nutrition Diagnosis:   · Problem: Altered nutrition-related lab values  · Etiology: related to Cardiac dysfunction, Other (Comment)(Pulmonary fibrosis)     Signs and symptoms:  as evidenced by Lab values, Localized or generalized fluid accumulation    Objective Information:  · Nutrition-Focused Physical Findings: Edema: +2 pitting RLE, +3 pitting LLE, +2 sacral  · Wound Type: None  · Current Nutrition Therapies:  · Oral Diet Orders: Cardiac   · Oral Diet intake: 51-75%  · Anthropometric Measures:  · Ht: 5' 6\" (167.6 cm)   · Current Body Wt: 216 lb (98 kg)  · Admission Body Wt: 223 lb (101.2 kg)  · Ideal Body Wt: 130 lb (59 kg), % Ideal Body 172%  · BMI Classification: BMI 30.0 - 34.9 Obese Class I    Nutrition Interventions:   Continue current diet, Discontinue ONS  Continued Inpatient Monitoring, Education Not Indicated    Nutrition Evaluation:   · Evaluation: No progress toward goals   · Goals: Adequate nutrition intake or provision    · Monitoring: Meal Intake, Supplement Intake, Weight, Pertinent Labs, Monitor Hemodynamic Status, I&O    SHAWNEE Delgado R.D.   Clinical Dietitian  Office: [phone]

## 2019-04-19 NOTE — PROGRESS NOTES
douneb tx given spo2 on hfnc 80% o2 is 96% [id number] o2 to 75% hr 83 clear breath sounds through out posterior

## 2019-04-19 NOTE — CARE COORDINATION
Pt discharged to [facility] via [facility] with high flow 02.  Pt son Ronaldo Johnson notified

## 2019-04-19 NOTE — PROGRESS NOTES
noted    Data:   Medications:   Current Facility-Administered Medications   Medication Dose Route Frequency Provider Last Rate Last Dose    calcium carb-cholecalciferol 600-400 MG-UNIT per tab 1 tablet  1 tablet Oral Daily Caty Davis MD   1 tablet at 04/18/19 0804    predniSONE (DELTASONE) tablet 30 mg  30 mg Oral Daily Michael Dalton MD   30 mg at 04/18/19 1156    nystatin (MYCOSTATIN) 594829 UNIT/ML suspension 500,000 Units  5 mL Oral 4x Daily Michael Dalton MD   500,000 Units at 04/18/19 2056    furosemide (LASIX) injection 40 mg  40 mg Intravenous BID Quentin Harris MD   40 mg at 04/18/19 2046    ipratropium-albuterol (DUONEB) nebulizer solution 1 ampule  1 ampule Inhalation 4x daily Estrada Salcido MD   1 ampule at 04/18/19 2032    potassium chloride (KLOR-CON M) extended release tablet 20 mEq  20 mEq Oral BID WC Serge Signs MD Moise   20 mEq at 04/18/19 1658    apixaban (ELIQUIS) tablet 2.5 mg  2.5 mg Oral BID Caty Davis MD   2.5 mg at 04/18/19 2046    aspirin EC tablet 81 mg  81 mg Oral Daily Caty Davis MD   81 mg at 04/18/19 1151    atorvastatin (LIPITOR) tablet 80 mg  80 mg Oral Daily Caty Davis MD   80 mg at 04/18/19 2046    citalopram (CELEXA) tablet 20 mg  20 mg Oral Daily Caty Davis MD   20 mg at 04/18/19 1152    LORazepam (ATIVAN) tablet 0.5 mg  0.5 mg Oral Q8H PRN Caty Davis MD   0.5 mg at 04/18/19 2302    metoprolol succinate (TOPROL XL) extended release tablet 100 mg  100 mg Oral Daily Caty Davis MD   100 mg at 04/18/19 0804    pantoprazole (PROTONIX) tablet 40 mg  40 mg Oral QAM AC Caty Davis MD   40 mg at 04/18/19 0804    sodium chloride flush 0.9 % injection 10 mL  10 mL Intravenous 2 times per day Caty Davis MD   10 mL at 04/18/19 2046    sodium chloride flush 0.9 % injection 10 mL  10 mL Intravenous PRN Caty Davis MD   10 mL at 04/14/19 1810    acetaminophen (TYLENOL) tablet 650 mg  650 mg Oral Q4H PRN Lisbeth Mole Lorna Cueva MD   650 mg at 04/14/19 1145    morphine (PF) injection 2 mg  2 mg Intravenous Q2H PRN Wilver Mcgraw MD   2 mg at 04/18/19 2302    Or    morphine sulfate (PF) injection 4 mg  4 mg Intravenous Q2H PRN Wilver Mcgraw MD   4 mg at 04/17/19 2311    ondansetron (ZOFRAN) injection 4 mg  4 mg Intravenous Q8H PRN Wilver Mcgraw MD           Intake/Output Summary (Last 24 hours) at 4/19/2019 0757  Last data filed at 4/19/2019 0500  Gross per 24 hour   Intake 720 ml   Output 900 ml   Net -180 ml     Recent Results (from the past 24 hour(s))   Basic Metabolic Prof    Collection Time: 04/19/19  4:21 AM   Result Value Ref Range    Glucose 101 (H) 70 - 99 mg/dL    BUN 50 (H) 8 - 23 mg/dL    CREATININE 0.82 0.50 - 0.90 mg/dL    Bun/Cre Ratio NOT REPORTED 9 - 20    Calcium 9.5 8.6 - 10.4 mg/dL    Sodium 141 135 - 144 mmol/L    Potassium 4.1 3.7 - 5.3 mmol/L    Chloride 93 (L) 98 - 107 mmol/L    CO2 41 (HH) 20 - 31 mmol/L    Anion Gap 7 (L) 9 - 17 mmol/L    GFR Non-African American >60 >60 mL/min    GFR African American >60 >60 mL/min    GFR Comment          GFR Staging NOT REPORTED    CBC with DIFF    Collection Time: 04/19/19  4:21 AM   Result Value Ref Range    WBC 9.3 3.5 - 11.0 k/uL    RBC 4.39 4.0 - 5.2 m/uL    Hemoglobin 12.9 12.0 - 16.0 g/dL    Hematocrit 40.0 36 - 46 %    MCV 91.3 80 - 100 fL    MCH 29.4 26 - 34 pg    MCHC 32.2 31 - 37 g/dL    RDW 20.0 (H) 11.5 - 14.9 %    Platelets 952 214 - 747 k/uL    MPV 9.6 6.0 - 12.0 fL    NRBC Automated NOT REPORTED per 100 WBC    Differential Type NOT REPORTED     Seg Neutrophils 86 (H) 36 - 66 %    Lymphocytes 6 (L) 24 - 44 %    Monocytes 7 1 - 7 %    Eosinophils % 1 0 - 4 %    Basophils 0 0 - 2 %    Immature Granulocytes NOT REPORTED 0 %    Segs Absolute 8.00 1.3 - 9.1 k/uL    Absolute Lymph # 0.60 (L) 1.0 - 4.8 k/uL    Absolute Mono # 0.60 0.1 - 1.3 k/uL    Absolute Eos # 0.10 0.0 - 0.4 k/uL    Basophils # 0.00 0.0 - 0.2 k/uL    Absolute Immature Granulocyte NOT REPORTED 0.00 - 0.30 k/uL    WBC Morphology NOT REPORTED     RBC Morphology NOT REPORTED     Platelet Estimate NOT REPORTED      -----------------------------------------------------------------  RAD:  EKG:  Micro:     Assessment & Plan:    Patient Active Problem List:     GERD (gastroesophageal reflux disease)     Anxiety     Lipids abnormal     Hypertension     Heart disease     Atrial fibrillation (HCC)     Pulmonary fibrosis (HCC)     Pneumonia     Mixed hyperlipidemia     Acute cystitis without hematuria     Interstitial lung disease (HCC)     Centrilobular emphysema (HCC)     Pulmonary hypertension (HCC)     Chronic diastolic CHF (congestive heart failure) (HCC)     Pulmonary embolus (HCC)     CAD (coronary artery disease), native coronary artery     Hypertensive heart disease without heart failure     Hypotension     Hypotension, unspecified     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 Chronic CHF - at baseline. Okay to D/C to [facility] when bed available. Complete orders per chart.     See orders   Disposition:    Electronically signed by James Alcala MD on 4/19/2019 at 7:57 AM

## 2019-04-19 NOTE — CARE COORDINATION
spoke to the pt son,jose eduardo, about discharge plan. He stated he and his brother did NOT want the pt to go [facility]. They didn't like it and it was to far away. They would like the pt referred to advanced specialty. Writer did remind him the pt will be discharge today. he didn't respond to that.  and tami emerson then spoke to the pt to see what she wanted to do. She said [facility] is fine with her. Marvr called pedro at [facility] and made a referral. Marvr informed pedro that the pt is ready for discharge today.

## 2019-04-23 ENCOUNTER — CARE COORDINATION (OUTPATIENT)
Dept: CARE COORDINATION | Age: 79
End: 2019-04-23

## 2019-04-23 NOTE — CARE COORDINATION
Placed PC to [facility]. 16. and spoke with Breezy Brewer, [facility] confirmed that Jill Miller is a patient there and asked [facility] to call tomorrow after 3pm and ask for KIP Gonzalez at [phone], who is Roslynn Hammans will be able to give more information after their meeting tomorrow afternoon.      CC PLAN: RNCC will attempt to reach [facility] tomorrow after 3 PM.

## 2019-04-24 ENCOUNTER — CARE COORDINATION (OUTPATIENT)
Dept: CARE COORDINATION | Age: 79
End: 2019-04-24

## 2019-04-24 NOTE — CARE COORDINATION
MARGARET Gonzalez at [facility], returned [facility] [place] and did not have any information on Patricia Cat. She suggested a PC to Erum's son. Placed PC to [id number] son Uri Valderrama, who said Patricia Cat has her good days and bad. She continues on hi-flow O2 but is getting up to the [facility] and walking a bit around her room. He said they will try to wean the O2 as tolerated. Uri Valderrama said she is comfortable and not working at breathing too much. CC PLAN: F/U PC in 1-2 weeks to check on Erum's progress.

## 2019-04-26 NOTE — DISCHARGE SUMMARY
[facility] Discharge Summary      Patient ID: Oneyda Barrera    MRN: [id number]     Acct:  [de-identified]       Patient's PCP: Prakash Hampton MD    Admit Date: 4/4/2019     Discharge Date: 4/19/2019      Admitting Physician: Prakash Hampton MD    Discharge Physician: Prakash Hampton MD     Discharge Diagnoses:    Primary Problem  Acute on chronic diastolic CHF (congestive heart failure) [facility])    Active Hospital Problems    Diagnosis Date Noted    CHF, left ventricular ([address] [I50.9] 04/04/2019    Acute on chronic diastolic CHF (congestive heart failure) ([address] [I50.33] 04/04/2019    Moderate malnutrition ([address] [E44.0] 01/24/2019    Centrilobular emphysema ([address] [J43.2] 07/03/2018    Pulmonary hypertension ([address] [I27.20] 07/03/2018    Interstitial lung disease ([address] [J84.9] 07/03/2018    Acute cystitis without hematuria [N30.00] 12/21/2017    Mixed hyperlipidemia [E78.2] 12/20/2017    Pulmonary fibrosis ([address] [J84.10] 08/07/2017    Atrial fibrillation ([address] [I48.91]     Anxiety [F41.9]     GERD (gastroesophageal reflux disease) [K21.9]     Hypertension [I10]     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 SBP recovered and in 130's.  /    Iatrogenic hypotension 3/26/2014    Interstitial lung disease ([address]     Lipids abnormal     Mixed hyperlipidemia 12/20/2017    LIANET on CPAP     Pneumonia 12/20/2017    Pulmonary embolus ([facility] 7/3/2018    Pulmonary fibrosis ([facility]     Pulmonary hypertension ([facility] 7/3/2018    S/P CABG x 4 1/12/2010    Overview:  Pt is on asa/statin/bb/plavix. Postpump echo ok. PMWdc'd. /  Overview:  Overview:  Pt is on asa/statin/bb/plavix. Postpump echo ok. PMWdc'd. /    UIP (usual interstitial pneumonitis) ([facility]      The patient was seen and examined on day of discharge and this discharge summary is in conjunction with any daily progress note from day of discharge. Code Status:  Prior    Hospital Course: uncomplicated    Consults:  cardiology and pulmonary/intensive care    Significant Diagnostic Studies: as above, and as follows: see chart    Treatments: as above    Disposition: LTAC    Discharged Condition: Stable    Follow Up: Adelaida Rock MD in one week    Discharge Medications:    Boo Osman   Home Medication Instructions [id number]    Printed on:04/25/19 2030   Medication Information                      apixaban (ELIQUIS) 5 MG TABS tablet  Take 2.5 mg by mouth 2 times daily             aspirin 81 MG tablet  Take 81 mg by mouth daily             atorvastatin (LIPITOR) 80 MG tablet  Take 80 mg by mouth daily              Calcium Carb-Cholecalciferol 600-400 MG-UNIT CAPS  Take 600 mg by mouth daily             citalopram (CELEXA) 20 MG tablet  TAKE 1 TABLET EVERY DAY             furosemide (LASIX) 40 MG tablet  Take 40 mg by mouth 2 times daily             LORazepam (ATIVAN) 0.5 MG tablet  Take 0.5 mg by mouth every 8 hours as needed for Anxiety. metoprolol succinate (TOPROL XL) 100 MG extended release tablet  Take 100 mg by mouth daily             Misc. Devices (ROLLATOR) MISC  1 each by Does not apply route as needed (use for ambulation)             Misc. Devices MISC  1 each by Does not apply route continuous The patient needs extensions for the tubing also. She wears O2 as she ambulates around the house. 10 of these please.              Multiple Vitamins-Minerals (MULTIVITAMIN WITH MINERALS) tablet  Take 1 tablet by mouth daily             nitroGLYCERIN (NITROSTAT) 0.4 MG SL tablet  Place 1 tablet under the tongue as needed for Chest pain             omeprazole (PRILOSEC) 40 MG delayed release capsule  TAKE 1 CAPSULE EVERY DAY             OXYGEN  Inhale 5 L into the lungs continuous                   Activity: activity as tolerated    Diet: cardiac diet    Time Spent on discharge is more than 45 minutes in the examination, evaluation, counseling and review of medications and discharge plan.       Electronically signed by Ally Westbrook MD on 4/25/2019 at 8:30 PM

## 2019-05-14 LAB
EKG ATRIAL RATE: 72 BPM
EKG Q-T INTERVAL: 456 MS
EKG QRS DURATION: 110 MS
EKG QTC CALCULATION (BAZETT): 495 MS
EKG R AXIS: 118 DEGREES
EKG T AXIS: -39 DEGREES
EKG VENTRICULAR RATE: 71 BPM

## 2020-01-06 NOTE — PROGRESS NOTES
Patient stating that she cannot tolerate the HFNC flow at this time. Patient encouraged to keep the HFNC on for lunch and while she is up in the chair. The flow was titrated down to 45L. FiO2 still 75%. SpO2 88-90%. Patient tolerating fair at this time. BiPAP remains on standby. Anesthesia Type: 1% lidocaine with epinephrine

## 2025-01-07 NOTE — PROGRESS NOTES
Pt's son, Marco A Lane who is the POA, was notified that Pt was denied to go to [facility] d/t her high SpO2 of 75%. Writer and Clin lead Hilary spoke with him over the phone to explain the situation. Pt was very upset at the time writer told him the news. Will continue to keep Marco A Lane and his brother Mati Hebert updated. Attending to bill Attending to bill